# Patient Record
Sex: FEMALE | Race: BLACK OR AFRICAN AMERICAN | Employment: UNEMPLOYED | URBAN - METROPOLITAN AREA
[De-identification: names, ages, dates, MRNs, and addresses within clinical notes are randomized per-mention and may not be internally consistent; named-entity substitution may affect disease eponyms.]

---

## 2017-11-24 ENCOUNTER — OFFICE VISIT (OUTPATIENT)
Dept: FAMILY MEDICINE CLINIC | Age: 52
End: 2017-11-24

## 2017-11-24 VITALS
RESPIRATION RATE: 18 BRPM | SYSTOLIC BLOOD PRESSURE: 135 MMHG | BODY MASS INDEX: 37.56 KG/M2 | OXYGEN SATURATION: 98 % | TEMPERATURE: 97.2 F | HEIGHT: 63 IN | WEIGHT: 212 LBS | HEART RATE: 74 BPM | DIASTOLIC BLOOD PRESSURE: 82 MMHG

## 2017-11-24 DIAGNOSIS — M25.562 LEFT KNEE PAIN, UNSPECIFIED CHRONICITY: Primary | ICD-10-CM

## 2017-11-24 DIAGNOSIS — G47.00 INSOMNIA, UNSPECIFIED TYPE: ICD-10-CM

## 2017-11-24 DIAGNOSIS — F39 MOOD DISORDER (HCC): ICD-10-CM

## 2017-11-24 DIAGNOSIS — Z12.11 SCREEN FOR COLON CANCER: ICD-10-CM

## 2017-11-24 NOTE — PROGRESS NOTES
Chief Complaint   Patient presents with    Knee Swelling     x 1 week left knee previous surgery 5 years ago    Johnna Carey Establish Care     1. Have you been to the ER, urgent care clinic since your last visit? Hospitalized since your last visit? No    2. Have you seen or consulted any other health care providers outside of the [address] [place] [address] since your last visit? Include any pap smears or colon screening. No     HPI  Renetta Tate comes in to establish care. Knee swelling: Patient has swelling of her left knee. This has been ongoing for some days now. There is not much pain however. She denies trauma to the knee. She has discomfort going up and down inclines. Patient does have a history of meniscal tear in the knee and did have arthroscopy done in the past.  She does have ibuprofen but has not been using much of this. She wonders about arthritis. Does note some stiffness in the knee. She comes in for evaluation. Insomnia: Patient has a history of insomnia. She has tried various medications without much relief. She does have trouble getting to sleep and when she sleeps then this is not for a long time. She wonders about behavioral health issues affecting her sleep. Mood disorder: Patient states that she has PTSD. Her son was killed 7 years ago. When she goes to sleep she keeps thinking about it. This is causing some of her insomnia. She was on medication for PTSD and the mood disorder but this did not help much. She also did go for counseling and CBT but also felt this was not helping much. At the moment she does not want to take any medication. She also declines referral for behavioral health at the moment.     Past Medical History  Past Medical History:   Diagnosis Date    Depression        Surgical History  Past Surgical History:   Procedure Laterality Date    KNEE SCOPE, ALLOGRAFT IMPANT          Medications      Allergies  No Known Allergies    Family History  Family History Problem Relation Age of Onset    Cancer Mother     Cancer Father     No Known Problems Brother        Social History  Social History     Social History    Marital status:      Spouse name: N/A    Number of children: N/A    Years of education: N/A     Occupational History    Not on file.      Social History Main Topics    Smoking status: Never Smoker    Smokeless tobacco: Not on file    Alcohol use No    Drug use: No    Sexual activity: Not on file     Other Topics Concern    Not on file     Social History Narrative    No narrative on file       Review of Systems  Review of Systems - History obtained from the patient  General ROS: positive for  - sleep disturbance  Psychological ROS: positive for - behavioral disorder, depression and mood swings  Ophthalmic ROS: negative  ENT ROS: negative  Allergy and Immunology ROS: negative  Hematological and Lymphatic ROS: negative  Endocrine ROS: negative  Respiratory ROS: no cough, shortness of breath, or wheezing  Cardiovascular ROS: no chest pain or dyspnea on exertion  Gastrointestinal ROS: no abdominal pain, change in bowel habits, or black or bloody stools  Genito-Urinary ROS: negative  Musculoskeletal ROS: positive for - joint pain, joint stiffness, joint swelling and swelling in knee - left  Neurological ROS: negative    Vital Signs  Visit Vitals    /82 (BP 1 Location: Left arm, BP Patient Position: Sitting)    Pulse 74    Temp 97.2 °F (36.2 °C) (Oral)    Resp 18    Ht 5' 3\" (1.6 m)    Wt 212 lb (96.2 kg)    LMP 03/18/2017    SpO2 98%    BMI 37.55 kg/m2         Physical Exam  Physical Examination: General appearance - alert, well appearing, and in no distress, oriented to person, place, and time and acyanotic, in no respiratory distress  Mental status - alert, oriented to person, place, and time, affect appropriate to mood  Eyes - sclera anicteric  Nose - normal and patent, no erythema, discharge or polyps  Mouth - mucous membranes moist, pharynx normal without lesions  Neck - supple, no significant adenopathy  Lymphatics - no palpable lymphadenopathy  Chest - clear to auscultation, no wheezes, rales or rhonchi, symmetric air entry  Heart - normal rate and regular rhythm  Neurological - alert, oriented, normal speech, no focal findings or movement disorder noted  Musculoskeletal - Left knee with mild discomfort to palpation around the pre-patella margins and medially. There is slight swelling as compared to the left especially anteriorly. Slight limitation to flexion and extension of the knee due to discomfort. Extremities - no pedal edema noted, intact peripheral pulses    Results  Results for orders placed or performed in visit on 09/21/10   CHLAMYDIA/GC AMPLIFIED   Result Value Ref Range    Sample type THINPREP     Source CERVIX     Chlamydia amplified NEGATIVE  NEGATIVE    N. gonorrhea, amplified NEGATIVE  NEGATIVE    Comment (NOTE)    HEPATITIS PANEL, ACUTE   Result Value Ref Range    Hepatitis B core, IgM NEGATIVE  NEGATIVE    __       ---------------------------------------------------    Hepatitis B surface Ag .14 <1.00 Index    Hep B surface Ag Interp. NEGATIVE  NEGATIVE    __       ---------------------------------------------------    Hepatitis A, IgM NEGATIVE  NEGATIVE    __       ---------------------------------------------------    Hepatitis C virus Ab <0.02 <0.80 Index    Hep C  virus Ab Interp. NEGATIVE  NEGATIVE    Hep C  virus Ab comment        HIV-1 AB   Result Value Ref Range    HIV 1 Ab Negative Negative    HIV-1 Ab, W Blot Not Indicated Negative   RPR   Result Value Ref Range    RPR NONREACTIVE NONREACTIVE       ASSESSMENT and PLAN  1. Left knee pain, unspecified chronicity  - XR KNEE LT MIN 4 V; Future  - [address] [facility]    2. Insomnia, unspecified type    3. Screen for colon cancer  - OCCULT BLOOD, IMMUNOASSAY (FIT); Future    4.  Mood disorder ([address]    reviewed diet, exercise and weight control  reviewed medications and side effects in detail  radiology results and schedule of future radiology studies reviewed with patient    I have discussed the diagnosis with the patient and the intended plan of care as seen in the above orders. The patient has received an after-visit summary and questions were answered concerning future plans. I have discussed medication, side effects, and warnings with the patient in detail. The patient verbalized understanding and is in agreement with the plan of care. The patient will contact the office with any additional concerns.     Angela Concepcion MD

## 2017-11-24 NOTE — MR AVS SNAPSHOT
Visit Information Date & Time Provider Department Dept. Phone Encounter #  
 11/24/2017  1:00 PM Gaviota Montanez MD [facility] [phone] [id number] Follow-up Instructions Return in about 3 months (around 2/24/2018), or if symptoms worsen or fail to improve, for knee swelling, insomnia. Upcoming Health Maintenance Date Due DTaP/Tdap/Td series (1 - Tdap) 7/4/1988 PAP AKA CERVICAL CYTOLOGY 7/4/1988 BREAST CANCER SCRN MAMMOGRAM 7/4/2017 FOBT Q 1 YEAR AGE 50-75 7/4/2017 Allergies as of 11/24/2017  Review Complete On: 11/24/2017 By: Joceline Allen MD  
 No Known Allergies Current Immunizations  Never Reviewed No immunizations on file. Not reviewed this visit You Were Diagnosed With   
  
 Codes Comments Left knee pain, unspecified chronicity    -  Primary ICD-10-CM: S39.817 ICD-9-CM: 719.46 Insomnia, unspecified type     ICD-10-CM: G47.00 ICD-9-CM: 780.52 Screen for colon cancer     ICD-10-CM: Z12.11 ICD-9-CM: V76.51 Vitals BP Pulse Temp Resp Height(growth percentile) Weight(growth percentile) 135/82 (BP 1 Location: Left arm, BP Patient Position: Sitting) 74 97.2 °F (36.2 °C) (Oral) 18 5' 3\" (1.6 m) 212 lb (96.2 kg) LMP SpO2 BMI Smoking Status 03/18/2017 98% 37.55 kg/m2 Never Smoker BMI and BSA Data Body Mass Index Body Surface Area  
 37.55 kg/m 2 2.07 m 2 Your Updated Medication List  
  
Notice  As of 11/24/2017  1:45 PM  
 You have not been prescribed any medications. We Performed the Following REFERRAL TO ORTHOPEDICS [[facility] Custom] Follow-up Instructions Return in about 3 months (around 2/24/2018), or if symptoms worsen or fail to improve, for knee swelling, insomnia. To-Do List   
 11/24/2017 Imaging:  XR KNEE LT MIN 4 V   
  
 12/24/2017 Lab:  OCCULT BLOOD, IMMUNOASSAY (FIT) Referral Information Referral ID Referred By Referred To  
  
 [id number] [facility] Jose Bo MD   
   [address] [facility] [address], [address] Phone: [phone] Fax: [phone] Visits Status Start Date End Date 1 New Request 11/24/17 11/24/18 If your referral has a status of pending review or denied, additional information will be sent to support the outcome of this decision. Introducing [facility]! Lelo Tavon introduces [employer] patient portal. Now you can access parts of your medical record, email your doctor's office, and request medication refills online. 1. In your internet browser, go to [email or web address] 2. Click on the First Time User? Click Here link in the Sign In box. You will see the New Member Sign Up page. 3. Enter your [employer] Access Code exactly as it appears below. You will not need to use this code after youve completed the sign-up process. If you do not sign up before the expiration date, you must request a new code. · [employer] Access Code: [email or web address] Expires: 2/22/2018 12:44 PM 
 
4. Enter the last four digits of your Social Security Number (xxxx) and Date of Birth (mm/dd/yyyy) as indicated and click Submit. You will be taken to the next sign-up page. 5. Create a [employer] ID. This will be your [employer] login ID and cannot be changed, so think of one that is secure and easy to remember. 6. Create a [employer] password. You can change your password at any time. 7. Enter your Password Reset Question and Answer. This can be used at a later time if you forget your password. 8. Enter your e-mail address. You will receive e-mail notification when new information is available in [address] 9. Click Sign Up. You can now view and download portions of your medical record. 10. Click the Download Summary menu link to download a portable copy of your medical information. If you have questions, please visit the Frequently Asked Questions section of the [employer] website. Remember, [employer] is NOT to be used for urgent needs. For medical emergencies, dial 911. Now available from your iPhone and Android! Please provide this summary of care documentation to your next provider. Your primary care clinician is listed as Gaviota Mendoza. If you have any questions after today's visit, please call [phone].

## 2018-01-09 ENCOUNTER — OFFICE VISIT (OUTPATIENT)
Dept: ORTHOPEDIC SURGERY | Age: 53
End: 2018-01-09

## 2018-01-09 VITALS
HEIGHT: 63 IN | BODY MASS INDEX: 37.21 KG/M2 | DIASTOLIC BLOOD PRESSURE: 81 MMHG | WEIGHT: 210 LBS | HEART RATE: 74 BPM | OXYGEN SATURATION: 98 % | SYSTOLIC BLOOD PRESSURE: 145 MMHG | RESPIRATION RATE: 14 BRPM

## 2018-01-09 DIAGNOSIS — M17.12 PRIMARY OSTEOARTHRITIS OF LEFT KNEE: Primary | ICD-10-CM

## 2018-01-09 DIAGNOSIS — M25.562 LEFT KNEE PAIN, UNSPECIFIED CHRONICITY: ICD-10-CM

## 2018-01-09 RX ORDER — MELOXICAM 15 MG/1
15 TABLET ORAL
Qty: 30 TAB | Refills: 1 | Status: SHIPPED | OUTPATIENT
Start: 2018-01-09 | End: 2018-07-02

## 2018-01-09 RX ORDER — TRIAMCINOLONE ACETONIDE 40 MG/ML
40 INJECTION, SUSPENSION INTRA-ARTICULAR; INTRAMUSCULAR ONCE
Qty: 1 ML | Refills: 0
Start: 2018-01-09 | End: 2018-01-09

## 2018-01-09 RX ORDER — MELOXICAM 15 MG/1
15 TABLET ORAL DAILY
COMMUNITY
End: 2018-01-18 | Stop reason: SDUPTHER

## 2018-01-09 NOTE — PROGRESS NOTES
Víctor Tanner  1965   Chief Complaint   Patient presents with    Knee Pain     LT        HISTORY OF PRESENT ILLNESS  Víctor Tanner is a 50 y.o. female who presents today for evaluation of left knee pain. she rates her pain 0/10 today. Pain has been present for quite awhile, gradually worsening. Patient describes the pain as aching and throbbing that is Intermittent in nature. Symptoms are worse with walking up and down stairs, prolonged walking and standing, Activity and is better with  Rest. Associated symptoms include stiffness, Swelling. Since problem started, it: has worsened. Pain does not wake patient up at night. Has taken no recent medications for the problem. H/o of right knee surgery 7 years ago. Has tried following treatments: Injections:NO; Brace:NO; Therapy:NO; Cane/Crutch:NO       No Known Allergies     Past Medical History:   Diagnosis Date    Depression       Social History     Social History    Marital status:      Spouse name: N/A    Number of children: N/A    Years of education: N/A     Occupational History    Not on file. Social History Main Topics    Smoking status: Never Smoker    Smokeless tobacco: Not on file    Alcohol use No    Drug use: No    Sexual activity: Not on file     Other Topics Concern    Not on file     Social History Narrative      Past Surgical History:   Procedure Laterality Date    HX KNEE ARTHROSCOPY      KNEE SCOPE, ALLOGRAFT IMPANT        Family History   Problem Relation Age of Onset    Cancer Mother     Cancer Father     No Known Problems Brother       Current Outpatient Prescriptions   Medication Sig    meloxicam (MOBIC) 15 mg tablet Take 15 mg by mouth daily. No current facility-administered medications for this visit. REVIEW OF SYSTEM   Patient denies: Weight loss, Fever/Chills, HA, Visual changes, Fatigue, Chest pain, SOB, Abdominal pain, N/V/D/C, Blood in stool or urine, Edema.    Pertinent positive as above in HPI. All others were negative    PHYSICAL EXAM:   Visit Vitals    /81 (BP 1 Location: Left arm, BP Patient Position: Sitting)    Pulse 74    Resp 14    Ht 5' 3\" (1.6 m)    Wt 210 lb (95.3 kg)    SpO2 98%    BMI 37.2 kg/m2     The patient is a well-developed, well-nourished female   in no acute distress. The patient is alert and oriented times three. The patient is alert and oriented times three. Mood and affect are normal.  LYMPHATIC: lymph nodes are not enlarged and are within normal limits  SKIN: normal in color and non tender to palpation. There are no bruises or abrasions noted. NEUROLOGICAL: Motor sensory exam is within normal limits. Reflexes are equal bilaterally. There is normal sensation to pinprick and light touch  MUSCULOSKELETAL:  Examination Left knee   Skin Intact   Range of motion 0-130   Effusion +   Medial joint line tenderness +   Lateral joint line tenderness -   Tenderness Pes Bursa -   Tenderness insertion MCL -   Tenderness insertion LCL -   Nishis -   Patella crepitus -   Patella grind -   Lachman -   Pivot shift -   Anterior drawer -   Posterior drawer -   Varus stress -   Valgus stress -   Neurovascular Intact   Calf Swelling and Tenderness to Palpation -   Elizabeth's Test -   Hamstring Cord Tightness -       PROCEDURE: After sterile prep, 4 cc of Xylocaine and 1 cc of Kenalog were injected into the left knee.        [facility]  OFFICE PROCEDURE PROGRESS NOTE        Chart reviewed for the following:  Ursula Rubio MD, have reviewed the History, Physical and updated the Allergic reactions for [address] [place] [address] Drive performed immediately prior to start of procedure:  Ursula Rubio MD, have performed the following reviews on [facility] prior to the start of the procedure:            * Patient was identified by name and date of birth   * Agreement on procedure being performed was verified  * Risks and Benefits explained to the patient  * Procedure site verified and marked as necessary  * Patient was positioned for comfort  * Consent was signed and verified     Time: 11:56 AM    Date of procedure: 1/9/2018    Procedure performed by:  Monique Paulino MD    Provider assisted by: (see medication administration)    How tolerated by patient: tolerated the procedure well with no complications    Comments: none      IMAGING: XR of the left knee dated 1/9/18 was reviewed and read: decreased medial joint line space      IMPRESSION:      ICD-10-CM ICD-9-CM    1. Primary osteoarthritis of left knee M17.12 715.16 TRIAMCINOLONE ACETONIDE INJ      triamcinolone acetonide (KENALOG) 40 mg/mL injection      DRAIN/INJECT LARGE JOINT/BURSA      meloxicam (MOBIC) 15 mg tablet   2. Left knee pain, unspecified chronicity M25.562 719.46 AMB POC XRAY, KNEE; 1/2 VIEWS        PLAN:  1. Patient has had gradually worsening left knee pain coming from XR documented degenerative changes. Risk factors include: n/a  2. Yes cortisone injection indicated today L KNEE  3. No Physical/Occupational Therapy indicated today  4. No diagnostic test indicated today  5. Yes durable medical equipment indicated today  6. No referral indicated today   7. Yes medications indicated today MOBIC  8. No Narcotic indicated today     RTC 4 weeks if pain continues  Follow-up Disposition: Not on File    Scribed by Devendra [facility]) as dictated by Monique Paulino MD    I, Dr. Monique Paulino, confirm that all documentation is accurate.     Monique Paulino M.D.   [facility]

## 2018-01-12 ENCOUNTER — OFFICE VISIT (OUTPATIENT)
Dept: ORTHOPEDIC SURGERY | Age: 53
End: 2018-01-12

## 2018-01-12 VITALS
SYSTOLIC BLOOD PRESSURE: 155 MMHG | DIASTOLIC BLOOD PRESSURE: 149 MMHG | OXYGEN SATURATION: 100 % | HEART RATE: 45 BPM | BODY MASS INDEX: 37.39 KG/M2 | HEIGHT: 63 IN | WEIGHT: 211 LBS

## 2018-01-12 DIAGNOSIS — M70.61 TROCHANTERIC BURSITIS OF RIGHT HIP: Primary | ICD-10-CM

## 2018-01-12 RX ORDER — TRIAMCINOLONE ACETONIDE 40 MG/ML
40 INJECTION, SUSPENSION INTRA-ARTICULAR; INTRAMUSCULAR ONCE
Qty: 1 ML | Refills: 0
Start: 2018-01-12 | End: 2018-01-12

## 2018-01-12 NOTE — MR AVS SNAPSHOT
Visit Information Date & Time Provider Department Dept. Phone Encounter #  
 1/12/2018 10:00 AM Joseph Vila  [facility] and [facility] [phone] [id number] Your Appointments 2/6/2018  1:00 PM  
Follow Up with Yovani Suazo MD  
[address] [facility] and [facility] - [facility] ([facility] Appt Note: lt knee 4wk fu  
 [address] [facility], [address] [facility]  
[phone] [address] [facility], [facility] [address]  
  
    
 2/26/2018  2:00 PM  
[facility] Appt Note: 2 month return [address] [address] [address]  
  
   
 [address] [facility] Upcoming Health Maintenance Date Due DTaP/Tdap/Td series (1 - Tdap) 7/4/1988 BREAST CANCER SCRN MAMMOGRAM 7/4/2017 FOBT Q 1 YEAR AGE 50-75 7/4/2017 PAP AKA CERVICAL CYTOLOGY 7/12/2020 Allergies as of 1/12/2018  Review Complete On: 1/12/2018 By: Jelena Huerta. Mary Grace Wright LPN No Known Allergies Current Immunizations  Never Reviewed No immunizations on file. Not reviewed this visit Vitals BP Pulse Height(growth percentile) Weight(growth percentile) SpO2 BMI  
 (!) 155/149 (!) 45 5' 3\" (1.6 m) 211 lb (95.7 kg) 100% 37.38 kg/m2 OB Status Smoking Status Menopause Never Smoker Vitals History BMI and BSA Data Body Mass Index Body Surface Area  
 37.38 kg/m 2 2.06 m 2 Preferred Pharmacy Pharmacy Name Phone [address] [facility], [address] [facility] [phone] Your Updated Medication List  
  
   
This list is accurate as of: 1/12/18 11:12 AM.  Always use your most recent med list.  
  
  
  
  
 * MOBIC 15 mg tablet Generic drug:  meloxicam  
Take 15 mg by mouth daily. * meloxicam 15 mg tablet Commonly known as:  MOBIC Take 1 Tab by mouth daily (with breakfast). * Notice: This list has 2 medication(s) that are the same as other medications prescribed for you. Read the directions carefully, and ask your doctor or other care provider to review them with you. Introducing [facility]! [employer] introduces [employer] patient portal. Now you can access parts of your medical record, email your doctor's office, and request medication refills online. 1. In your internet browser, go to https://"[employer]. "[employer]"/"[employer] 2. Click on the First Time User? Click Here link in the Sign In box. You will see the New Member Sign Up page. 3. Enter your [employer] Access Code exactly as it appears below. You will not need to use this code after youve completed the sign-up process. If you do not sign up before the expiration date, you must request a new code. · [employer] Access Code: [email or web address] Expires: 2/22/2018 12:44 PM 
 
4. Enter the last four digits of your Social Security Number (xxxx) and Date of Birth (mm/dd/yyyy) as indicated and click Submit. You will be taken to the next sign-up page. 5. Create a [employer] ID. This will be your [employer] login ID and cannot be changed, so think of one that is secure and easy to remember. 6. Create a [employer] password. You can change your password at any time. 7. Enter your Password Reset Question and Answer. This can be used at a later time if you forget your password. 8. Enter your e-mail address. You will receive e-mail notification when new information is available in [address] 9. Click Sign Up. You can now view and download portions of your medical record. 10. Click the Download Summary menu link to download a portable copy of your medical information. If you have questions, please visit the Frequently Asked Questions section of the [employer] website.  Remember, [employer] is NOT to be used for urgent needs. For medical emergencies, dial 911. Now available from your iPhone and Android! Please provide this summary of care documentation to your next provider. Your primary care clinician is listed as Gaviota Mendoza. If you have any questions after today's visit, please call [phone].

## 2018-01-12 NOTE — PROGRESS NOTES
HISTORY OF PRESENT ILLNESS: Ms. Surya Leon is a 71-year-old female who is here for consultation regarding Raynold Lincoln in both hips. She states her right side is worse than her left. She points to pain in the lateral aspect of both hips. She points to pain in the region of the right greater trochanter. There is no history of trauma. She continues to work and her pain is aggravated by weightbearing activities. She does however have pain at night when she tries to lie on her right side. She denies radiating leg pain. She denies pain in the right groin. She denies a leg length discrepancy. She was put on Mobic medication just a few days ago and she just restarted taking this medication. She is not sure if it is helping or not. She was just seen in the office recently for a knee problem and had a Cortisone injection in her left knee. She has no history of fevers, chills, or rashes. PHYSICAL EXAMINATION:  Reveals an overweight, 71-year-old female in mild discomfort. She does ambulate with a slight antalgic gait with decreased stance gait on the right leg. She does not utilize any ambulatory assist.  She is alert and oriented x 3 and answers questions appropriately. With reference to her right hip, she is able to straight leg raise to about 80º today and this causes slight pain in the lateral aspect of the right hip. She has very good passive and active range of motion of the right hip without discomfort. Right hip roll test is negative. Gerards test reveals slight pain along the lateral aspect of the right hip, but not posteriorly. She has significant palpable tenderness over the right greater trochanter. Her pain is aggravated by resisted abduction of the right hip. With reference to her left hip, she has a normal left hip examination with normal active and passive rotation. Left hip roll test is negative. Leg lengths are symmetrical in a supine position.       PROCEDURE:  Under sterile technique today following her permission and a time-out, her right hip greater trochanter was injected with Lidocaine and Kenalog. She tolerated the procedure well. IMPRESSION: Right greater trochanteric bursitis. RECOMMENDATIONS:  She may continue on the Mobic medication. I have also recommended a Cortisone injection. She received that today. I have asked her to place some ice packs on the right hip later today. She will let me know if this does not help her in the near future. I also discussed in detail today the importance of weight reduction. All of her questions were answered. She will return to see me otherwise on a prn basis. Vitals:    01/12/18 1015   BP: (!) 155/149   Pulse: (!) 45   SpO2: 100%   Weight: 211 lb (95.7 kg)   Height: 5' 3\" (1.6 m)   PainSc:  10 - Worst pain ever       There is no problem list on file for this patient. There are no active problems to display for this patient. Current Outpatient Prescriptions   Medication Sig Dispense Refill    triamcinolone acetonide (KENALOG) 40 mg/mL injection 1 mL by IntraMUSCular route once for 1 dose. 1 mL 0    meloxicam (MOBIC) 15 mg tablet Take 15 mg by mouth daily.  meloxicam (MOBIC) 15 mg tablet Take 1 Tab by mouth daily (with breakfast).  30 Tab 1     No Known Allergies  Past Medical History:   Diagnosis Date    Depression      Past Surgical History:   Procedure Laterality Date    HX KNEE ARTHROSCOPY      KNEE SCOPE, ALLOGRAFT IMPANT       Family History   Problem Relation Age of Onset    Cancer Mother     Cancer Father     No Known Problems Brother      Social History   Substance Use Topics    Smoking status: Never Smoker    Smokeless tobacco: Never Used    Alcohol use No

## 2018-01-18 ENCOUNTER — OFFICE VISIT (OUTPATIENT)
Dept: FAMILY MEDICINE CLINIC | Age: 53
End: 2018-01-18

## 2018-01-18 VITALS
TEMPERATURE: 97.9 F | BODY MASS INDEX: 37.03 KG/M2 | SYSTOLIC BLOOD PRESSURE: 127 MMHG | DIASTOLIC BLOOD PRESSURE: 73 MMHG | HEIGHT: 63 IN | WEIGHT: 209 LBS | HEART RATE: 62 BPM | OXYGEN SATURATION: 99 % | RESPIRATION RATE: 18 BRPM

## 2018-01-18 DIAGNOSIS — R03.0 ELEVATED BP WITHOUT DIAGNOSIS OF HYPERTENSION: Primary | ICD-10-CM

## 2018-01-18 DIAGNOSIS — E66.9 OBESITY (BMI 30-39.9): ICD-10-CM

## 2018-01-18 PROBLEM — F33.9 RECURRENT DEPRESSION (HCC): Status: ACTIVE | Noted: 2018-01-18

## 2018-01-18 NOTE — PROGRESS NOTES
Chief Complaint   Patient presents with    Elevated Blood Pressure     Patient states that her blood pressure has been elevated whe she checks it at home. She also states that she gets headaches when her blood pressure is elevated. 1. Have you been to the ER, urgent care clinic since your last visit? Hospitalized since your last visit? No    2. Have you seen or consulted any other health care providers outside of the [address] [place] [address] since your last visit? Include any pap smears or colon screening. No     HPI  Brandi Villafana comes in for follow-up care. Hypertension: Patient's blood pressure today is normal.  She has been having fluctuations in her blood pressures and at times it has been high. Yesterday it was very high with a systolic in the 314L in the diastolic above 180. She also was not feeling well at the time. Today she feels good and the blood pressure currently is within normal.  We discussed medication use and hypertension. For now she will keep a blood pressure log at least twice a day for the next 3 weeks and will come in for all of care. I did print out the [place] [address] for her and she will do low-sodium diet. She will exercise and do lifestyle modifications. Obesity: Patient's BMI is 37.02. She would like to lose weight. We discussed her BMI and the fact that it was high. She does agree that she needs to lose weight but she would prefer to do lifestyle and dietary modifications in an effort to cut back on her weight. I will follow-up at the next visit to see how she is doing. We discussed medications for weight loss but she would prefer to hold off on these for now.       Past Medical History  Past Medical History:   Diagnosis Date    Depression        Surgical History  Past Surgical History:   Procedure Laterality Date    HX KNEE ARTHROSCOPY      KNEE SCOPE, ALLOGRAFT IMPANT          Medications  Current Outpatient Prescriptions   Medication Sig Dispense Refill    meloxicam (MOBIC) 15 mg tablet Take 1 Tab by mouth daily (with breakfast). 30 Tab 1       Allergies  No Known Allergies    Family History  Family History   Problem Relation Age of Onset    Cancer Mother     Cancer Father     No Known Problems Brother        Social History  Social History     Social History    Marital status:      Spouse name: N/A    Number of children: N/A    Years of education: N/A     Occupational History    Not on file.      Social History Main Topics    Smoking status: Never Smoker    Smokeless tobacco: Never Used    Alcohol use No    Drug use: No    Sexual activity: Not on file     Other Topics Concern    Not on file     Social History Narrative       Review of Systems  Review of Systems - History obtained from chart review and the patient  General ROS: negative  Psychological ROS: negative  Ophthalmic ROS: positive for - uses glasses  Endocrine ROS: negative  Respiratory ROS: no cough, shortness of breath, or wheezing  Cardiovascular ROS: no chest pain or dyspnea on exertion  Gastrointestinal ROS: no abdominal pain, change in bowel habits, or black or bloody stools  Genito-Urinary ROS: negative  Musculoskeletal ROS: negative    Vital Signs  Visit Vitals    /73 (BP 1 Location: Left arm, BP Patient Position: Sitting)    Pulse 62    Temp 97.9 °F (36.6 °C) (Oral)    Resp 18    Ht 5' 3\" (1.6 m)    Wt 209 lb (94.8 kg)    SpO2 99%    BMI 37.02 kg/m2         Physical Exam  Physical Examination: General appearance - alert, well appearing, and in no distress, oriented to person, place, and time and acyanotic, in no respiratory distress  Mental status - alert, oriented to person, place, and time, normal mood, behavior, speech, dress, motor activity, and thought processes  Chest - clear to auscultation, no wheezes, rales or rhonchi, symmetric air entry  Heart - normal rate, regular rhythm, normal S1, S2, no murmurs, rubs, clicks or gallops  Neurological - alert, oriented, normal speech, no focal findings or movement disorder noted, motor and sensory grossly normal bilaterally  Musculoskeletal - no joint tenderness, deformity or swelling  Extremities - no pedal edema noted, intact peripheral pulses    Results  Results for orders placed or performed in visit on 09/21/10   CHLAMYDIA/GC AMPLIFIED   Result Value Ref Range    Sample type THINPREP     Source CERVIX     Chlamydia amplified NEGATIVE  NEGATIVE    N. gonorrhea, amplified NEGATIVE  NEGATIVE    Comment (NOTE)    HEPATITIS PANEL, ACUTE   Result Value Ref Range    Hepatitis B core, IgM NEGATIVE  NEGATIVE    __       ---------------------------------------------------    Hepatitis B surface Ag .14 <1.00 Index    Hep B surface Ag Interp. NEGATIVE  NEGATIVE    __       ---------------------------------------------------    Hepatitis A, IgM NEGATIVE  NEGATIVE    __       ---------------------------------------------------    Hepatitis C virus Ab <0.02 <0.80 Index    Hep C  virus Ab Interp. NEGATIVE  NEGATIVE    Hep C  virus Ab comment        HIV-1 AB   Result Value Ref Range    HIV 1 Ab Negative Negative    HIV-1 Ab, W Blot Not Indicated Negative   RPR   Result Value Ref Range    RPR NONREACTIVE NONREACTIVE       ASSESSMENT and PLAN    ICD-10-CM ICD-9-CM    1. Elevated BP without diagnosis of hypertension R03.0 796.2    2. Obesity (BMI 30-39. 9) E66.9 278.00      Discussed the patient's BMI with her. The BMI follow up plan is as follows:     dietary management education, guidance, and counseling  encourage exercise  monitor weight  prescribed dietary intake    reviewed diet, exercise and weight control  reviewed medications and side effects in detail    I have discussed the diagnosis with the patient and the intended plan of care as seen in the above orders. The patient has received an after-visit summary and questions were answered concerning future plans.  I have discussed medication, side effects, and warnings with the patient in detail. The patient verbalized understanding and is in agreement with the plan of care. The patient will contact the office with any additional concerns.     Rebecca Villanueva MD

## 2018-01-18 NOTE — PATIENT INSTRUCTIONS
DASH Diet: Care Instructions  Your Care Instructions    The DASH diet is an eating plan that can help lower your blood pressure. DASH stands for Dietary Approaches to Stop Hypertension. Hypertension is high blood pressure. The DASH diet focuses on eating foods that are high in calcium, potassium, and magnesium. These nutrients can lower blood pressure. The foods that are highest in these nutrients are fruits, vegetables, low-fat dairy products, nuts, seeds, and legumes. But taking calcium, potassium, and magnesium supplements instead of eating foods that are high in those nutrients does not have the same effect. The DASH diet also includes whole grains, fish, and poultry. The DASH diet is one of several lifestyle changes your doctor may recommend to lower your high blood pressure. Your doctor may also want you to decrease the amount of sodium in your diet. Lowering sodium while following the DASH diet can lower blood pressure even further than just the DASH diet alone. Follow-up care is a key part of your treatment and safety. Be sure to make and go to all appointments, and call your doctor if you are having problems. It's also a good idea to know your test results and keep a list of the medicines you take. How can you care for yourself at home? Following the DASH diet  · Eat 4 to 5 servings of fruit each day. A serving is 1 medium-sized piece of fruit, ½ cup chopped or canned fruit, 1/4 cup dried fruit, or 4 ounces (½ cup) of fruit juice. Choose fruit more often than fruit juice. · Eat 4 to 5 servings of vegetables each day. A serving is 1 cup of lettuce or raw leafy vegetables, ½ cup of chopped or cooked vegetables, or 4 ounces (½ cup) of vegetable juice. Choose vegetables more often than vegetable juice. · Get 2 to 3 servings of low-fat and fat-free dairy each day. A serving is 8 ounces of milk, 1 cup of yogurt, or 1 ½ ounces of cheese. · Eat 6 to 8 servings of grains each day.  A serving is 1 slice of bread, 1 ounce of dry cereal, or ½ cup of cooked rice, pasta, or cooked cereal. Try to choose whole-grain products as much as possible. · Limit lean meat, poultry, and fish to 2 servings each day. A serving is 3 ounces, about the size of a deck of cards. · Eat 4 to 5 servings of nuts, seeds, and legumes (cooked dried beans, lentils, and split peas) each week. A serving is 1/3 cup of nuts, 2 tablespoons of seeds, or ½ cup of cooked beans or peas. · Limit fats and oils to 2 to 3 servings each day. A serving is 1 teaspoon of vegetable oil or 2 tablespoons of salad dressing. · Limit sweets and added sugars to 5 servings or less a week. A serving is 1 tablespoon jelly or jam, ½ cup sorbet, or 1 cup of lemonade. · Eat less than 2,300 milligrams (mg) of sodium a day. If you limit your sodium to 1,500 mg a day, you can lower your blood pressure even more. Tips for success  · Start small. Do not try to make dramatic changes to your diet all at once. You might feel that you are missing out on your favorite foods and then be more likely to not follow the plan. Make small changes, and stick with them. Once those changes become habit, add a few more changes. · Try some of the following:  ¨ Make it a goal to eat a fruit or vegetable at every meal and at snacks. This will make it easy to get the recommended amount of fruits and vegetables each day. ¨ Try yogurt topped with fruit and nuts for a snack or healthy dessert. ¨ Add lettuce, tomato, cucumber, and onion to sandwiches. ¨ Combine a ready-made pizza crust with low-fat mozzarella cheese and lots of vegetable toppings. Try using tomatoes, squash, spinach, broccoli, carrots, cauliflower, and onions. ¨ Have a variety of cut-up vegetables with a low-fat dip as an appetizer instead of chips and dip. ¨ Sprinkle sunflower seeds or chopped almonds over salads. Or try adding chopped walnuts or almonds to cooked vegetables.   ¨ Try some vegetarian meals using beans and peas. Add garbanzo or kidney beans to salads. Make burritos and tacos with mashed lozada beans or black beans. Where can you learn more? Go to http://nolan-andra.info/. Enter K100 in the search box to learn more about \"DASH Diet: Care Instructions. \"  Current as of: September 21, 2016  Content Version: 11.4  © 7032-5256 [employer]. Care instructions adapted under license by "[employer] (which disclaims liability or warranty for this information). If you have questions about a medical condition or this instruction, always ask your healthcare professional. [address] any warranty or liability for your use of this information. Body Mass Index: Care Instructions  Your Care Instructions    Body mass index (BMI) can help you see if your weight is raising your risk for health problems. It uses a formula to compare how much you weigh with how tall you are. · A BMI lower than 18.5 is considered underweight. · A BMI between 18.5 and 24.9 is considered healthy. · A BMI between 25 and 29.9 is considered overweight. A BMI of 30 or higher is considered obese. If your BMI is in the normal range, it means that you have a lower risk for weight-related health problems. If your BMI is in the overweight or obese range, you may be at increased risk for weight-related health problems, such as high blood pressure, heart disease, stroke, arthritis or joint pain, and diabetes. If your BMI is in the underweight range, you may be at increased risk for health problems such as fatigue, lower protection (immunity) against illness, muscle loss, bone loss, hair loss, and hormone problems. BMI is just one measure of your risk for weight-related health problems. You may be at higher risk for health problems if you are not active, you eat an unhealthy diet, or you drink too much alcohol or use tobacco products.   Follow-up care is a key part of your treatment and safety. Be sure to make and go to all appointments, and call your doctor if you are having problems. It's also a good idea to know your test results and keep a list of the medicines you take. How can you care for yourself at home? · Practice healthy eating habits. This includes eating plenty of fruits, vegetables, whole grains, lean protein, and low-fat dairy. · If your doctor recommends it, get more exercise. Walking is a good choice. Bit by bit, increase the amount you walk every day. Try for at least 30 minutes on most days of the week. · Do not smoke. Smoking can increase your risk for health problems. If you need help quitting, talk to your doctor about stop-smoking programs and medicines. These can increase your chances of quitting for good. · Limit alcohol to 2 drinks a day for men and 1 drink a day for women. Too much alcohol can cause health problems. If you have a BMI higher than 25  · Your doctor may do other tests to check your risk for weight-related health problems. This may include measuring the distance around your waist. A waist measurement of more than 40 inches in men or 35 inches in women can increase the risk of weight-related health problems. · Talk with your doctor about steps you can take to stay healthy or improve your health. You may need to make lifestyle changes to lose weight and stay healthy, such as changing your diet and getting regular exercise. If you have a BMI lower than 18.5  · Your doctor may do other tests to check your risk for health problems. · Talk with your doctor about steps you can take to stay healthy or improve your health. You may need to make lifestyle changes to gain or maintain weight and stay healthy, such as getting more healthy foods in your diet and doing exercises to build muscle. Where can you learn more? Go to http://nolan-andra.info/.   Enter S176 in the search box to learn more about \"Body Mass Index: Care Instructions. \"  Current as of: October 13, 2016  Content Version: 11.4  © 3288-1819 [employer], [facility]. Care instructions adapted under license by "[employer] (which disclaims liability or warranty for this information). If you have questions about a medical condition or this instruction, always ask your healthcare professional. [place] [address] any warranty or liability for your use of this information.

## 2018-01-18 NOTE — MR AVS SNAPSHOT
[facility] [address] [facility] 
[phone] Patient: Maryanne Meeks MRN: [id number] :1965 Visit Information Date & Time Provider Department Dept. Phone Encounter #  
 2018  2:15 PM Nadege Bates [phone] [id number] Follow-up Instructions Return if symptoms worsen or fail to improve. Your Appointments 2018  1:00 PM  
Follow Up with Irlanda Harris MD  
[address] [facility], [address] and [facility] - [facility] ([facility]) Appt Note: lt knee 4wk fu  
 [address] [facility], [address] 706 [facility]  
[phone] [address] [facility], [address]  
  
    
 2018  2:00 PM  
Follow Up with MD Nadege Bates ([facility] Appt Note: 2 month return [address] [id number] [address]  
  
   
 [address] [facility] Upcoming Health Maintenance Date Due DTaP/Tdap/Td series (1 - Tdap) 1988 FOBT Q 1 YEAR AGE 50-75 2017 BREAST CANCER SCRN MAMMOGRAM 9/15/2019 PAP AKA CERVICAL CYTOLOGY 2020 Allergies as of 2018  Review Complete On: 2018 By: Justin Jacobs MD  
 No Known Allergies Current Immunizations  Never Reviewed No immunizations on file. Not reviewed this visit You Were Diagnosed With   
  
 Codes Comments Elevated BP without diagnosis of hypertension    -  Primary ICD-10-CM: R03.0 ICD-9-CM: 796.2 Obesity (BMI 30-39. 9)     ICD-10-CM: E66.9 ICD-9-CM: 278.00 Vitals BP Pulse Temp Resp Height(growth percentile) Weight(growth percentile) 127/73 (BP 1 Location: Left arm, BP Patient Position: Sitting) 62 97.9 °F (36.6 °C) (Oral) 18 5' 3\" (1.6 m) 209 lb (94.8 kg) SpO2 BMI OB Status Smoking Status 99% 37.02 kg/m2 Menopause Never Smoker BMI and BSA Data Body Mass Index Body Surface Area 37.02 kg/m 2 2.05 m 2 Preferred Pharmacy Pharmacy Name Phone [address] [facility], [address] [place] [address] [phone] Your Updated Medication List  
  
   
This list is accurate as of: 1/18/18  3:27 PM.  Always use your most recent med list.  
  
  
  
  
 meloxicam 15 mg tablet Commonly known as:  MOBIC Take 1 Tab by mouth daily (with breakfast). Follow-up Instructions Return if symptoms worsen or fail to improve. Patient Instructions DASH Diet: Care Instructions Your Care Instructions The DASH diet is an eating plan that can help lower your blood pressure. DASH stands for Dietary Approaches to Stop Hypertension. Hypertension is high blood pressure. The DASH diet focuses on eating foods that are high in calcium, potassium, and magnesium. These nutrients can lower blood pressure. The foods that are highest in these nutrients are fruits, vegetables, low-fat dairy products, nuts, seeds, and legumes. But taking calcium, potassium, and magnesium supplements instead of eating foods that are high in those nutrients does not have the same effect. The DASH diet also includes whole grains, fish, and poultry. The DASH diet is one of several lifestyle changes your doctor may recommend to lower your high blood pressure. Your doctor may also want you to decrease the amount of sodium in your diet. Lowering sodium while following the DASH diet can lower blood pressure even further than just the DASH diet alone. Follow-up care is a key part of your treatment and safety. Be sure to make and go to all appointments, and call your doctor if you are having problems. It's also a good idea to know your test results and keep a list of the medicines you take. How can you care for yourself at home? Following the DASH diet · Eat 4 to 5 servings of fruit each day. A serving is 1 medium-sized piece of fruit, ½ cup chopped or canned fruit, 1/4 cup dried fruit, or 4 ounces (½ cup) of fruit juice. Choose fruit more often than fruit juice. · Eat 4 to 5 servings of vegetables each day. A serving is 1 cup of lettuce or raw leafy vegetables, ½ cup of chopped or cooked vegetables, or 4 ounces (½ cup) of vegetable juice. Choose vegetables more often than vegetable juice. · Get 2 to 3 servings of low-fat and fat-free dairy each day. A serving is 8 ounces of milk, 1 cup of yogurt, or 1 ½ ounces of cheese. · Eat 6 to 8 servings of grains each day. A serving is 1 slice of bread, 1 ounce of dry cereal, or ½ cup of cooked rice, pasta, or cooked cereal. Try to choose whole-grain products as much as possible. · Limit lean meat, poultry, and fish to 2 servings each day. A serving is 3 ounces, about the size of a deck of cards. · Eat 4 to 5 servings of nuts, seeds, and legumes (cooked dried beans, lentils, and split peas) each week. A serving is 1/3 cup of nuts, 2 tablespoons of seeds, or ½ cup of cooked beans or peas. · Limit fats and oils to 2 to 3 servings each day. A serving is 1 teaspoon of vegetable oil or 2 tablespoons of salad dressing. · Limit sweets and added sugars to 5 servings or less a week. A serving is 1 tablespoon jelly or jam, ½ cup sorbet, or 1 cup of lemonade. · Eat less than 2,300 milligrams (mg) of sodium a day. If you limit your sodium to 1,500 mg a day, you can lower your blood pressure even more. Tips for success · Start small. Do not try to make dramatic changes to your diet all at once. You might feel that you are missing out on your favorite foods and then be more likely to not follow the plan. Make small changes, and stick with them. Once those changes become habit, add a few more changes. · Try some of the following: ¨ Make it a goal to eat a fruit or vegetable at every meal and at snacks. This will make it easy to get the recommended amount of fruits and vegetables each day. ¨ Try yogurt topped with fruit and nuts for a snack or healthy dessert. ¨ Add lettuce, tomato, cucumber, and onion to sandwiches. ¨ Combine a ready-made pizza crust with low-fat mozzarella cheese and lots of vegetable toppings. Try using tomatoes, squash, spinach, broccoli, carrots, cauliflower, and onions. ¨ Have a variety of cut-up vegetables with a low-fat dip as an appetizer instead of chips and dip. ¨ Sprinkle sunflower seeds or chopped almonds over salads. Or try adding chopped walnuts or almonds to cooked vegetables. ¨ Try some vegetarian meals using beans and peas. Add garbanzo or kidney beans to salads. Make burritos and tacos with mashed lozada beans or black beans. Where can you learn more? Go to http://nolan-andra.info/. Enter U749 in the search box to learn more about \"DASH Diet: Care Instructions. \" Current as of: September 21, 2016 Content Version: 11.4 © 9994-6637 [employer]. Care instructions adapted under license by [employer] (which disclaims liability or warranty for this information). If you have questions about a medical condition or this instruction, always ask your healthcare professional. [place] [address] any warranty or liability for your use of this information. Body Mass Index: Care Instructions Your Care Instructions Body mass index (BMI) can help you see if your weight is raising your risk for health problems. It uses a formula to compare how much you weigh with how tall you are. · A BMI lower than 18.5 is considered underweight. · A BMI between 18.5 and 24.9 is considered healthy. · A BMI between 25 and 29.9 is considered overweight. A BMI of 30 or higher is considered obese.  
If your BMI is in the normal range, it means that you have a lower risk for weight-related health problems. If your BMI is in the overweight or obese range, you may be at increased risk for weight-related health problems, such as high blood pressure, heart disease, stroke, arthritis or joint pain, and diabetes. If your BMI is in the underweight range, you may be at increased risk for health problems such as fatigue, lower protection (immunity) against illness, muscle loss, bone loss, hair loss, and hormone problems. BMI is just one measure of your risk for weight-related health problems. You may be at higher risk for health problems if you are not active, you eat an unhealthy diet, or you drink too much alcohol or use tobacco products. Follow-up care is a key part of your treatment and safety. Be sure to make and go to all appointments, and call your doctor if you are having problems. It's also a good idea to know your test results and keep a list of the medicines you take. How can you care for yourself at home? · Practice healthy eating habits. This includes eating plenty of fruits, vegetables, whole grains, lean protein, and low-fat dairy. · If your doctor recommends it, get more exercise. Walking is a good choice. Bit by bit, increase the amount you walk every day. Try for at least 30 minutes on most days of the week. · Do not smoke. Smoking can increase your risk for health problems. If you need help quitting, talk to your doctor about stop-smoking programs and medicines. These can increase your chances of quitting for good. · Limit alcohol to 2 drinks a day for men and 1 drink a day for women. Too much alcohol can cause health problems. If you have a BMI higher than 25 · Your doctor may do other tests to check your risk for weight-related health problems. This may include measuring the distance around your waist. A waist measurement of more than 40 inches in men or 35 inches in women can increase the risk of weight-related health problems. · Talk with your doctor about steps you can take to stay healthy or improve your health. You may need to make lifestyle changes to lose weight and stay healthy, such as changing your diet and getting regular exercise. If you have a BMI lower than 18.5 · Your doctor may do other tests to check your risk for health problems. · Talk with your doctor about steps you can take to stay healthy or improve your health. You may need to make lifestyle changes to gain or maintain weight and stay healthy, such as getting more healthy foods in your diet and doing exercises to build muscle. Where can you learn more? Go to http://nolan-andra.info/. Enter S176 in the search box to learn more about \"Body Mass Index: Care Instructions. \" Current as of: October 13, 2016 Content Version: 11.4 © 2547-4441 [employer]. Care instructions adapted under license by [employer] (which disclaims liability or warranty for this information). If you have questions about a medical condition or this instruction, always ask your healthcare professional. [address] any warranty or liability for your use of this information. Introducing [facility]! [employer] introduces [employer] patient portal. Now you can access parts of your medical record, email your doctor's office, and request medication refills online. 1. In your internet browser, go to [email or web address]. [email or web address] 2. Click on the First Time User? Click Here link in the Sign In box. You will see the New Member Sign Up page. 3. Enter your [employer] Access Code exactly as it appears below. You will not need to use this code after youve completed the sign-up process. If you do not sign up before the expiration date, you must request a new code. · [employer] Access Code: [email or web address] Expires: 2/22/2018 12:44 PM 
 
4.  Enter the last four digits of your Social Security Number (xxxx) and Date of Birth (mm/dd/yyyy) as indicated and click Submit. You will be taken to the next sign-up page. 5. Create a [employer] ID. This will be your [employer] login ID and cannot be changed, so think of one that is secure and easy to remember. 6. Create a [employer] password. You can change your password at any time. 7. Enter your Password Reset Question and Answer. This can be used at a later time if you forget your password. 8. Enter your e-mail address. You will receive e-mail notification when new information is available in [address] 9. Click Sign Up. You can now view and download portions of your medical record. 10. Click the Download Summary menu link to download a portable copy of your medical information. If you have questions, please visit the Frequently Asked Questions section of the [employer] website. Remember, [employer] is NOT to be used for urgent needs. For medical emergencies, dial 911. Now available from your iPhone and Android! Please provide this summary of care documentation to your next provider. Your primary care clinician is listed as Gaviota Mendoza. If you have any questions after today's visit, please call [phone].

## 2018-02-14 ENCOUNTER — OFFICE VISIT (OUTPATIENT)
Dept: ORTHOPEDIC SURGERY | Age: 53
End: 2018-02-14

## 2018-02-14 VITALS
WEIGHT: 201.8 LBS | BODY MASS INDEX: 35.75 KG/M2 | RESPIRATION RATE: 15 BRPM | SYSTOLIC BLOOD PRESSURE: 134 MMHG | DIASTOLIC BLOOD PRESSURE: 86 MMHG | HEART RATE: 70 BPM | TEMPERATURE: 98.3 F | HEIGHT: 63 IN

## 2018-02-14 DIAGNOSIS — M17.12 PRIMARY OSTEOARTHRITIS OF LEFT KNEE: Primary | ICD-10-CM

## 2018-02-14 RX ORDER — MECLIZINE HYDROCHLORIDE 25 MG/1
25 TABLET ORAL
COMMUNITY
Start: 2018-02-12 | End: 2018-03-12

## 2018-02-14 RX ORDER — BUTALBITAL, ACETAMINOPHEN AND CAFFEINE 50; 325; 40 MG/1; MG/1; MG/1
TABLET ORAL
COMMUNITY
Start: 2018-02-12 | End: 2018-02-26 | Stop reason: SDUPTHER

## 2018-02-14 NOTE — PROGRESS NOTES
Audie Lim  1965   Chief Complaint   Patient presents with    Knee Pain     left        HISTORY OF PRESENT ILLNESS  Audie Lim is a 50 y.o. female who presents today for reevaluation of left. Patient rates pain as 0/10 today. At last OV, patient had a cortisone injection which provided some relief. She was also given a prescription for Mobic. Describes having problems at work when going up and down stairs as well as prolonged walking and standing. Associated symptoms are swelling and instability. She has tried wearing a brace. She works in the [employer] and has to do a lot of walking. Patient denies any fever, chills, chest pain, shortness of breath or calf pain. There are no new illness or injuries to report since last seen in the office. There are no changes to medications, allergies, family or social history. PHYSICAL EXAM:   Visit Vitals    /86    Pulse 70    Temp 98.3 °F (36.8 °C)    Resp 15    Ht 5' 3\" (1.6 m)    Wt 201 lb 12.8 oz (91.5 kg)    BMI 35.75 kg/m2     The patient is a well-developed, well-nourished female   in no acute distress. The patient is alert and oriented times three. The patient is alert and oriented times three. Mood and affect are normal.  LYMPHATIC: lymph nodes are not enlarged and are within normal limits  SKIN: normal in color and non tender to palpation. There are no bruises or abrasions noted. NEUROLOGICAL: Motor sensory exam is within normal limits. Reflexes are equal bilaterally.  There is normal sensation to pinprick and light touch  MUSCULOSKELETAL:  Examination Left knee   Skin Intact   Range of motion 0-130   Effusion +   Medial joint line tenderness +   Lateral joint line tenderness -   Tenderness Pes Bursa -   Tenderness insertion MCL -   Tenderness insertion LCL -   Nishis -   Patella crepitus -   Patella grind -   Lachman -   Pivot shift -   Anterior drawer -   Posterior drawer -   Varus stress -   Valgus stress -   Neurovascular Intact   Calf Swelling and Tenderness to Palpation -   Elizabeth's Test -   Hamstring Cord Tightness -       IMAGING: XR of the left knee dated 1/9/18 was reviewed and read: decreased medial joint line space    IMPRESSION:      ICD-10-CM ICD-9-CM    1. Primary osteoarthritis of left knee M17.12 715.16         PLAN:   1. I am pleased that she doesn't have any pain at this time. When her pain returns, we will get authorization for Euflexxa. Instructed the patient to continue using Mobic prn. Risk factors include: n/a  2. No cortisone injection indicated today   3. No Physical/Occupational Therapy indicated today  4. No diagnostic test indicated today  5. No durable medical equipment indicated today  6. No referral indicated today   7. No medications indicated today  8. No Narcotic indicated today      RTC prn  Follow-up Disposition: Not on File    Scribed by Gerald Pryor [facility]) as dictated by Kevin Aceves MD    I, Dr. Kevin Aceves, confirm that all documentation is accurate.     Kevin Aceves M.D.   [facility]

## 2018-02-26 ENCOUNTER — OFFICE VISIT (OUTPATIENT)
Dept: FAMILY MEDICINE CLINIC | Age: 53
End: 2018-02-26

## 2018-02-26 VITALS
SYSTOLIC BLOOD PRESSURE: 134 MMHG | WEIGHT: 203 LBS | RESPIRATION RATE: 20 BRPM | HEIGHT: 63 IN | HEART RATE: 71 BPM | OXYGEN SATURATION: 98 % | TEMPERATURE: 98.1 F | BODY MASS INDEX: 35.97 KG/M2 | DIASTOLIC BLOOD PRESSURE: 84 MMHG

## 2018-02-26 DIAGNOSIS — M25.569 ACUTE KNEE PAIN, UNSPECIFIED LATERALITY: ICD-10-CM

## 2018-02-26 DIAGNOSIS — B27.99 INFECTIOUS MONONUCLEOSIS, WITH OTHER COMPLICATION, INFECTIOUS MONONUCLEOSIS DUE TO UNSPECIFIED ORGANISM: ICD-10-CM

## 2018-02-26 DIAGNOSIS — G44.89 OTHER HEADACHE SYNDROME: Primary | ICD-10-CM

## 2018-02-26 RX ORDER — BUTALBITAL, ACETAMINOPHEN AND CAFFEINE 50; 325; 40 MG/1; MG/1; MG/1
TABLET ORAL
Qty: 20 TAB | Refills: 1 | Status: SHIPPED | OUTPATIENT
Start: 2018-02-26 | End: 2018-03-12 | Stop reason: ALTCHOICE

## 2018-02-26 NOTE — MR AVS SNAPSHOT
[facility] 107 200 [facility] 
[phone] Patient: Víctor Tanner MRN: [id number] :1965 Visit Information Date & Time Provider Department Dept. Phone Encounter #  
 2018  2:00 PM MD Nadege Kim [phone] Follow-up Instructions Return if symptoms worsen or fail to improve. Upcoming Health Maintenance Date Due DTaP/Tdap/Td series (1 - Tdap) 1988 FOBT Q 1 YEAR AGE 50-75 2017 BREAST CANCER SCRN MAMMOGRAM 9/15/2019 PAP AKA CERVICAL CYTOLOGY 2020 Allergies as of 2018  Review Complete On: 2018 By: Saroj Choe MD  
  
 Severity Noted Reaction Type Reactions Augmentin [Amoxicillin-pot Clavulanate]  2018    Nausea and Vomiting Current Immunizations  Never Reviewed No immunizations on file. Not reviewed this visit You Were Diagnosed With   
  
 Codes Comments Other headache syndrome    -  Primary ICD-10-CM: G44.89 ICD-9-CM: 339.89 Infectious mononucleosis, with other complication, infectious mononucleosis due to unspecified organism     ICD-10-CM: B27.99 
ICD-9-CM: 402 Vitals BP Pulse Temp Resp Height(growth percentile) Weight(growth percentile) 134/84 (BP 1 Location: Left arm, BP Patient Position: Sitting) 71 98.1 °F (36.7 °C) (Oral) 20 5' 3\" (1.6 m) 203 lb (92.1 kg) SpO2 BMI OB Status Smoking Status 98% 35.96 kg/m2 Menopause Never Smoker Vitals History BMI and BSA Data Body Mass Index Body Surface Area 35.96 kg/m 2 2.02 m 2 Preferred Pharmacy Pharmacy Name Phone [facility], [address] [place] [address] [phone] Your Updated Medication List  
  
   
This list is accurate as of 18  3:39 PM.  Always use your most recent med list.  
  
  
  
  
 butalbital-acetaminophen-caffeine -40 mg per tablet Commonly known as:  Radha Fuad Take 1 Tab by Mouth Every 6 Hours As Needed. meclizine 25 mg tablet Commonly known as:  ANTIVERT 25 mg.  
  
 meloxicam 15 mg tablet Commonly known as:  MOBIC Take 1 Tab by mouth daily (with breakfast). Prescriptions Sent to Pharmacy Refills  
 butalbital-acetaminophen-caffeine (FIORICET, ESGIC) -40 mg per tablet 1 Sig: Take 1 Tab by Mouth Every 6 Hours As Needed. Class: Normal  
 [facility] [facility] #: [phone] We Performed the Following REFERRAL TO NEUROLOGY [ATQ99 Custom] Follow-up Instructions Return if symptoms worsen or fail to improve. To-Do List   
 02/26/2018 Lab:  MONO SCREEN W/ REFLX EBV Referral Information Referral ID Referred By Referred To  
  
 [id number] Hanna Danielle MD   
   [address] [facility] [address] [place], [address] Phone: [phone] Fax: [phone] Visits Status Start Date End Date 1 New Request 2/26/18 2/26/19 If your referral has a status of pending review or denied, additional information will be sent to support the outcome of this decision. Patient Instructions Headache: Care Instructions Your Care Instructions Headaches have many possible causes. Most headaches aren't a sign of a more serious problem, and they will get better on their own. Home treatment may help you feel better faster. The doctor has checked you carefully, but problems can develop later. If you notice any problems or new symptoms, get medical treatment right away. Follow-up care is a key part of your treatment and safety.  Be sure to make and go to all appointments, and call your doctor if you are having problems. It's also a good idea to know your test results and keep a list of the medicines you take. How can you care for yourself at home? · Do not drive if you have taken a prescription pain medicine. · Rest in a quiet, dark room until your headache is gone. Close your eyes and try to relax or go to sleep. Don't watch TV or read. · Put a cold, moist cloth or cold pack on the painful area for 10 to 20 minutes at a time. Put a thin cloth between the cold pack and your skin. · Use a warm, moist towel or a heating pad set on low to relax tight shoulder and neck muscles. · Have someone gently massage your neck and shoulders. · Take pain medicines exactly as directed. ¨ If the doctor gave you a prescription medicine for pain, take it as prescribed. ¨ If you are not taking a prescription pain medicine, ask your doctor if you can take an over-the-counter medicine. · Be careful not to take pain medicine more often than the instructions allow, because you may get worse or more frequent headaches when the medicine wears off. · Do not ignore new symptoms that occur with a headache, such as a fever, weakness or numbness, vision changes, or confusion. These may be signs of a more serious problem. To prevent headaches · Keep a headache diary so you can figure out what triggers your headaches. Avoiding triggers may help you prevent headaches. Record when each headache began, how long it lasted, and what the pain was like (throbbing, aching, stabbing, or dull). Write down any other symptoms you had with the headache, such as nausea, flashing lights or dark spots, or sensitivity to bright light or loud noise. Note if the headache occurred near your period. List anything that might have triggered the headache, such as certain foods (chocolate, cheese, wine) or odors, smoke, bright light, stress, or lack of sleep. · Find healthy ways to deal with stress.  Headaches are most common during or right after stressful times. Take time to relax before and after you do something that has caused a headache in the past. 
· Try to keep your muscles relaxed by keeping good posture. Check your jaw, face, neck, and shoulder muscles for tension, and try relaxing them. When sitting at a desk, change positions often, and stretch for 30 seconds each hour. · Get plenty of sleep and exercise. · Eat regularly and well. Long periods without food can trigger a headache. · Treat yourself to a massage. Some people find that regular massages are very helpful in relieving tension. · Limit caffeine by not drinking too much coffee, tea, or soda. But don't quit caffeine suddenly, because that can also give you headaches. · Reduce eyestrain from computers by blinking frequently and looking away from the computer screen every so often. Make sure you have proper eyewear and that your monitor is set up properly, about an arm's length away. · Seek help if you have depression or anxiety. Your headaches may be linked to these conditions. Treatment can both prevent headaches and help with symptoms of anxiety or depression. When should you call for help? Call 911 anytime you think you may need emergency care. For example, call if: 
? · You have signs of a stroke. These may include: 
¨ Sudden numbness, paralysis, or weakness in your face, arm, or leg, especially on only one side of your body. ¨ Sudden vision changes. ¨ Sudden trouble speaking. ¨ Sudden confusion or trouble understanding simple statements. ¨ Sudden problems with walking or balance. ¨ A sudden, severe headache that is different from past headaches. ?Call your doctor now or seek immediate medical care if: 
? · You have a new or worse headache. ? · Your headache gets much worse. Where can you learn more? Go to http://nolan-andra.info/. Enter M271 in the search box to learn more about \"Headache: Care Instructions. \" 
 Current as of: October 14, 2016 Content Version: 11.4 © 8382-4407 [employer]. Care instructions adapted under license by [employer] (which disclaims liability or warranty for this information). If you have questions about a medical condition or this instruction, always ask your healthcare professional. [address] any warranty or liability for your use of this information. Mononucleosis: Care Instructions Your Care Instructions Mononucleosis, also called mono, is an infection that is usually caused by the Kassidy-Barr virus. Mono is spread through contact with saliva, mucus from the nose and throat, and sometimes tears or blood. You can get mono by kissing a person who is infected. Or you may get it by sharing a drinking glass or eating utensils with someone who has mono. That person may not be sick at the time or may have had mono long before. Mono may cause your spleen to swell. The spleen is an organ in the upper left side of your belly. A blow to the belly can cause a swollen spleen to break open. In very rare cases, the spleen may burst on its own. Most people recover fully after several weeks. But it may take several months before your normal energy is back. The lymph nodes in your neck may be larger than normal for up to 1 month. Getting lots of rest and keeping your schedule light will help you feel better. Time helps you recover. Follow-up care is a key part of your treatment and safety. Be sure to make and go to all appointments, and call your doctor if you are having problems. It's also a good idea to know your test results and keep a list of the medicines you take. How can you care for yourself at home? · Get plenty of rest. Stay in bed until you feel well enough to be up. · Drink plenty of fluids, enough so that your urine is light yellow or clear like water.  If you have kidney, heart, or liver disease and have to limit fluids, talk with your doctor before you increase the amount of fluids you drink. · Take your medicines exactly as prescribed. Call your doctor if you think you are having a problem with your medicine. · For a sore throat, suck on lozenges or gargle with salt water. To make salt water, mix 1 teaspoon of salt in 8 ounces of warm water. · Take an over-the-counter pain medicine, such as acetaminophen (Tylenol), ibuprofen (Advil, Motrin), or naproxen (Aleve) for a sore throat or headache or to lower a fever. Read and follow all instructions on the label. · Do not take two or more pain medicines at the same time unless the doctor told you to. Many pain medicines have acetaminophen, which is Tylenol. Too much acetaminophen (Tylenol) can be harmful. · Do not play contact sports for 4 weeks. Do not lift anything heavy. Too much activity increases the chance that your spleen may break open. · Try not to spread the virus to others. Do not kiss or share dishes, glasses, eating utensils, or toothbrushes for at least two weeks. The virus is spread when saliva from an infected person gets in another person's mouth. It is hard to know how long you may be contagious. · If you know you have mono, do not donate blood. There is a chance of spreading the virus through blood products. When should you call for help? Call 911 anytime you think you may need emergency care. For example, call if: 
? · You passed out (lost consciousness). ?Call your doctor now or seek immediate medical care if: 
? · You have new or worse belly pain. ? · You have signs of needing more fluids. You have sunken eyes and a dry mouth, and you pass only a little urine. ? · You are dizzy or lightheaded, or you feel like you may faint. ? · You cannot swallow fluids. ? Watch closely for changes in your health, and be sure to contact your doctor if: 
? · You do not get better as expected. Where can you learn more? Go to http://nolan-andra.info/. Enter A123 in the search box to learn more about \"Mononucleosis: Care Instructions. \" Current as of: March 3, 2017 Content Version: 11.4 © 3855-5216 [employer]. Care instructions adapted under license by [employer] (which disclaims liability or warranty for this information). If you have questions about a medical condition or this instruction, always ask your healthcare professional. [address] any warranty or liability for your use of this information. Introducing [facility]! [facility] introduces IRI patient portal. Now you can access parts of your medical record, email your doctor's office, and request medication refills online. 1. In your internet browser, go to [email or web address] 2. Click on the First Time User? Click Here link in the Sign In box. You will see the New Member Sign Up page. 3. Enter your IRI Access Code exactly as it appears below. You will not need to use this code after youve completed the sign-up process. If you do not sign up before the expiration date, you must request a new code. · IRI Access Code: [email or web address] Expires: 5/27/2018  3:39 PM 
 
4. Enter the last four digits of your Social Security Number (xxxx) and Date of Birth (mm/dd/yyyy) as indicated and click Submit. You will be taken to the next sign-up page. 5. Create a IRI ID. This will be your IRI login ID and cannot be changed, so think of one that is secure and easy to remember. 6. Create a IRI password. You can change your password at any time. 7. Enter your Password Reset Question and Answer. This can be used at a later time if you forget your password. 8. Enter your e-mail address. You will receive e-mail notification when new information is available in [address] 9. Click Sign Up. You can now view and download portions of your medical record. 10. Click the Download Summary menu link to download a portable copy of your medical information. If you have questions, please visit the Frequently Asked Questions section of the [employer] website. Remember, [employer] is NOT to be used for urgent needs. For medical emergencies, dial 911. Now available from your iPhone and Android! Please provide this summary of care documentation to your next provider. Your primary care clinician is listed as Gaviota Mendoza. If you have any questions after today's visit, please call [phone].

## 2018-02-26 NOTE — PATIENT INSTRUCTIONS
Headache: Care Instructions  Your Care Instructions    Headaches have many possible causes. Most headaches aren't a sign of a more serious problem, and they will get better on their own. Home treatment may help you feel better faster. The doctor has checked you carefully, but problems can develop later. If you notice any problems or new symptoms, get medical treatment right away. Follow-up care is a key part of your treatment and safety. Be sure to make and go to all appointments, and call your doctor if you are having problems. It's also a good idea to know your test results and keep a list of the medicines you take. How can you care for yourself at home? · Do not drive if you have taken a prescription pain medicine. · Rest in a quiet, dark room until your headache is gone. Close your eyes and try to relax or go to sleep. Don't watch TV or read. · Put a cold, moist cloth or cold pack on the painful area for 10 to 20 minutes at a time. Put a thin cloth between the cold pack and your skin. · Use a warm, moist towel or a heating pad set on low to relax tight shoulder and neck muscles. · Have someone gently massage your neck and shoulders. · Take pain medicines exactly as directed. ¨ If the doctor gave you a prescription medicine for pain, take it as prescribed. ¨ If you are not taking a prescription pain medicine, ask your doctor if you can take an over-the-counter medicine. · Be careful not to take pain medicine more often than the instructions allow, because you may get worse or more frequent headaches when the medicine wears off. · Do not ignore new symptoms that occur with a headache, such as a fever, weakness or numbness, vision changes, or confusion. These may be signs of a more serious problem. To prevent headaches  · Keep a headache diary so you can figure out what triggers your headaches. Avoiding triggers may help you prevent headaches.  Record when each headache began, how long it lasted, and what the pain was like (throbbing, aching, stabbing, or dull). Write down any other symptoms you had with the headache, such as nausea, flashing lights or dark spots, or sensitivity to bright light or loud noise. Note if the headache occurred near your period. List anything that might have triggered the headache, such as certain foods (chocolate, cheese, wine) or odors, smoke, bright light, stress, or lack of sleep. · Find healthy ways to deal with stress. Headaches are most common during or right after stressful times. Take time to relax before and after you do something that has caused a headache in the past.  · Try to keep your muscles relaxed by keeping good posture. Check your jaw, face, neck, and shoulder muscles for tension, and try relaxing them. When sitting at a desk, change positions often, and stretch for 30 seconds each hour. · Get plenty of sleep and exercise. · Eat regularly and well. Long periods without food can trigger a headache. · Treat yourself to a massage. Some people find that regular massages are very helpful in relieving tension. · Limit caffeine by not drinking too much coffee, tea, or soda. But don't quit caffeine suddenly, because that can also give you headaches. · Reduce eyestrain from computers by blinking frequently and looking away from the computer screen every so often. Make sure you have proper eyewear and that your monitor is set up properly, about an arm's length away. · Seek help if you have depression or anxiety. Your headaches may be linked to these conditions. Treatment can both prevent headaches and help with symptoms of anxiety or depression. When should you call for help? Call 911 anytime you think you may need emergency care. For example, call if:  ? · You have signs of a stroke. These may include:  ¨ Sudden numbness, paralysis, or weakness in your face, arm, or leg, especially on only one side of your body. ¨ Sudden vision changes.   ¨ Sudden trouble speaking. ¨ Sudden confusion or trouble understanding simple statements. ¨ Sudden problems with walking or balance. ¨ A sudden, severe headache that is different from past headaches. ?Call your doctor now or seek immediate medical care if:  ? · You have a new or worse headache. ? · Your headache gets much worse. Where can you learn more? Go to http://nolan-andra.info/. Enter M271 in the search box to learn more about \"Headache: Care Instructions. \"  Current as of: October 14, 2016  Content Version: 11.4  © 0576-2231 [email or web address]. Care instructions adapted under license by [employer] (which disclaims liability or warranty for this information). If you have questions about a medical condition or this instruction, always ask your healthcare professional. [address] any warranty or liability for your use of this information. Mononucleosis: Care Instructions  Your Care Instructions    Mononucleosis, also called mono, is an infection that is usually caused by the Kassidy-Barr virus. Mono is spread through contact with saliva, mucus from the nose and throat, and sometimes tears or blood. You can get mono by kissing a person who is infected. Or you may get it by sharing a drinking glass or eating utensils with someone who has mono. That person may not be sick at the time or may have had mono long before. Mono may cause your spleen to swell. The spleen is an organ in the upper left side of your belly. A blow to the belly can cause a swollen spleen to break open. In very rare cases, the spleen may burst on its own. Most people recover fully after several weeks. But it may take several months before your normal energy is back. The lymph nodes in your neck may be larger than normal for up to 1 month. Getting lots of rest and keeping your schedule light will help you feel better. Time helps you recover.   Follow-up care is a key part of your treatment and safety. Be sure to make and go to all appointments, and call your doctor if you are having problems. It's also a good idea to know your test results and keep a list of the medicines you take. How can you care for yourself at home? · Get plenty of rest. Stay in bed until you feel well enough to be up. · Drink plenty of fluids, enough so that your urine is light yellow or clear like water. If you have kidney, heart, or liver disease and have to limit fluids, talk with your doctor before you increase the amount of fluids you drink. · Take your medicines exactly as prescribed. Call your doctor if you think you are having a problem with your medicine. · For a sore throat, suck on lozenges or gargle with salt water. To make salt water, mix 1 teaspoon of salt in 8 ounces of warm water. · Take an over-the-counter pain medicine, such as acetaminophen (Tylenol), ibuprofen (Advil, Motrin), or naproxen (Aleve) for a sore throat or headache or to lower a fever. Read and follow all instructions on the label. · Do not take two or more pain medicines at the same time unless the doctor told you to. Many pain medicines have acetaminophen, which is Tylenol. Too much acetaminophen (Tylenol) can be harmful. · Do not play contact sports for 4 weeks. Do not lift anything heavy. Too much activity increases the chance that your spleen may break open. · Try not to spread the virus to others. Do not kiss or share dishes, glasses, eating utensils, or toothbrushes for at least two weeks. The virus is spread when saliva from an infected person gets in another person's mouth. It is hard to know how long you may be contagious. · If you know you have mono, do not donate blood. There is a chance of spreading the virus through blood products. When should you call for help? Call 911 anytime you think you may need emergency care. For example, call if:  ? · You passed out (lost consciousness).    ?Call your doctor now or seek immediate medical care if:  ? · You have new or worse belly pain. ? · You have signs of needing more fluids. You have sunken eyes and a dry mouth, and you pass only a little urine. ? · You are dizzy or lightheaded, or you feel like you may faint. ? · You cannot swallow fluids. ? Watch closely for changes in your health, and be sure to contact your doctor if:  ? · You do not get better as expected. Where can you learn more? Go to http://nolan-andra.info/. Enter C123 in the search box to learn more about \"Mononucleosis: Care Instructions. \"  Current as of: March 3, 2017  Content Version: 11.4  © 7625-6000 [employer]. Care instructions adapted under license by [employer] (which disclaims liability or warranty for this information). If you have questions about a medical condition or this instruction, always ask your healthcare professional. [address] any warranty or liability for your use of this information.

## 2018-02-26 NOTE — PROGRESS NOTES
Chief Complaint   Patient presents with    Follow Up Chronic Condition     pt here for follow up chronic conditions HTN   [facility] Follow Up     Pt for follow up ER visit      1. Have you been to the ER, urgent care clinic since your last visit? Hospitalized since your last visit?pt seen for headaches and dizznies    2. Have you seen or consulted any other health care providers outside of the [address] [place] [address] since your last visit? Include any pap smears or colon screening. No     [facility]  Brad De Los Santos comes in for follow-up care. Headache: Patient has persistent headache. This has been ongoing for days. She does have a previous history of migraine headache. This current one feels different from her migraines and feels like vice-like . She has taken various medications without much relief. She was seen in the emergency room where a CT scan of the head was done that was unremarkable. She denies visual changes or focal weakness. She denies numbness and tingling of the extremities. We discussed headache management. Currently she is taking Mobic for knee pain. Will avoid Aleve as he is on the Mobic. We discussed various medication options. She will take Fioricet as needed for the headache and I will refer her to the neurologist for further evaluation and management of chronic headache. Knee pain: Patient has been seen by the orthopedist.  Did have intra-articular steroid injection given. Feels stable. Hypertension: Patient's blood pressure is stable. Past Medical History  Past Medical History:   Diagnosis Date    Depression        Surgical History  Past Surgical History:   Procedure Laterality Date    HX KNEE ARTHROSCOPY      KNEE SCOPE, ALLOGRAFT IMPANT          Medications  Current Outpatient Prescriptions   Medication Sig Dispense Refill    meloxicam (MOBIC) 15 mg tablet Take 1 Tab by mouth daily (with breakfast).  30 Tab 1    butalbital-acetaminophen-caffeine (FIORICET, ESGIC) -40 mg per tablet Take 1 Tab by Mouth Every 4 Hours As Needed.  meclizine (ANTIVERT) 25 mg tablet 25 mg. Allergies  Allergies   Allergen Reactions    Augmentin [Amoxicillin-Pot Clavulanate] Nausea and Vomiting       Family History  Family History   Problem Relation Age of Onset    Cancer Mother     Cancer Father     No Known Problems Brother        Social History  Social History     Social History    Marital status:      Spouse name: N/A    Number of children: N/A    Years of education: N/A     Occupational History    Not on file.      Social History Main Topics    Smoking status: Never Smoker    Smokeless tobacco: Never Used    Alcohol use No    Drug use: No    Sexual activity: Not on file     Other Topics Concern    Not on file     Social History Narrative       Review of Systems  Review of Systems - History obtained from chart review and the patient  General ROS: negative for - chills, fatigue, fever or malaise  Psychological ROS: positive for - mood swings  Ophthalmic ROS: positive for - uses glasses  ENT ROS: negative  Allergy and Immunology ROS: negative  Hematological and Lymphatic ROS: negative  Endocrine ROS: negative  Respiratory ROS: no cough, shortness of breath, or wheezing  Cardiovascular ROS: no chest pain or dyspnea on exertion  Gastrointestinal ROS: no abdominal pain, change in bowel habits, or black or bloody stools  Genito-Urinary ROS: negative  Musculoskeletal ROS: negative  Neurological ROS: positive for - headaches  Dermatological ROS: negative    Vital Signs  Visit Vitals    /84 (BP 1 Location: Left arm, BP Patient Position: Sitting)    Pulse 71    Temp 98.1 °F (36.7 °C) (Oral)    Resp 20    Ht 5' 3\" (1.6 m)    Wt 203 lb (92.1 kg)    SpO2 98%    BMI 35.96 kg/m2         Physical Exam  Physical Examination: General appearance - alert, well appearing, and in no distress and acyanotic, in no respiratory distress  Mental status - alert, oriented to person, place, and time, affect appropriate to mood  Eyes - sclera anicteric  Chest - clear to auscultation, no wheezes, rales or rhonchi, symmetric air entry  Heart - normal rate and regular rhythm, S1 and S2 normal  Neurological - motor and sensory grossly normal bilaterally  Musculoskeletal - no joint tenderness, deformity or swelling  Extremities - intact peripheral pulses    Results  Results for orders placed or performed in visit on 09/21/10   CHLAMYDIA/GC AMPLIFIED   Result Value Ref Range    Sample type THINPREP     Source CERVIX     Chlamydia amplified NEGATIVE  NEGATIVE    N. gonorrhea, amplified NEGATIVE  NEGATIVE    Comment (NOTE)    HEPATITIS PANEL, ACUTE   Result Value Ref Range    Hepatitis B core, IgM NEGATIVE  NEGATIVE    __       ---------------------------------------------------    Hepatitis B surface Ag .14 <1.00 Index    Hep B surface Ag Interp. NEGATIVE  NEGATIVE    __       ---------------------------------------------------    Hepatitis A, IgM NEGATIVE  NEGATIVE    __       ---------------------------------------------------    Hepatitis C virus Ab <0.02 <0.80 Index    Hep C  virus Ab Interp. NEGATIVE  NEGATIVE    Hep C  virus Ab comment        HIV-1 AB   Result Value Ref Range    HIV 1 Ab Negative Negative    HIV-1 Ab, W Blot Not Indicated Negative   RPR   Result Value Ref Range    RPR NONREACTIVE NONREACTIVE       ASSESSMENT and PLAN    ICD-10-CM ICD-9-CM    1. Other headache syndrome G44.89 339.89 REFERRAL TO NEUROLOGY      butalbital-acetaminophen-caffeine (FIORICET, ESGIC) -40 mg per tablet   2. Infectious mononucleosis, with other complication, infectious mononucleosis due to unspecified organism B27.99 075 MONO SCREEN W/ REFLX EBV   3.  Acute knee pain, unspecified laterality M25.569 719.46      lab results and schedule of future lab studies reviewed with patient  reviewed diet, exercise and weight control  reviewed medications and side effects in detail    I have discussed the diagnosis with the patient and the intended plan of care as seen in the above orders. The patient has received an after-visit summary and questions were answered concerning future plans. I have discussed medication, side effects, and warnings with the patient in detail. The patient verbalized understanding and is in agreement with the plan of care. The patient will contact the office with any additional concerns.     Saroj Choe MD

## 2018-03-12 ENCOUNTER — OFFICE VISIT (OUTPATIENT)
Dept: NEUROLOGY | Age: 53
End: 2018-03-12

## 2018-03-12 VITALS
HEIGHT: 63 IN | WEIGHT: 205 LBS | OXYGEN SATURATION: 98 % | RESPIRATION RATE: 18 BRPM | HEART RATE: 66 BPM | BODY MASS INDEX: 36.32 KG/M2 | TEMPERATURE: 97.5 F | DIASTOLIC BLOOD PRESSURE: 90 MMHG | SYSTOLIC BLOOD PRESSURE: 140 MMHG

## 2018-03-12 DIAGNOSIS — R42 LIGHTHEADEDNESS: ICD-10-CM

## 2018-03-12 DIAGNOSIS — R51.9 WORSENING HEADACHES: Primary | ICD-10-CM

## 2018-03-12 DIAGNOSIS — H53.9 VISUAL DISTURBANCES: ICD-10-CM

## 2018-03-12 RX ORDER — ZOLMITRIPTAN 5 MG/1
TABLET, ORALLY DISINTEGRATING ORAL
Qty: 9 TAB | Refills: 2 | Status: SHIPPED | OUTPATIENT
Start: 2018-03-12 | End: 2021-09-22

## 2018-03-12 RX ORDER — LANOLIN ALCOHOL/MO/W.PET/CERES
1000 CREAM (GRAM) TOPICAL DAILY
COMMUNITY

## 2018-03-12 RX ORDER — MELATONIN
DAILY
COMMUNITY
End: 2020-07-22 | Stop reason: DRUGHIGH

## 2018-03-12 RX ORDER — ASCORBIC ACID 500 MG
TABLET ORAL
COMMUNITY
End: 2021-09-22

## 2018-03-12 NOTE — PROGRESS NOTES
Chief Complaint   Patient presents with   [facility] Establish Care     NP: Headaches. Patient states that she has been getting headaches for as long as she can remember but they have become more frequent. Patient states that she has a headache almost everyday. Patient placed in room 2. Chart and medications reviewed with patient.

## 2018-03-12 NOTE — PATIENT INSTRUCTIONS
Please have tests complete and follow up afterwards. Stop taking over the counter headache medications including Tylenol. Avoid Fioricet for headaches. Take Zomig as needed for migraines. You may try Melatonin as needed for sleep. This can be purchased over the counter.

## 2018-03-12 NOTE — PROGRESS NOTES
[facility]  [address] [facility], [address] [place] [address] , [facility], [address]  [facility] Andriy Fermin, [address]  Office:  [phone]  Fax: [phone]    Referring: Irena Austin MD    Chief Complaint   Patient [facility] with   [facility]     NP: Headaches. Patient states that she has been getting headaches for as long as she can remember but they have become more frequent. Patient states that she has a headache almost everyday. This is a 50year old female who presents for new patient evaluation with chief complaint of headaches. Headaches started years ago. Starting around onset of menses. She endorses almost daily headache. Having 5 migraine days a month. Denies having a headache right now. She said her migraine pain is in the back of her head and along the crown. Endorses worse on the left side of the head. She endorses a throbbing pain. Worse with movement. If she stands it will throb. Migraines can last for 3-4 hours. Headaches can last all day. She said she will just go to bed. She can wake up with a headache not a migriane. Positive light and sound sensitivity. Positive nausea. Denies vomiting. Denies focal deficits. She endorses seeing \"little floaters. \" With a migraine she gets burst of lights that occurs with the headaches. Denies visual disturbance prior to migraine onset. She endorses speech disturbance with migraine. She says that she will have to talk slow in order to get her words out. She said that some people will look at her and not understand what she has said. She said this is due to lack of concentration. Having 16 headache days a month. Denies lightheadedness or feeling like she is going to pass out. Denies seeing the room spinning. Denies waking up on the floor unsure how she got there. Denies biting her tongue or losing her water in the middle the night. Denies worse headache of her life.  She was seen at [facility] ER for headache on February 12th. This is when she had a bad headache for 4 days. She said not a migraine. She usually can get this down to a manageable pain taking a BC powder. She said she got up for work in the morning and she was having trouble getting ready. She said that she went to go iron her clothes and then remembered that she should be eating her breakfast. She thought maybe this was related to an increased blood pressure. She drove herself to the urgent care. Had some lightheadedness and dizziness. Denies vision loss. Denies weakness. Denies facial droop. Denies trouble walking. Denies trouble swallowing. She asked them to take her vitals. She said her BP was \"138 over something. \" She was counseled to go to the ER for \"possible stroke. \"  She described this as a sensation. She called her son to take her to the ER. She had a negative head CT. She had a CT of her sinuses complete. She was given Fioricet for headache. She was given Afrin nasal spray. Apparently headache thought to be sinus related. When she gets a headache she takes Tylenol. She takes this 3 times a week. She has taken a BC before that helps some. She takes Fioricet for the migraines. She used 12 tablets already within the past month. She had knee surgery 6 years ago. She started taking Mobic daily for her knee several months ago. She also gets injections for the knee pain. Postive family history of migraine. Endorses poor sleep. 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 She endorses this is very disruptive for her sleep. She was previously given Vistaril for sleep, but said that this made her too drowsy. She has previously tried melatonin. This has been a while. She says that she retires in September. She is just trying to deal with it at this time. Denies tobacco use.     Past Medical History:   Diagnosis Date    Depression Past Surgical History:   Procedure Laterality Date    HX KNEE ARTHROSCOPY      KNEE SCOPE, ALLOGRAFT IMPANT         Current Outpatient Prescriptions   Medication Sig Dispense Refill    cholecalciferol (VITAMIN D3) 1,000 unit tablet Take  by mouth daily.  ascorbic acid, vitamin C, (VITAMIN C) 500 mg tablet Take  by mouth.  cyanocobalamin (VITAMIN B-12) 1,000 mcg tablet Take 1,000 mcg by mouth daily.  ZOLMitriptan (ZOMIG-ZMT) 5 mg disintegrating tablet One tab at HA onset, may repeat x1 > 2 hours if needed. Max 10 mg/24 hours 9 Tab 2    meloxicam (MOBIC) 15 mg tablet Take 1 Tab by mouth daily (with breakfast). 30 Tab 1        Allergies   Allergen Reactions    Augmentin [Amoxicillin-Pot Clavulanate] Nausea and Vomiting       Social History   Substance Use Topics    Smoking status: Never Smoker    Smokeless tobacco: Never Used    Alcohol use No       Family History   Problem Relation Age of Onset    Cancer Mother     Cancer Father     No Known Problems Brother        Review of Systems:  Pertinent positives and negatives as noted otherwise comprehensive review is negative. Physical Examination:    Visit Vitals    /90    Pulse 66    Temp 97.5 °F (36.4 °C) (Oral)    Resp 18    Ht 5' 3\" (1.6 m)    Wt 93 kg (205 lb)    SpO2 98%    BMI 36.31 kg/m2     General:  Well defined, nourished, and groomed individual in no acute distress. Neck: Supple, nontender, no bruits. Heart: Regular rate and rhythm, no murmurs, rub, or gallop. Normal S1S2. Lungs:  Clear to auscultation bilaterally with equal chest expansion, no cough, no wheeze  Musculoskeletal:  Extremities revealed no edema. Psych:  Good mood and bright affect    Neurological Examination:     Mental Status:   Alert and oriented to person, place, and time with recent and remote memory intact. Attention span and concentration are normal. Speech is fluent with a full fund of knowledge.       Cranial Nerves:    II, III, IV, VI:  Visual acuity grossly intact. Visual fields are normal.    Pupils are equal, round, and reactive to light and accommodation. Extra-ocular movements are full and fluid. Fundoscopic exam was benign, no ptosis or nystagmus. V-XII: Hearing is grossly intact. Facial features are symmetric. The palate rises symmetrically and the tongue protrudes midline. Sternocleidomastoids 5/5. Motor Examination: Normal tone, bulk, and strength, 5/5 muscle strength throughout. No cogwheel rigidity or clonus present. Sensory exam:  Sensory examination is intact to primary modalities and there is no lateralization of sensation. Coordination:  Finger to nose was normal.   No resting or intention tremor    Gait and Station:  Steady gait. Normal arm swing. No Rhomberg or pronator drift. No muscle wasting or fasiculations noted. Reflexes:  DTRs 2+ throughout. Toes downgoing. Impression/Plan  Ezio Walker is a 50 y.o. female whose history and physical are consistent with worsening headache and visual disturbance. Patient endorses a long history of headaches. She endorses getting about 6 migraine days a month. Having 16 headache days a month. She recently presented to [facility] emergency room for headache and dizziness. She had a negative head CT. Today she does not have a headache. Her examination is reassuringly nonfocal.  Significant history of poor sleep related to alternating shift work. She works 10 weeks on days and 10 weeks on nights. Recommend trying over the counter Melatonin for sleep aid. Certainly poor sleep and her getting only 2-3 hours of sleep could exacerbate underlying headaches. We will move forward with MRI of the brain looking for further structural abnormality that would be consistent with a headache. Obtain carotid Doppler looking for any stenosis. We discussed analgesic overuse.   We discussed that taking something over-the-counter and even prescription such as Fioricet more than twice a week as the propensity to drive the headache process. We discussed stopping over-the-counter headache medication such as Tylenol and BC powder. Also to stop Fioricet for migraine. We discussed the importance of routine sleep, healthy diet, and exercise as nonpharmacological headache management. Patient does not want to take a daily medication. We will move forward with abortive headache therapy with Zomig. Patient says that she thinks she has taken this in the past. Discussed medication, indication, side effects, and dosing. Patient verbalized understanding. She is to continue to monitor her headaches. She will follow-up after testing is complete. All questions addressed and patient is agreeable with plan of care. Diagnoses and all orders for this visit:    1. Worsening headaches  -     MRI BRAIN W WO CONT; Future  -     DUPLEX CAROTID BILATERAL; Future    2. Visual disturbances  -     MRI BRAIN W WO CONT; Future  -     DUPLEX CAROTID BILATERAL; Future    3. Lightheadedness  -     MRI BRAIN W WO CONT; Future  -     DUPLEX CAROTID BILATERAL; Future    Other orders  -     ZOLMitriptan (ZOMIG-ZMT) 5 mg disintegrating tablet; One tab at HA onset, may repeat x1 > 2 hours if needed. Max 10 mg/24 hours    Signed By: Aparna Vila NP    This note will not be viewable in [address] This note was created using voice recognition software. Despite editing, there may be syntax errors.

## 2018-03-12 NOTE — MR AVS SNAPSHOT
[address] [address] [address] [facility] 
[phone] Patient: Bishop Gayle MRN: [id number] :1965 Visit Information Date & Time Provider Department Dept. Phone Encounter #  
 3/12/2018  1:00 PM Alexandra Downs NP [address] at [address] [place] [address] [id number] Follow-up Instructions Return for after tests. Upcoming Health Maintenance Date Due DTaP/Tdap/Td series (1 - Tdap) 1988 FOBT Q 1 YEAR AGE 50-75 2017 BREAST CANCER SCRN MAMMOGRAM 9/15/2019 PAP AKA CERVICAL CYTOLOGY 2020 Allergies as of 3/12/2018  Review Complete On: 3/12/2018 By: Alexandra Downs NP Severity Noted Reaction Type Reactions Augmentin [Amoxicillin-pot Clavulanate]  2018    Nausea and Vomiting Current Immunizations  Never Reviewed No immunizations on file. Not reviewed this visit You Were Diagnosed With   
  
 Codes Comments Worsening headaches    -  Primary ICD-10-CM: X58 ICD-9-CM: 703. 0 Visual disturbances     ICD-10-CM: H53.9 ICD-9-CM: 368.9 Lightheadedness     ICD-10-CM: L56 ICD-9-CM: 780.4 Vitals BP Pulse Temp Resp Height(growth percentile) Weight(growth percentile) 140/90 66 97.5 °F (36.4 °C) (Oral) 18 5' 3\" (1.6 m) 205 lb (93 kg) SpO2 BMI OB Status Smoking Status 98% 36.31 kg/m2 Menopause Never Smoker BMI and BSA Data Body Mass Index Body Surface Area  
 36.31 kg/m 2 2.03 m 2 Preferred Pharmacy Pharmacy Name Phone [address] [facility], [address] [place] [address] [phone] Your Updated Medication List  
  
   
This list is accurate as of 3/12/18  1:53 PM.  Always use your most recent med list.  
  
  
  
  
 meloxicam 15 mg tablet Commonly known as:  MOBIC Take 1 Tab by mouth daily (with breakfast). VITAMIN B-12 1,000 mcg tablet Generic drug:  cyanocobalamin Take 1,000 mcg by mouth daily. VITAMIN C 500 mg tablet Generic drug:  ascorbic acid (vitamin C) Take  by mouth. VITAMIN D3 1,000 unit tablet Generic drug:  cholecalciferol Take  by mouth daily. ZOLMitriptan 5 mg disintegrating tablet Commonly known as:  ZOMIG-ZMT One tab at HA onset, may repeat x1 > 2 hours if needed. Max 10 mg/24 hours Prescriptions Sent to Pharmacy Refills ZOLMitriptan (ZOMIG-ZMT) 5 mg disintegrating tablet 2 Sig: One tab at HA onset, may repeat x1 > 2 hours if needed. Max 10 mg/24 hours Class: Normal  
 Pharmacy: [facility], [address] [facility] #: [phone] Follow-up Instructions Return for after tests. To-Do List   
 03/12/2018 Imaging:  DUPLEX CAROTID BILATERAL   
  
 03/12/2018 Imaging:  MRI BRAIN W WO CONT Patient Instructions Please have tests complete and follow up afterwards. Stop taking over the counter headache medications including Tylenol. Avoid Fioricet for headaches. Take Zomig as needed for migraines. You may try Melatonin as needed for sleep. This can be purchased over the counter. Introducing [facility]! [facility] introduces [employer] patient portal. Now you can access parts of your medical record, email your doctor's office, and request medication refills online. 1. In your internet browser, go to [email or web address] 2. Click on the First Time User? Click Here link in the Sign In box. You will see the New Member Sign Up page. 3. Enter your [employer] Access Code exactly as it appears below. You will not need to use this code after youve completed the sign-up process. If you do not sign up before the expiration date, you must request a new code. · [employer] Access Code: [email or web address] Expires: 5/27/2018  4:39 PM 
 
 4. Enter the last four digits of your Social Security Number (xxxx) and Date of Birth (mm/dd/yyyy) as indicated and click Submit. You will be taken to the next sign-up page. 5. Create a [employer] ID. This will be your [employer] login ID and cannot be changed, so think of one that is secure and easy to remember. 6. Create a [employer] password. You can change your password at any time. 7. Enter your Password Reset Question and Answer. This can be used at a later time if you forget your password. 8. Enter your e-mail address. You will receive e-mail notification when new information is available in [address] 9. Click Sign Up. You can now view and download portions of your medical record. 10. Click the Download Summary menu link to download a portable copy of your medical information. If you have questions, please visit the Frequently Asked Questions section of the [employer] website. Remember, [employer] is NOT to be used for urgent needs. For medical emergencies, dial 911. Now available from your iPhone and Android! Please provide this summary of care documentation to your next provider. Your primary care clinician is listed as Gaviota Mendoza. If you have any questions after today's visit, please call [phone].

## 2018-03-12 NOTE — PROGRESS NOTES
[address], [address], [place], [address]  [address] [facility] Center[facility]ri[facility] romano, [address]  Office:  [phone]  Fax: [phone]    Referring: Marek Alejo MD    No chief complaint on file. This is a 50year old female who presents for new patient evaluation with chief complaint of headaches. Headaches started many years ago. She endorses almost daily headache. Denies having a headache right now. She was seen at [facility] for headache on February 12th. She had a negative head CT. She was given Fioricet for headache. Past Medical History:   Diagnosis Date    Depression        Past Surgical History:   Procedure Laterality Date    HX KNEE ARTHROSCOPY      KNEE SCOPE, ALLOGRAFT IMPANT         Current Outpatient Prescriptions   Medication Sig Dispense Refill    butalbital-acetaminophen-caffeine (FIORICET, ESGIC) -40 mg per tablet Take 1 Tab by Mouth Every 6 Hours As Needed. 20 Tab 1    meclizine (ANTIVERT) 25 mg tablet 25 mg.      meloxicam (MOBIC) 15 mg tablet Take 1 Tab by mouth daily (with breakfast). 30 Tab 1        Allergies   Allergen Reactions    Augmentin [Amoxicillin-Pot Clavulanate] Nausea and Vomiting       Social History   Substance Use Topics    Smoking status: Never Smoker    Smokeless tobacco: Never Used    Alcohol use No       Family History   Problem Relation Age of Onset    Cancer Mother     Cancer Father     No Known Problems Brother        Review of Systems:  Pertinent positives and negatives as noted otherwise comprehensive review is negative. {Ros - complete:[id number]}    Physical Examination:    There were no vitals taken for this visit. General:  Well defined, nourished, and groomed individual in no acute distress. Neck: Supple, nontender, no bruits. Heart: Regular rate and rhythm, no murmurs, rub, or gallop. Normal S1S2.   Lungs:  Clear to auscultation bilaterally with equal chest expansion, no cough, no wheeze  Musculoskeletal:  Extremities revealed no edema. Psych:  Good mood and bright affect    Neurological Examination:     Mental Status:   Alert and oriented to person, place, and time with recent and remote memory intact. Attention span and concentration are normal. Speech is fluent with a full fund of knowledge. Cranial Nerves:    II, III, IV, VI:  Visual acuity grossly intact. Visual fields are normal.    Pupils are equal, round, and reactive to light and accommodation. Extra-ocular movements are full and fluid. Fundoscopic exam was benign, no ptosis or nystagmus. V-XII: Hearing is grossly intact. Facial features are symmetric. The palate rises symmetrically and the tongue protrudes midline. Sternocleidomastoids 5/5. Motor Examination: Normal tone, bulk, and strength, 5/5 muscle strength throughout. No cogwheel rigidity or clonus present. Sensory exam:  Normal throughout to pinprick, temperature, and vibration sense. Normal proprioception. Coordination:  Finger to nose was normal.   No resting or intention tremor    Gait and Station:  Steady gait. Normal arm swing. No Rhomberg or pronator drift. No muscle wasting or fasiculations noted. Reflexes:  DTRs 2+ throughout. Toes downgoing. Impression/Plan  Prosper Perez is a 50 y.o. female whose history and physical are consistent with ***.      {There are no diagnoses linked to this encounter. (Refresh or delete this SmartLink)}        Signed By: Maya Low NP    This note will not be viewable in [address] This note was created using voice recognition software. Despite editing, there may be syntax errors.

## 2018-03-21 ENCOUNTER — PATIENT OUTREACH (OUTPATIENT)
Dept: FAMILY MEDICINE CLINIC | Age: 53
End: 2018-03-21

## 2018-03-26 ENCOUNTER — HOSPITAL ENCOUNTER (OUTPATIENT)
Dept: LAB | Age: 53
Discharge: HOME | End: 2018-03-26
Payer: COMMERCIAL

## 2018-03-26 ENCOUNTER — HOSPITAL ENCOUNTER (OUTPATIENT)
Age: 53
Discharge: HOME | End: 2018-03-26
Attending: NURSE PRACTITIONER
Payer: COMMERCIAL

## 2018-03-26 ENCOUNTER — HOSPITAL ENCOUNTER (OUTPATIENT)
Dept: VASCULAR SURGERY | Age: 53
Discharge: HOME | End: 2018-03-26
Attending: NURSE PRACTITIONER
Payer: COMMERCIAL

## 2018-03-26 DIAGNOSIS — H53.9 VISUAL DISTURBANCES: ICD-10-CM

## 2018-03-26 DIAGNOSIS — R42 LIGHTHEADEDNESS: ICD-10-CM

## 2018-03-26 DIAGNOSIS — R51.9 WORSENING HEADACHES: ICD-10-CM

## 2018-03-26 DIAGNOSIS — B27.99 INFECTIOUS MONONUCLEOSIS, WITH OTHER COMPLICATION, INFECTIOUS MONONUCLEOSIS DUE TO UNSPECIFIED ORGANISM: ICD-10-CM

## 2018-03-26 PROCEDURE — 74011250636 HC RX REV CODE- 250/636: Performed by: NURSE PRACTITIONER

## 2018-03-26 PROCEDURE — 70553 MRI BRAIN STEM W/O & W/DYE: CPT

## 2018-03-26 PROCEDURE — 86308 HETEROPHILE ANTIBODY SCREEN: CPT | Performed by: FAMILY MEDICINE

## 2018-03-26 PROCEDURE — 36415 COLL VENOUS BLD VENIPUNCTURE: CPT | Performed by: FAMILY MEDICINE

## 2018-03-26 PROCEDURE — 93880 EXTRACRANIAL BILAT STUDY: CPT

## 2018-03-26 PROCEDURE — 86664 EPSTEIN-BARR NUCLEAR ANTIGEN: CPT | Performed by: FAMILY MEDICINE

## 2018-03-26 PROCEDURE — A9585 GADOBUTROL INJECTION: HCPCS | Performed by: NURSE PRACTITIONER

## 2018-03-26 RX ADMIN — GADOBUTROL 9.5 ML: 604.72 INJECTION INTRAVENOUS at 15:00

## 2018-03-26 NOTE — PROCEDURES
[facility]  *** FINAL REPORT ***    Name: [place] Bijan  MRN: [id number]    Outpatient  : 1965  HIS Order #: [id number]  [id number] [facility] Visit #: [id number]  Date: 26 Mar 2018    TYPE OF TEST: Cerebrovascular Duplex    REASON FOR TEST    Right Carotid:-             Proximal               Mid                 Distal  cm/s  Systolic  Diastolic  Systolic  Diastolic  Systolic  Diastolic  CCA:     28.5      23.0       86.0      30.0       79.0      30.0  Bulb:  ECA:     51.0       9.0  ICA:     60.0      21.0       60.0      28.0       78.0      31.0  ICA/CCA:  0.9       1.0    ICA Stenosis: Normal    Right Vertebral:-  Finding: Antegrade  Sys:       83.0  Danielle:       28.0    Right Subclavian: Normal    Left Carotid:-            Proximal                Mid                 Distal  cm/s  Systolic  Diastolic  Systolic  Diastolic  Systolic  Diastolic  CCA:     91.8      21.0       75.0      23.0       72.0      22.0  Bulb:  ECA:     43.0       9.0  ICA:     45.0      15.0       53.0      27.0       45.0      21.0  ICA/CCA:  0.6       1.3    ICA Stenosis: Normal    Left Vertebral:-  Finding: Antegrade  Sys:       50.0  Danielle:       26.0    Left Subclavian: Normal    INTERPRETATION/FINDINGS  Duplex images were obtained using 2-D gray scale, color flow, and  spectral Doppler analysis. 1. No evidence of significant arterial occlusive disease in the  internal carotid arteries without any evidence of visible plaque. 2. No significant stenosis in the external carotid arteries  bilaterally. 3. Antegrade flow in both vertebral arteries. 4. Normal flow in both subclavian arteries. ADDITIONAL COMMENTS  Nodules noted in both thyroids measuring . 36 x .23 cm, .38 x .14 cm in   the right and .52 x .50 cm in the left. I have personally reviewed the data relevant to the interpretation of  this  study. TECHNOLOGIST: Carey Sargent, [facility], RVT/  Signed: 2018 03:36 PM    PHYSICIAN: Sami Montes De Oca MD  Signed: 03/28/2018 08:49 AM

## 2018-03-28 LAB
EBV EA IGG SER-ACNC: <9 U/ML (ref 0–8.9)
EBV NA IGG SER-ACNC: 285 U/ML (ref 0–17.9)
EBV VCA IGG SER-ACNC: >600 U/ML (ref 0–17.9)
EBV VCA IGM SER-ACNC: <36 U/ML (ref 0–35.9)
HETEROPH AB SER QL: NEGATIVE
INTERPRETATION, 169995: ABNORMAL

## 2018-03-30 NOTE — PROGRESS NOTES
Please let patient know the carotid ultrasound did show thyroid nodules. She should set up an appointment to come in and discuss this.   Dana Negrete MD

## 2018-03-30 NOTE — PROGRESS NOTES
Please let patient know that the her EBV test result is negative for current infection. He does indicate a positive infection with active immunity to the same.   Marek Alejo MD

## 2018-04-03 ENCOUNTER — OFFICE VISIT (OUTPATIENT)
Dept: FAMILY MEDICINE CLINIC | Age: 53
End: 2018-04-03

## 2018-04-03 ENCOUNTER — HOSPITAL ENCOUNTER (OUTPATIENT)
Dept: LAB | Age: 53
Discharge: HOME | End: 2018-04-03
Payer: COMMERCIAL

## 2018-04-03 VITALS
DIASTOLIC BLOOD PRESSURE: 88 MMHG | TEMPERATURE: 97.3 F | OXYGEN SATURATION: 96 % | SYSTOLIC BLOOD PRESSURE: 137 MMHG | BODY MASS INDEX: 36.14 KG/M2 | WEIGHT: 204 LBS | HEART RATE: 68 BPM | RESPIRATION RATE: 18 BRPM | HEIGHT: 63 IN

## 2018-04-03 DIAGNOSIS — R51.9 NONINTRACTABLE HEADACHE, UNSPECIFIED CHRONICITY PATTERN, UNSPECIFIED HEADACHE TYPE: ICD-10-CM

## 2018-04-03 DIAGNOSIS — M25.561 CHRONIC PAIN OF BOTH KNEES: ICD-10-CM

## 2018-04-03 DIAGNOSIS — G89.29 CHRONIC PAIN OF BOTH KNEES: ICD-10-CM

## 2018-04-03 DIAGNOSIS — E66.9 OBESITY (BMI 30-39.9): ICD-10-CM

## 2018-04-03 DIAGNOSIS — E04.2 MULTIPLE THYROID NODULES: ICD-10-CM

## 2018-04-03 DIAGNOSIS — E04.2 MULTIPLE THYROID NODULES: Primary | ICD-10-CM

## 2018-04-03 DIAGNOSIS — M25.562 CHRONIC PAIN OF BOTH KNEES: ICD-10-CM

## 2018-04-03 DIAGNOSIS — G93.32 CHRONIC FATIGUE DISORDER: ICD-10-CM

## 2018-04-03 LAB
T3FREE SERPL-MCNC: 3.3 PG/ML (ref 2.18–3.98)
T4 FREE SERPL-MCNC: 1.1 NG/DL (ref 0.7–1.5)
TSH SERPL DL<=0.05 MIU/L-ACNC: 2.08 UIU/ML (ref 0.36–3.74)

## 2018-04-03 PROCEDURE — 84439 ASSAY OF FREE THYROXINE: CPT | Performed by: FAMILY MEDICINE

## 2018-04-03 PROCEDURE — 84443 ASSAY THYROID STIM HORMONE: CPT | Performed by: FAMILY MEDICINE

## 2018-04-03 PROCEDURE — 84481 FREE ASSAY (FT-3): CPT | Performed by: FAMILY MEDICINE

## 2018-04-03 NOTE — PROGRESS NOTES
Chief Complaint   Patient presents with    Follow-up     discuss results      1. Have you been to the ER, urgent care clinic since your last visit? Hospitalized since your last visit? No    2. Have you seen or consulted any other health care providers outside of the [address] [place] [address] since your last visit? Include any pap smears or colon screening. No     HPI  Angel Leon comes in for follow-up care. Thyroid nodules: Patient had carotid ultrasound done that showed incidental thyroid nodules. She comes in for follow-up of this. No previous history of thyroid problems but she does have a family history of thyroid disorder. I will check thyroid labs. Given she does have the nodules we will also refer to the ENT physician for evaluation and advice on further workup of the nodules. Weight concern: Patient has obesity. Her BMI 36.14. She has tried to lose weight but this has not occurred. She has done lifestyle and dietary modification to no avail. She also just feels tired and fatigued throughout. We discussed various ways of weight loss. Patient does not want to have any surgery done. She does not want any medication at the moment. She will continue with lifestyle and dietary modification. She wonders whether there is something medical that is causing her to gain weight. We will run labs and I will let her know if these are abnormal.  Chronic fatigue: Patient has history of chronic fatigue. This has been ongoing to date. She has seen various providers for the fatigue and malaise. Was told he had chronic fatigue syndrome. She is concerned that a lot of her symptoms have not had a diagnosis yet. Was told she had lupus but then this also improved. She is thus concerned. He has had a history of mononucleosis. We did do labs that were negative for acute EBV infection but did show immunity thus indicating past infection. I did discuss this with her.   Knee pain: Patient has bilateral knee pain but more so on the left. Patient is on Mobic daily. She also takes vitamin D and vitamin B12. She has arthritis in the left knee. Has been seen by the orthopedist.  Has had steroid injections but this did not help. Plans to have intralesional gel injections. Suspect she will be given Synvisc. She is not sure whether this will help. I have asked her to talk to the orthopedist about possible outcomes of the injection  If she still has concerns about it. Headaches: Patient has been seen by neurology. Was sent for MRI of the brain. This is unremarkable. I did let her know of the results. She will follow-up with a neurologist on this. Past Medical History  Past Medical History:   Diagnosis Date    Depression        Surgical History  Past Surgical History:   Procedure Laterality Date    HX KNEE ARTHROSCOPY      KNEE SCOPE, ALLOGRAFT IMPANT          Medications  Current Outpatient Prescriptions   Medication Sig Dispense Refill    cholecalciferol (VITAMIN D3) 1,000 unit tablet Take  by mouth daily.  ascorbic acid, vitamin C, (VITAMIN C) 500 mg tablet Take  by mouth.  cyanocobalamin (VITAMIN B-12) 1,000 mcg tablet Take 1,000 mcg by mouth daily.  ZOLMitriptan (ZOMIG-ZMT) 5 mg disintegrating tablet One tab at HA onset, may repeat x1 > 2 hours if needed. Max 10 mg/24 hours 9 Tab 2    meloxicam (MOBIC) 15 mg tablet Take 1 Tab by mouth daily (with breakfast). 30 Tab 1       Allergies  Allergies   Allergen Reactions    Augmentin [Amoxicillin-Pot Clavulanate] Nausea and Vomiting       Family History  Family History   Problem Relation Age of Onset    Cancer Mother     Cancer Father     No Known Problems Brother        Social History  Social History     Social History    Marital status:      Spouse name: N/A    Number of children: N/A    Years of education: N/A     Occupational History    Not on file.      Social History Main Topics    Smoking status: Never Smoker    Smokeless tobacco: Never Used    Alcohol use No    Drug use: No    Sexual activity: Not on file     Other Topics Concern    Not on file     Social History Narrative       Review of Systems  Review of Systems - History obtained from chart review and the patient  General ROS: positive for  - fatigue, malaise and weight gain  Psychological ROS: negative  Ophthalmic ROS: positive for - uses glasses  ENT ROS: negative  Allergy and Immunology ROS: negative  Hematological and Lymphatic ROS: negative  Endocrine ROS: negative for - hair pattern changes, polydipsia/polyuria or temperature intolerance  Respiratory ROS: no cough, shortness of breath, or wheezing  Cardiovascular ROS: negative  Gastrointestinal ROS: no abdominal pain, change in bowel habits, or black or bloody stools  Genito-Urinary ROS: no dysuria, trouble voiding, or hematuria  Musculoskeletal ROS: positive for - joint pain, joint stiffness and pain in knee - bilateral  Neurological ROS: no TIA or stroke symptoms    Vital Signs  Visit Vitals    /88 (BP 1 Location: Left arm, BP Patient Position: Sitting)    Pulse 68    Temp 97.3 °F (36.3 °C) (Oral)    Resp 18    Ht 5' 3\" (1.6 m)    Wt 204 lb (92.5 kg)    SpO2 96%    BMI 36.14 kg/m2         Physical Exam  Physical Examination: General appearance - oriented to person, place, and time, overweight and acyanotic, in no respiratory distress  Mental status - alert, oriented to person, place, and time, affect appropriate to mood  Eyes - sclera anicteric  Ears - hearing grossly normal bilaterally  Neck - supple, no significant adenopathy  Lymphatics - no palpable lymphadenopathy  Chest - no tachypnea, retractions or cyanosis  Heart - S1 and S2 normal  Neurological - motor and sensory grossly normal bilaterally  Musculoskeletal - no muscular tenderness noted  Extremities - intact peripheral pulses    Results  Results for orders placed or performed during the hospital encounter of 03/26/18   MONO SCREEN W/ REFLX EBV   Result Value Ref Range    Mononucleosis screen w reflex EBV NEGATIVE  NEG     JW GALEANO VIRUS AB PANEL   Result Value Ref Range    EBV Ab VCA, IgM <36.0 0.0 - 35.9 U/mL    EBV Early Antigen Ab, IgG <9.0 0.0 - 8.9 U/mL    EBV Ab VCA, IgG >600.0 (H) 0.0 - 17.9 U/mL    EBV Nuclear Antigen Ab, IgG 285.0 (H) 0.0 - 17.9 U/mL    Interpretation Comment         ASSESSMENT and PLAN    ICD-10-CM ICD-9-CM    1. Multiple thyroid nodules E04.2 241.1 T4, FREE      TSH 3RD GENERATION      T3, FREE      REFERRAL TO ENT-OTOLARYNGOLOGY      COLLECTION VENOUS BLOOD,VENIPUNCTURE   2. Obesity (BMI 30-39. 9) E66.9 278.00 COLLECTION VENOUS BLOOD,VENIPUNCTURE   3. Chronic pain of both knees M25.561 719.46     M25.562 338.29     G89.29     4. Chronic fatigue disorder R53.82 780.71    5. Nonintractable headache, unspecified chronicity pattern, unspecified headache type R51 784.0      lab results and schedule of future lab studies reviewed with patient  reviewed diet, exercise and weight control  reviewed medications and side effects in detail  radiology results and schedule of future radiology studies reviewed with patient    I have discussed the diagnosis with the patient and the intended plan of care as seen in the above orders. The patient has received an after-visit summary and questions were answered concerning future plans. I have discussed medication, side effects, and warnings with the patient in detail. The patient verbalized understanding and is in agreement with the plan of care. The patient will contact the office with any additional concerns.     Mono Landry MD

## 2018-04-03 NOTE — MR AVS SNAPSHOT
[facility] [address] [facility] 
[phone] Patient: Louise Scherer MRN: [id number] :1965 Visit Information Date & Time Provider Department Dept. Phone Encounter #  
 4/3/2018  2:00 PM Gaviota Griffin MD [facility] [phone] Follow-up Instructions Return in about 1 month (around 5/3/2018), or if symptoms worsen or fail to improve, for thyroid nodule, knee pain. Upcoming Health Maintenance Date Due DTaP/Tdap/Td series (1 - Tdap) 1988 FOBT Q 1 YEAR AGE 50-75 2017 BREAST CANCER SCRN MAMMOGRAM 9/15/2019 PAP AKA CERVICAL CYTOLOGY 2020 Allergies as of 4/3/2018  Review Complete On: 3/12/2018 By: Reather Phalen, NP Severity Noted Reaction Type Reactions Augmentin [Amoxicillin-pot Clavulanate]  2018    Nausea and Vomiting Current Immunizations  Never Reviewed No immunizations on file. Not reviewed this visit You Were Diagnosed With   
  
 Codes Comments Multiple thyroid nodules    -  Primary ICD-10-CM: E04.2 ICD-9-CM: 241.1 Obesity (BMI 30-39. 9)     ICD-10-CM: E66.9 ICD-9-CM: 278.00 Vitals BP Pulse Temp Resp Height(growth percentile) Weight(growth percentile) 137/88 (BP 1 Location: Left arm, BP Patient Position: Sitting) 68 97.3 °F (36.3 °C) (Oral) 18 5' 3\" (1.6 m) 204 lb (92.5 kg) SpO2 BMI OB Status Smoking Status 96% 36.14 kg/m2 Menopause Never Smoker Vitals History BMI and BSA Data Body Mass Index Body Surface Area  
 36.14 kg/m 2 2.03 m 2 Preferred Pharmacy Pharmacy Name Phone [address] [facility], [address] [facility] [phone] Your Updated Medication List  
  
   
This list is accurate as of 4/3/18  2:52 PM.  Always use your most recent med list.  
  
  
  
  
 meloxicam 15 mg tablet Commonly known as:  MOBIC Take 1 Tab by mouth daily (with breakfast). VITAMIN B-12 1,000 mcg tablet Generic drug:  cyanocobalamin Take 1,000 mcg by mouth daily. VITAMIN C 500 mg tablet Generic drug:  ascorbic acid (vitamin C) Take  by mouth. VITAMIN D3 1,000 unit tablet Generic drug:  cholecalciferol Take  by mouth daily. ZOLMitriptan 5 mg disintegrating tablet Commonly known as:  ZOMIG-ZMT One tab at HA onset, may repeat x1 > 2 hours if needed. Max 10 mg/24 hours We Performed the Following REFERRAL TO ENT-OTOLARYNGOLOGY [LVF47 Custom] Follow-up Instructions Return in about 1 month (around 5/3/2018), or if symptoms worsen or fail to improve, for thyroid nodule, knee pain. To-Do List   
 04/03/2018 Lab:  T3, FREE   
  
 04/03/2018 Lab:  T4, FREE   
  
 04/03/2018 Lab:  TSH 3RD GENERATION Referral Information Referral ID Referred By Referred To  
  
 [id number] Reza Bowling N Not Available Visits Status Start Date End Date 1 New Request 4/3/18 4/3/19 If your referral has a status of pending review or denied, additional information will be sent to support the outcome of this decision. Introducing [facility]! [place] introduces FitBionic patient portal. Now you can access parts of your medical record, email your doctor's office, and request medication refills online. 1. In your internet browser, go to [email or web address] 2. Click on the First Time User? Click Here link in the Sign In box. You will see the New Member Sign Up page. 3. Enter your FitBionic Access Code exactly as it appears below. You will not need to use this code after youve completed the sign-up process. If you do not sign up before the expiration date, you must request a new code. · FitBionic Access Code: [email or web address] Expires: 5/27/2018  4:39 PM 
 
 4. Enter the last four digits of your Social Security Number (xxxx) and Date of Birth (mm/dd/yyyy) as indicated and click Submit. You will be taken to the next sign-up page. 5. Create a LibreDigital ID. This will be your LibreDigital login ID and cannot be changed, so think of one that is secure and easy to remember. 6. Create a LibreDigital password. You can change your password at any time. 7. Enter your Password Reset Question and Answer. This can be used at a later time if you forget your password. 8. Enter your e-mail address. You will receive e-mail notification when new information is available in [address] 9. Click Sign Up. You can now view and download portions of your medical record. 10. Click the Download Summary menu link to download a portable copy of your medical information. If you have questions, please visit the Frequently Asked Questions section of the LibreDigital website. Remember, LibreDigital is NOT to be used for urgent needs. For medical emergencies, dial 911. Now available from your iPhone and Android! Please provide this summary of care documentation to your next provider. Your primary care clinician is listed as Gaviota Mendoza. If you have any questions after today's visit, please call [phone].

## 2018-04-04 ENCOUNTER — TELEPHONE (OUTPATIENT)
Dept: FAMILY MEDICINE CLINIC | Age: 53
End: 2018-04-04

## 2018-04-10 NOTE — PROGRESS NOTES
Call patient at this time to inform patient of results at this time. Unable to leave voicemail due to mailbox being full

## 2018-04-17 ENCOUNTER — PATIENT OUTREACH (OUTPATIENT)
Dept: FAMILY MEDICINE CLINIC | Age: 53
End: 2018-04-17

## 2018-05-08 ENCOUNTER — PATIENT OUTREACH (OUTPATIENT)
Dept: FAMILY MEDICINE CLINIC | Age: 53
End: 2018-05-08

## 2018-05-14 ENCOUNTER — PATIENT OUTREACH (OUTPATIENT)
Dept: FAMILY MEDICINE CLINIC | Age: 53
End: 2018-05-14

## 2018-05-14 NOTE — PROGRESS NOTES
NN health screening:    Due to lack of response on patient part I am closing this episode of care. Should Ms Cinda Louise call I will gladly assist with completion of her CRC screening.

## 2018-05-23 ENCOUNTER — PATIENT OUTREACH (OUTPATIENT)
Dept: FAMILY MEDICINE CLINIC | Age: 53
End: 2018-05-23

## 2018-06-07 ENCOUNTER — OFFICE VISIT (OUTPATIENT)
Dept: ORTHOPEDIC SURGERY | Age: 53
End: 2018-06-07

## 2018-06-07 VITALS
WEIGHT: 200.2 LBS | TEMPERATURE: 98.1 F | RESPIRATION RATE: 16 BRPM | OXYGEN SATURATION: 97 % | SYSTOLIC BLOOD PRESSURE: 133 MMHG | HEART RATE: 86 BPM | BODY MASS INDEX: 35.47 KG/M2 | HEIGHT: 63 IN | DIASTOLIC BLOOD PRESSURE: 72 MMHG

## 2018-06-07 DIAGNOSIS — M70.61 TROCHANTERIC BURSITIS OF RIGHT HIP: Primary | ICD-10-CM

## 2018-06-07 PROBLEM — E66.01 SEVERE OBESITY (BMI 35.0-39.9): Status: ACTIVE | Noted: 2018-06-07

## 2018-06-07 NOTE — MR AVS SNAPSHOT
[address], [address] [facility] 
[phone] Patient: Jose Tadeo MRN: [id number] :1965 Visit Information Date & Time Provider Department Dept. Phone Encounter #  
 2018  2:30 PM Zaria Linares  [facility], [address] and [facility] [phone] [id number] Your Appointments 2018  7:50 AM  
New Patient with Nicola Del Valle MD  
[address] [facility], [address] and [facility] - [facility] ([address]) Appt Note: left ankle pain, nx bcbs [facility] [address] [facility]  
[phone] 23[address] [facility] [address] Upcoming Health Maintenance Date Due DTaP/Tdap/Td series (1 - Tdap) 1988 FOBT Q 1 YEAR AGE 50-75 2017 Influenza Age 5 to Adult 2018 BREAST CANCER SCRN MAMMOGRAM 9/15/2019 PAP AKA CERVICAL CYTOLOGY 2020 Allergies as of 2018  Review Complete On: 2018 By: Yung Pi Severity Noted Reaction Type Reactions Augmentin [Amoxicillin-pot Clavulanate]  2018    Nausea and Vomiting Current Immunizations  Never Reviewed No immunizations on file. Not reviewed this visit You Were Diagnosed With   
  
 Codes Comments Trochanteric bursitis of right hip    -  Primary ICD-10-CM: M70.61 ICD-9-CM: 726.5 Vitals BP Pulse Temp Resp Height(growth percentile) Weight(growth percentile) 133/72 86 98.1 °F (36.7 °C) (Oral) 16 5' 3\" (1.6 m) 200 lb 3.2 oz (90.8 kg) LMP SpO2 BMI OB Status Smoking Status 2017 97% 35.46 kg/m2 Menopause Never Smoker BMI and BSA Data Body Mass Index Body Surface Area  
 35.46 kg/m 2 2.01 m 2 Preferred Pharmacy Pharmacy Name Phone 417 [facility], [address] [facility] [phone] Your Updated Medication List  
  
 This list is accurate as of 6/7/18  3:12 PM.  Always use your most recent med list.  
  
  
  
  
 meloxicam 15 mg tablet Commonly known as:  MOBIC Take 1 Tab by mouth daily (with breakfast). VITAMIN B-12 1,000 mcg tablet Generic drug:  cyanocobalamin Take 1,000 mcg by mouth daily. VITAMIN C 500 mg tablet Generic drug:  ascorbic acid (vitamin C) Take  by mouth. VITAMIN D3 1,000 unit tablet Generic drug:  cholecalciferol Take  by mouth daily. ZOLMitriptan 5 mg disintegrating tablet Commonly known as:  ZOMIG-ZMT One tab at HA onset, may repeat x1 > 2 hours if needed. Max 10 mg/24 hours We Performed the Following REFERRAL TO PHYSICAL THERAPY [NGM30 Custom] Comments:  
 Evaluate and treat modalities as necessary Referral Information Referral ID Referred By Referred To  
  
 [id number] Hayley Ortiz [address]   
   [address] [facility] [address] Phone: [phone] Fax: [phone] Visits Status Start Date End Date 1 New Request 6/7/18 6/7/19 If your referral has a status of pending review or denied, additional information will be sent to support the outcome of this decision. Introducing [facility]! [employer] introduces [employer]aast patient portal. Now you can access parts of your medical record, email your doctor's office, and request medication refills online. 1. In your internet browser, go to [email or web address] 2. Click on the First Time User? Click Here link in the Sign In box. You will see the New Member Sign Up page. 3. Enter your Blaast Access Code exactly as it appears below. You will not need to use this code after youve completed the sign-up process. If you do not sign up before the expiration date, you must request a new code. · Blaast Access Code: [email or web address] Expires: 9/5/2018  3:10 PM 
 
 4. Enter the last four digits of your Social Security Number (xxxx) and Date of Birth (mm/dd/yyyy) as indicated and click Submit. You will be taken to the next sign-up page. 5. Create a MobilePeak ID. This will be your MobilePeak login ID and cannot be changed, so think of one that is secure and easy to remember. 6. Create a MobilePeak password. You can change your password at any time. 7. Enter your Password Reset Question and Answer. This can be used at a later time if you forget your password. 8. Enter your e-mail address. You will receive e-mail notification when new information is available in [address] 9. Click Sign Up. You can now view and download portions of your medical record. 10. Click the Download Summary menu link to download a portable copy of your medical information. If you have questions, please visit the Frequently Asked Questions section of the MobilePeak website. Remember, MobilePeak is NOT to be used for urgent needs. For medical emergencies, dial 911. Now available from your iPhone and Android! Please provide this summary of care documentation to your next provider. Your primary care clinician is listed as Gaviota Mendoza. If you have any questions after today's visit, please call [phone].

## 2018-06-07 NOTE — PROGRESS NOTES
HISTORY OF PRESENT ILLNESS:  Rocío Monson is here with regards to her right hip. I saw her last January and she was doing very well after a cortisone shot to her right greater trochanter but just recently she has had to stop her anti-inflammatory medications as this has aggravated her migraines. Then she noticed some recurrent pain along the lateral aspect of the right hip. She has been trying to lose weight but has been unsuccessful. She points to pain right over the right greater trochanter. She denies pain in the right groin. She is not utilizing ambulatory assist.  She is not on any medications now. PHYSICAL EXAMINATION:  Clinical examination reveals a significantly overweight 50year old  female in minimal discomfort. She moves easily on and off the examination table. In reference to her right hip, she has an excellent active and passive range of motion of the right hip without discomfort. Right hip roll test is negative. Leg length symmetrical in the supine position. She has palpable tenderness in the right greater trochanter. Her pain is slightly aggravated with resisted abduction of the right hip. Neurovascular right lower extremity proximally and distally, normal motor strength and sensation. IMPRESSION:  Right greater trochanteric bursitis. RECOMMENDATIONS:  She is not able to take anti-inflammatory medication at this time. The importance of weight reduction were discussed with the patient. I have recommended a course of therapy for modalities, possible ultrasound and topical cortisone. All of her questions were answered today. She will return to see me on a p.r.n. basis or sooner if her conditions worsen. In the meantime, consideration is made for a cortisone shot.                   Vitals:    06/07/18 1437   BP: 133/72   Pulse: 86   Resp: 16   Temp: 98.1 °F (36.7 °C)   TempSrc: Oral   SpO2: 97%   Weight: 200 lb 3.2 oz (90.8 kg)   Height: 5' 3\" (1.6 m) PainSc:   0 - No pain   LMP: 03/18/2017       Patient Active Problem List   Diagnosis Code    Recurrent depression ([facility] F33.9    Severe obesity (BMI 35.0-39.9) ([facility] E66.01     Patient Active Problem List    Diagnosis Date Noted    Severe obesity (BMI 35.0-39.9) ([facility] 06/07/2018    Recurrent depression ([facility] 01/18/2018     Current Outpatient Prescriptions   Medication Sig Dispense Refill    cholecalciferol (VITAMIN D3) 1,000 unit tablet Take  by mouth daily.  ascorbic acid, vitamin C, (VITAMIN C) 500 mg tablet Take  by mouth.  cyanocobalamin (VITAMIN B-12) 1,000 mcg tablet Take 1,000 mcg by mouth daily.  ZOLMitriptan (ZOMIG-ZMT) 5 mg disintegrating tablet One tab at HA onset, may repeat x1 > 2 hours if needed. Max 10 mg/24 hours 9 Tab 2    meloxicam (MOBIC) 15 mg tablet Take 1 Tab by mouth daily (with breakfast).  30 Tab 1     Allergies   Allergen Reactions    Augmentin [Amoxicillin-Pot Clavulanate] Nausea and Vomiting     Past Medical History:   Diagnosis Date    Depression      Past Surgical History:   Procedure Laterality Date    HX KNEE ARTHROSCOPY      KNEE SCOPE, ALLOGRAFT IMPANT       Family History   Problem Relation Age of Onset    Cancer Mother     Cancer Father     No Known Problems Brother      Social History   Substance Use Topics    Smoking status: Never Smoker    Smokeless tobacco: Never Used    Alcohol use No

## 2018-06-18 ENCOUNTER — PATIENT OUTREACH (OUTPATIENT)
Dept: FAMILY MEDICINE CLINIC | Age: 53
End: 2018-06-18

## 2018-07-02 ENCOUNTER — OFFICE VISIT (OUTPATIENT)
Dept: FAMILY MEDICINE CLINIC | Age: 53
End: 2018-07-02

## 2018-07-02 ENCOUNTER — HOSPITAL ENCOUNTER (OUTPATIENT)
Dept: LAB | Age: 53
Discharge: HOME | End: 2018-07-02
Payer: COMMERCIAL

## 2018-07-02 VITALS
HEART RATE: 76 BPM | WEIGHT: 196 LBS | DIASTOLIC BLOOD PRESSURE: 75 MMHG | TEMPERATURE: 97.9 F | HEIGHT: 63 IN | SYSTOLIC BLOOD PRESSURE: 132 MMHG | RESPIRATION RATE: 18 BRPM | BODY MASS INDEX: 34.73 KG/M2 | OXYGEN SATURATION: 98 %

## 2018-07-02 DIAGNOSIS — M79.672 LEFT FOOT PAIN: ICD-10-CM

## 2018-07-02 DIAGNOSIS — Z01.818 PREOP EXAMINATION: ICD-10-CM

## 2018-07-02 DIAGNOSIS — Z01.818 PREOP EXAMINATION: Primary | ICD-10-CM

## 2018-07-02 DIAGNOSIS — M76.822 POSTERIOR TIBIAL TENDON DYSFUNCTION (PTTD) OF LEFT LOWER EXTREMITY: ICD-10-CM

## 2018-07-02 LAB
ANION GAP SERPL CALC-SCNC: 6 MMOL/L (ref 3–18)
BUN SERPL-MCNC: 15 MG/DL (ref 7–18)
BUN/CREAT SERPL: 15 (ref 12–20)
CALCIUM SERPL-MCNC: 9.6 MG/DL (ref 8.5–10.1)
CHLORIDE SERPL-SCNC: 103 MMOL/L (ref 100–108)
CO2 SERPL-SCNC: 31 MMOL/L (ref 21–32)
CREAT SERPL-MCNC: 0.97 MG/DL (ref 0.6–1.3)
ERYTHROCYTE [DISTWIDTH] IN BLOOD BY AUTOMATED COUNT: 12.9 % (ref 11.6–14.5)
GLUCOSE SERPL-MCNC: 81 MG/DL (ref 74–99)
HCT VFR BLD AUTO: 44.2 % (ref 35–45)
HGB BLD-MCNC: 14.3 G/DL (ref 12–16)
MCH RBC QN AUTO: 33.7 PG (ref 24–34)
MCHC RBC AUTO-ENTMCNC: 32.4 G/DL (ref 31–37)
MCV RBC AUTO: 104.2 FL (ref 74–97)
PLATELET # BLD AUTO: 325 K/UL (ref 135–420)
PMV BLD AUTO: 10.5 FL (ref 9.2–11.8)
POTASSIUM SERPL-SCNC: 4.2 MMOL/L (ref 3.5–5.5)
RBC # BLD AUTO: 4.24 M/UL (ref 4.2–5.3)
SODIUM SERPL-SCNC: 140 MMOL/L (ref 136–145)
WBC # BLD AUTO: 8.8 K/UL (ref 4.6–13.2)

## 2018-07-02 PROCEDURE — 85027 COMPLETE CBC AUTOMATED: CPT | Performed by: FAMILY MEDICINE

## 2018-07-02 PROCEDURE — 80048 BASIC METABOLIC PNL TOTAL CA: CPT | Performed by: FAMILY MEDICINE

## 2018-07-02 NOTE — MR AVS SNAPSHOT
[facility] [address] [facility] [address] 
[phone] Patient: Ted Mariee MRN: [id number] :1965 Visit Information Date & Time Provider Department Dept. Phone Encounter #  
 2018  2:30 PM Gaviota Rosario Ma, MD [facility] [phone] [id number] Follow-up Instructions Return if symptoms worsen or fail to improve. Your Appointments 2018 10:30 AM  
Follow Up with Mago Mcdonald MD  
[address] [facility], [address] [facility] ([facility]) Appt Note: RT HIP 3 mo fu  
 [place], [address] [facility] [address]  
[phone] [address] [facility], [facility] [address] Upcoming Health Maintenance Date Due DTaP/Tdap/Td series (1 - Tdap) 1988 FOBT Q 1 YEAR AGE 50-75 2017 Influenza Age 5 to Adult 2018 BREAST CANCER SCRN MAMMOGRAM 9/15/2019 PAP AKA CERVICAL CYTOLOGY 2020 Allergies as of 2018  Review Complete On: 2018 By: Carmen Giordano MD  
  
 Severity Noted Reaction Type Reactions Augmentin [Amoxicillin-pot Clavulanate]  2018    Nausea and Vomiting Current Immunizations  Never Reviewed No immunizations on file. Not reviewed this visit You Were Diagnosed With   
  
 Codes Comments Preop examination    -  Primary ICD-10-CM: T91.219 ICD-9-CM: V72.84 Left foot pain     ICD-10-CM: O88.610 ICD-9-CM: 729.5 Posterior tibial tendon dysfunction (PTTD) of left lower extremity     ICD-10-CM: W86.400 ICD-9-CM: 867 Vitals BP Pulse Temp Resp Height(growth percentile) Weight(growth percentile) 132/75 (BP 1 Location: Left arm, BP Patient Position: Sitting) 76 97.9 °F (36.6 °C) (Oral) 18 5' 3\" (1.6 m) 196 lb (88.9 kg) SpO2 BMI OB Status Smoking Status 98% 34.72 kg/m2 Menopause Never Smoker BMI and BSA Data Body Mass Index Body Surface Area 34.72 kg/m 2 1.99 m 2 Preferred Pharmacy Pharmacy Name Phone [address] [facility], [address] [place] [address] [phone] Your Updated Medication List  
  
   
This list is accurate as of 7/2/18  3:09 PM.  Always use your most recent med list.  
  
  
  
  
 VITAMIN B-12 1,000 mcg tablet Generic drug:  cyanocobalamin Take 1,000 mcg by mouth daily. VITAMIN C 500 mg tablet Generic drug:  ascorbic acid (vitamin C) Take  by mouth. VITAMIN D3 1,000 unit tablet Generic drug:  cholecalciferol Take  by mouth daily. ZOLMitriptan 5 mg disintegrating tablet Commonly known as:  ZOMIG-ZMT One tab at HA onset, may repeat x1 > 2 hours if needed. Max 10 mg/24 hours Follow-up Instructions Return if symptoms worsen or fail to improve. To-Do List   
 07/02/2018 Lab:  CBC W/O DIFF   
  
 07/02/2018 Lab:  METABOLIC PANEL, BASIC   
  
 07/06/2018 2:00 PM  
  Appointment with Juan Miguel Kamara PT at [facility] PT Heydi BURGESS ([phone]) Introducing [facility]! [facility] introduces [address] patient portal. Now you can access parts of your medical record, email your doctor's office, and request medication refills online. 1. In your internet browser, go to [email or web address] 2. Click on the First Time User? Click Here link in the Sign In box. You will see the New Member Sign Up page. 3. Enter your [address] Access Code exactly as it appears below. You will not need to use this code after youve completed the sign-up process. If you do not sign up before the expiration date, you must request a new code. · [address] Access Code: [email or web address] Expires: 9/5/2018  3:10 PM 
 
4. Enter the last four digits of your Social Security Number (xxxx) and Date of Birth (mm/dd/yyyy) as indicated and click Submit.  You will be taken to the next sign-up page. 5. Create a Wi3 ID. This will be your Wi3 login ID and cannot be changed, so think of one that is secure and easy to remember. 6. Create a Wi3 password. You can change your password at any time. 7. Enter your Password Reset Question and Answer. This can be used at a later time if you forget your password. 8. Enter your e-mail address. You will receive e-mail notification when new information is available in [address]. 9. Click Sign Up. You can now view and download portions of your medical record. 10. Click the Download Summary menu link to download a portable copy of your medical information. If you have questions, please visit the Frequently Asked Questions section of the Wi3 website. Remember, Wi3 is NOT to be used for urgent needs. For medical emergencies, dial 911. Now available from your iPhone and Android! Please provide this summary of care documentation to your next provider. Your primary care clinician is listed as Gaviota Mendoza. If you have any questions after today's visit, please call [phone].

## 2018-07-02 NOTE — PROGRESS NOTES
Chief Complaint   Patient presents with    Pre-op Exam     left ankle     1. Have you been to the ER, urgent care clinic since your last visit? Hospitalized since your last visit? No    2. Have you seen or consulted any other health care providers outside of the [address] [place] [address] since your last visit? Include any pap smears or colon screening. No     VERONICA Maldonado comes in for preop evaluation. Planned procedure: Gastrocnemius recession, calcaneal osteotomy, repair of posterior tibial tendon with flexor transfer and all indicated procedures. Referring physician:  Caden Bowden podiatrist  Place of procedure: [facility]  Date of procedure: 07/17/2018  Indication for procedure: Left foot pain. Patient has a history of left foot pain. She was seen by the podiatrist.  Pain had been ongoing for months. Pt describes pain as sharp and stabbing in nature. Patient  has tried rest, ice, nsaids to relieve the symptoms with no relief. Symptoms are worst with ambulation, standing, direct pressure. Related hearing popping along the medial aspect of foot and ankle. Denies hx of trauma. Patient was seen by the podiatrist and has had an MRI done that showed:  1.  Partial tear of the posterior tibial tendon at the tip of the medial malleolus, with associated tenosynovitis. 2.  Old ATF ligament sprain/partial tear. 3.  Cystic changes in the anterior body of the calcaneus appear to be related to traction enthesopathy produced by the extensor retinaculum but are of questionable clinical significance. 4.  Flexor hallucis longus tenosynovitis without tendinopathy. 5.  Small calcaneocuboid joint effusion. 6.  Lateral ankle gutter osteoarthrosis    She is now scheduled for the above procedure.     Past Medical History  Past Medical History:   Diagnosis Date    Depression        Surgical History  Past Surgical History:   Procedure Laterality Date    HX KNEE ARTHROSCOPY      KNEE SCOPE, ALLOGRAFT IMPANT          Medications  Current Outpatient Prescriptions   Medication Sig Dispense Refill    cholecalciferol (VITAMIN D3) 1,000 unit tablet Take  by mouth daily.  ascorbic acid, vitamin C, (VITAMIN C) 500 mg tablet Take  by mouth.  cyanocobalamin (VITAMIN B-12) 1,000 mcg tablet Take 1,000 mcg by mouth daily.  ZOLMitriptan (ZOMIG-ZMT) 5 mg disintegrating tablet One tab at HA onset, may repeat x1 > 2 hours if needed. Max 10 mg/24 hours 9 Tab 2    meloxicam (MOBIC) 15 mg tablet Take 1 Tab by mouth daily (with breakfast). 30 Tab 1       Allergies  Allergies   Allergen Reactions    Augmentin [Amoxicillin-Pot Clavulanate] Nausea and Vomiting       Family History  Family History   Problem Relation Age of Onset    Cancer Mother     Cancer Father     No Known Problems Brother        Social History  Social History     Social History    Marital status:      Spouse name: N/A    Number of children: N/A    Years of education: N/A     Occupational History    Not on file.      Social History Main Topics    Smoking status: Never Smoker    Smokeless tobacco: Never Used    Alcohol use No    Drug use: No    Sexual activity: Not on file     Other Topics Concern    Not on file     Social History Narrative       Review of Systems  Review of Systems - History obtained from chart review and the patient  General ROS: negative for - chills, fatigue, fever or malaise  Psychological ROS: negative  Ophthalmic ROS: negative  ENT ROS: negative  Allergy and Immunology ROS: negative  Hematological and Lymphatic ROS: negative  Endocrine ROS: negative  Respiratory ROS: no cough, shortness of breath, or wheezing  Cardiovascular ROS: no chest pain or dyspnea on exertion  Gastrointestinal ROS: no abdominal pain, change in bowel habits, or black or bloody stools  Genito-Urinary ROS: no dysuria, trouble voiding, or hematuria  Musculoskeletal ROS: positive for -left foot pain  Neurological ROS: no TIA or stroke symptoms  Dermatological ROS: negative    Vital Signs  Visit Vitals    /75 (BP 1 Location: Left arm, BP Patient Position: Sitting)    Pulse 76    Temp 97.9 °F (36.6 °C) (Oral)    Resp 18    Ht 5' 3\" (1.6 m)    Wt 196 lb (88.9 kg)    SpO2 98%    BMI 34.72 kg/m2         Physical Exam  Physical Examination: General appearance - alert, well appearing, and in no distress, oriented to person, place, and time and acyanotic, in no respiratory distress  Mental status - alert, oriented to person, place, and time, normal mood, behavior, speech, dress, motor activity, and thought processes  Neck - supple, no significant adenopathy  Lymphatics - no palpable lymphadenopathy, no hepatosplenomegaly  Chest - clear to auscultation, no wheezes, rales or rhonchi, symmetric air entry  Heart - normal rate, regular rhythm, normal S1, S2, no murmurs, rubs, clicks or gallops  Neurological - alert, oriented, normal speech, no focal findings or movement disorder noted  Musculoskeletal -patient has tenderness left foot at the medial malleolus with swelling. Has discomfort on dorsi and plantarflexion. Extremities - intact peripheral pulses    Results  Results for orders placed or performed during the hospital encounter of 04/03/18   T4, FREE   Result Value Ref Range    T4, Free 1.1 0.7 - 1.5 NG/DL   TSH 3RD GENERATION   Result Value Ref Range    TSH 2.08 0.36 - 3.74 uIU/mL   T3, FREE   Result Value Ref Range    Triiodothyronine (T3), free 3.3 2.18 - 3.98 PG/ML       ASSESSMENT and PLAN    ICD-10-CM ICD-9-CM    1. Preop examination T81.689 C61.36 METABOLIC PANEL, BASIC      CBC W/O DIFF      COLLECTION VENOUS BLOOD,VENIPUNCTURE   2. Left foot pain V05.389 257.5 METABOLIC PANEL, BASIC      CBC W/O DIFF      COLLECTION VENOUS BLOOD,VENIPUNCTURE   3.  Posterior tibial tendon dysfunction (PTTD) of left lower extremity O65.821 817 METABOLIC PANEL, BASIC      CBC W/O DIFF      COLLECTION VENOUS BLOOD,VENIPUNCTURE     lab results and schedule of future lab studies reviewed with patient  reviewed diet, exercise and weight control  reviewed medications and side effects in detail  Patient physical exam is stable. She will get her CBC and a BMP done. If these are stable then she will be cleared to proceed with planned procedure. I have discussed the diagnosis with the patient and the intended plan of care as seen in the above orders. The patient has received an after-visit summary and questions were answered concerning future plans. I have discussed medication, side effects, and warnings with the patient in detail. The patient verbalized understanding and is in agreement with the plan of care. The patient will contact the office with any additional concerns.     Nick Lorenzana MD

## 2018-07-06 ENCOUNTER — HOSPITAL ENCOUNTER (OUTPATIENT)
Dept: PHYSICAL THERAPY | Age: 53
Discharge: HOME | End: 2018-07-06
Payer: COMMERCIAL

## 2018-07-06 PROCEDURE — 97110 THERAPEUTIC EXERCISES: CPT

## 2018-07-06 PROCEDURE — 97140 MANUAL THERAPY 1/> REGIONS: CPT

## 2018-07-06 PROCEDURE — 97162 PT EVAL MOD COMPLEX 30 MIN: CPT

## 2018-07-06 NOTE — PROGRESS NOTES
PT DAILY TREATMENT NOTE - [facility]     Patient Name: Lavelle Valencia  Date:2018  : 1965  [x]  Patient  Verified  Payor: BLUE [employer]AKARI / Plan: Carmina Juarez [id number] PPO / Product Type: PPO /    In time:203  Out time:253  Total Treatment Time (min): 50  Total Timed Codes (min): 23  Therapist Time: 50   Visit #: 1 of 12    Treatment Area: Trochanteric bursitis, right hip [M70.61]    Physical Therapy Evaluation - Hip    SUBJECTIVE      Any medication changes, allergies to medications, adverse drug reactions, diagnosis change, or new procedure performed?: [x] No    [] Yes (see summary sheet for update)    Subjective functional status/changes:     PLOF: (I) Ambulation, (I) Work-related ADLs without modification, (I) Functional ADLs, Does not workout regularly, No sleep disturbances  Current Functional Status: Limitations with work-related ADLs (i.e. 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 SLS: Left 24 sec / Right 21 sec (burning in right lateral hip)  2.  Bilateral Squat: Symmetrical weightbearing bilaterally, Reproduction of right lateral hip burn    Lumbar Screen: No reproduction of symptoms with lumbar AROM with all AROM WNL  Neurological Screen: 2+ L Patellar, 2+ R Patellar, 2+ R Achilles, Intact, symmetrical sensation to light touch L2-S2   - (-) Right Supine SLR             ROM/Strength    AROM MMT (1-5)  Hip Left Right Left Right   Flexion  90 (4/10 p!) 5 5 (p!)   Extension   5 4+   Abduction   5 4   ER   5 4 (p!)   IR (seated) 32 35 (p!) 5 3 (p!)   Knee Left Right Left Right   Extension   5 5   Flexion   5 5     Flexibility: [] Unable to assess at this time  Hamstrings (Supine - inclinometer):    (R) Tightness= Minimal   Quadriceps (Prone):    (L) Tightness=  Minimal   (R) Tightness= Minimal                                  Palpation: Right Piriformis and Gluteus medius/minimus    Special Tests  Gerard/ Candace Test: [] Neg    [x] Pos  Scouring Test:  [] Neg    [x] Pos (anterolateral rim)  Trendelenberg:  [] Neg    [x] Pos [] Lef    [x] Rightt  FADDIRTest:   [] Neg    [x] Pos    OBJECTIVE    27 min [x]Eval                  []Re-Eval     8 min Therapeutic Exercise:  [x] See flow sheet : Patient educated regarding completion of prescribed HEP and provided with written HEP instructions, Patient educated regarding physical therapy PoC   Rationale: increase ROM and increase strength to improve the patients ability to improve ease with stair management    5 min Therapeutic Activity:  [x]  See flow sheet : Patient educated regarding modifications of ADLs to improve tolerance, decrease pain and improving ease with functional ADLs   Rationale: increase ROM and increase strength  to improve the patients ability to improve ease with household ADLs     10 min Manual Therapy:    Prone, Right Hip IR PROM with Hip ER Tack and Stretch  Prone, Right Hip IR Grade I-II Mobilizations  Supine, Right Hip AP Grade III-IV Mobilization (45 deg flexion,  IR, Add)  Supine, Right Hip IR 90/90 Antagonist Contract-Relax  Supine, Right Piriformis Contract-Relax >90 degrees   Rationale: decrease pain, increase ROM and increase tissue extensibility to improve ease with stair management          With   [] TE   [] TA   [] neuro   [] other: Patient Education: [x] Review HEP    [] Progressed/Changed HEP based on:   [] positioning   [] body mechanics   [] transfers   [] heat/ice application    [] other:      Pain Level (0-10 scale) post treatment: 0    ASSESSMENT/Changes in Function: Patient presents with s/s consistent with right gluteal tendinopathy with patient reporting onset Fall 2018 without known ALLISON with 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 Patient reports receival of right hip greater trochanter steroid injection 1/2018 with significant improvement in symptoms x30 days. Patient reports pain localized to right lateral hip without radiation with patient denying N/T nor LE weakness. Increase in pain reported with stair ascent, right sidelying and crossing her legs with reduction with unweighting and rest. Patient currently off of work secondary to upcoming left foot surgery scheduled 7/19/2018 with patient unsure regarding specifics of procedure. Patient as well with PMH significant for left knee arthroscopy (date unknown) with chronic swelling reported. Patient reports associated swelling with right lateral hip pain. Patient demonstrates a right antalgic gait pattern with ambulation with left lateral trunk lean throughout gait and diminished right hip extension during terminal stance phase. Patient demonstrates a normal lumbar screen with a normal neurological screen and (-) right supine SLR. Patient demonstrates increase in \"burning\" sensation along the right lateral hip with SLS and bilateral squat. Patient demonstrates TTP to the right piriformis and gluteus minimus/medius with patient noted with reproduction of pain with hip IR and flexion AROM demonstrating non-specific painful weakness with assessment of the hip musculature. Emphasis of within clinic treatment to be placed on improving balance of hip girdle musculature with gradual progression to completion of weightbearing exercise to improve ease with work-related ADLs. Secondary to upcoming surgery on left foot scheduled 7/19/2018 patient to be seen x2 weeks with placement on hold at this time with determination regarding continuation based on surgeon recommendation. Patient will continue to benefit from skilled PT services to modify and progress therapeutic interventions, address functional mobility deficits, address ROM deficits and address strength deficits to attain remaining goals. [x]  See Plan of Care  []  See progress note/recertification  []  See Discharge Summary         Progress towards goals / Updated goals:     Short Term Goals: To be accomplished in 2-3 weeks:  1. Patient will subjectively report full compliance with prescribed HEP. Eval: HEP provided  2. Patent will demonstrate right hip IR/ER MMT 5/5 to improve ease with ambulation on uneven ground. Eval: Right Hip IR 3/5 (p!), Right Hip IR 4/5 (p!)  3. Patient will demonstrate supine right hip flexion AROM >/=100  degrees without pain to improve ease with curb management. Eval: Right Hip Flexion = 90 degrees (p! 4/10)     Long Term Goals: To be accomplished in 4 weeks:  1. Patient will demonstrate a significant functional improvement as demonstrated by a score of >/=69 on FOTO. Eval: FOTO = 58  2. Patient will demonstrate ability to ascend/descend 40 stairs with a step-over-step gait pattern with no more than 1 UE assist and no right hip pain to improve ease with work-related ADLs. Eval: NT  3. Patient will demonstrate right SLS >/=30 seconds without reproduction of right lateral hip burning to improve ease with ambulation on uneven ground.   Eval: Right SLS = 21 sec (right lateral hip \"burning\")      PLAN  [x]  Upgrade activities as tolerated     []  Continue plan of care  []  Update interventions per flow sheet       []  Discharge due to:_  []  Other:_      Kev Mosley PT 7/6/2018  7:00 AM    Future Appointments  Date Time Provider Feliberto Muñiz   7/6/2018 2:00 PM Kev Mosley PT [id number] 9/7/2018 10:30  [facility],[address], MD [address]

## 2018-07-06 NOTE — PROGRESS NOTES
[facility] - [facility]              [address]        Andriy mcgrath, [address]   [phone] fax    Plan of Care/ Statement of Necessity for Physical Therapy Services  Patient name: Raiza Baron Start of Care: 2018   Referral source: Candice Hernandez MD : 1965    Medical Diagnosis: Trochanteric bursitis, right hip [M70.61]   Onset Date:2017    Treatment Diagnosis: Chronic Right Gluteal Tendinopathy   Prior Hospitalization: see medical history Provider#: [id number]   Medications: Verified on Patient summary List    Comorbidities: Arthritis, BMI>30   Prior Level of Function: (I) Ambulation, (I) Work-related ADLs without modification, (I) Functional ADLs, Does not workout regularly, No sleep disturbances     The Plan of Care and following information is based on the information from the initial evaluation. 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 Patient reports receival of right hip greater trochanter steroid injection 2018 with significant improvement in symptoms x30 days. Patient reports pain localized to right lateral hip without radiation with patient denying N/T nor LE weakness. Increase in pain reported with stair ascent, right sidelying and crossing her legs with reduction with unweighting and rest. Patient currently off of work secondary to upcoming left foot surgery scheduled 2018 with patient unsure regarding specifics of procedure. Patient as well with PMH significant for left knee arthroscopy (date unknown) with chronic swelling reported. Patient reports associated swelling with right lateral hip pain.  Patient demonstrates a right antalgic gait pattern with ambulation with left lateral trunk lean throughout gait and diminished right hip extension during terminal stance phase. Patient demonstrates a normal lumbar screen with a normal neurological screen and (-) right supine SLR. Patient demonstrates increase in \"burning\" sensation along the right lateral hip with SLS and bilateral squat. Patient demonstrates TTP to the right piriformis and gluteus minimus/medius with patient noted with reproduction of pain with hip IR and flexion AROM demonstrating non-specific painful weakness with assessment of the hip musculature. Emphasis of within clinic treatment to be placed on improving balance of hip girdle musculature with gradual progression to completion of weightbearing exercise to improve ease with work-related ADLs. Secondary to upcoming surgery on left foot scheduled 7/19/2018 patient to be seen x2 weeks with placement on hold at this time with determination regarding continuation based on surgeon recommendation.      Patient will continue to benefit from skilled PT services to modify and progress therapeutic interventions, address functional mobility deficits, address ROM deficits and address strength deficits to attain remaining goals. Key Information:    BP: 136/84 mmHg  Posture/Observation: Left lateral trunk lean     Gait: Left lateral trunk lean with decreased right hip extension with terminal stance phase     Functional Tests:  1. SLS: Left 24 sec / Right 21 sec (burning in right lateral hip)  2.  Bilateral Squat: Symmetrical weightbearing bilaterally, Reproduction of right lateral hip burn     Lumbar Screen: No reproduction of symptoms with lumbar AROM with all AROM WNL  Neurological Screen: 2+ L Patellar, 2+ R Patellar, 2+ R Achilles, Intact, symmetrical sensation to light touch L2-S2                        - (-) Right Supine SLR                                                     ROM/Strength            AROM                                MMT (1-5)  Hip Left Right Left Right   Flexion   90 (4/10 p!) 5 5 (p!) Extension     5 4+   Abduction     5 4   ER     5 4 (p!)   IR (seated) 32 35 (p!) 5 3 (p!)   Knee Left Right Left Right   Extension     5 5   Flexion     5 5      Flexibility: [] Unable to assess at this time  Hamstrings (Supine - inclinometer):                         (R) Tightness= Minimal   Quadriceps (Prone):                         (L) Tightness= Minimal                        (R) Tightness= Minimal      Palpation: Right Piriformis and Gluteus medius/minimus     Special Tests  Gerard/ Candace Test:              [] Neg    [x] Pos  Scouring Test:                     [] Neg    [x] Pos (anterolateral rim)  Trendelenberg:                     [] Neg    [x] Pos               [] Lef    [x] Rightt  FADDIRTest:                       [] Neg    [x] Pos    Evaluation Complexity History MEDIUM  Complexity : 1-2 comorbidities / personal factors will impact the outcome/ POC ; Examination MEDIUM Complexity : 3 Standardized tests and measures addressing body structure, function, activity limitation and / or participation in recreation  ;Presentation MEDIUM Complexity : Evolving with changing characteristics  ; Clinical Decision Making MEDIUM Complexity : FOTO score of 26-74  Overall Complexity Rating: MEDIUM  Problem List: pain affecting function, decrease ROM, decrease strength, edema affecting function, impaired gait/ balance, decrease ADL/ functional abilitiies, decrease activity tolerance, decrease flexibility/ joint mobility and decrease transfer abilities   Treatment Plan may include any combination of the following: Therapeutic exercise, Therapeutic activities, Neuromuscular re-education, Physical agent/modality, Gait/balance training, Manual therapy, Patient education, Self Care training, Functional mobility training, Home safety training and Stair training  Patient / Family readiness to learn indicated by: asking questions, trying to perform skills and interest  Persons(s) to be included in education: patient (P)  Barriers to Learning/Limitations: None  Patient Goal (s): I want to find other ways other than medicine to manage my pain  Patient Self Reported Health Status: good  Rehabilitation Potential: good    Short Term Goals: To be accomplished in 2-3 weeks:  1. Patient will subjectively report full compliance with prescribed HEP. 2. Patent will demonstrate left hip IR/ER MMT 5/5 to improve ease with ambulation on uneven ground. 3. Patient will demonstrate supine right hip flexion AROM >/=100  degrees without pain to improve ease with curb management. Long Term Goals: To be accomplished in 4 weeks:  1. Patient will demonstrate a significant functional improvement as demonstrated by a score of >/=69 on FOTO. 2. Patient will demonstrate ability to ascend/descend 40 stairs with a step-over-step gait pattern with no more than 1 UE assist and no right hip pain to improve ease with work-related ADLs. 3. Patient will demonstrate right SLS >/=30 seconds without reproduction of right lateral hip burning to improve ease with ambulation on uneven ground. Frequency / Duration: Patient to be seen 2 times per week for 3-4 weeks. Patient/ Caregiver education and instruction: Diagnosis, prognosis, self care, activity modification and exercises   [x]  Plan of care has been reviewed with PTA    Keisha Valentino, PT 7/6/2018 7:01 AM  ________________________________________________________________________    I certify that the above Therapy Services are being furnished while the patient is under my care. I agree with the treatment plan and certify that this therapy is necessary.     [de-identified] Signature:____________________  Date:____________Time: _________    Please sign and return to [facility] - [address], [address] [place]   [phone] fax

## 2018-07-12 ENCOUNTER — HOSPITAL ENCOUNTER (OUTPATIENT)
Dept: PHYSICAL THERAPY | Age: 53
Discharge: HOME | End: 2018-07-12
Payer: COMMERCIAL

## 2018-07-12 PROCEDURE — 97112 NEUROMUSCULAR REEDUCATION: CPT

## 2018-07-12 PROCEDURE — 97140 MANUAL THERAPY 1/> REGIONS: CPT

## 2018-07-12 NOTE — PROGRESS NOTES
PT DAILY TREATMENT NOTE - [facility]     Patient Name: Manuela Parada  Date:2018  : 1965  [x]  Patient  Verified  Payor: BLUE BAKARI / Plan: Carmina Juarez [id number] PPO / Product Type: PPO /    In time:315  Out time:350  Total Treatment Time (min): 35  Total Timed Codes (min): 35  Therapist Time: 35   Visit #: 2 of 12    Treatment Area: Trochanteric bursitis, right hip [M70.61]    SUBJECTIVE  Pain Level (0-10 scale): 0  Any medication changes, allergies to medications, adverse drug reactions, diagnosis change, or new procedure performed?: [x] No    [] Yes (see summary sheet for update)  Subjective functional status/changes:   [] No changes reported  Patient reports currently her left ankle is bothering her more than her right hip.     OBJECTIVE    10 min Therapeutic Exercise:  [x] See flow sheet : Emphasis placed on improving available lumbar and hip AROM and LE strength   Rationale: increase ROM and increase strength to improve the patients ability to improve ease with ambulation     15 min Neuromuscular Re-education:  [x]  See flow sheet : Emphasis placed on improving LE proprioceptive and kinesthetic awareness and improving activation and recruitment of the LE musculature   Rationale: increase ROM, increase strength, improve balance and increase proprioception  to improve the patients ability to improve safety with community ADLs    10 min Manual Therapy:    Prone, Right Hip IR PROM with Hip ER Tack and Stretch  Prone, Right Hip IR Grade I-II Mobilizations  Supine, Right Hip AP Grade III-IV Mobilization (45 deg flexion,  IR, Add)  Supine, Right Hip IR 90/90 Antagonist Contract-Relax  Supine, Right Piriformis Contract-Relax >90 degrees   Rationale: decrease pain, increase ROM and increase tissue extensibility to improve ease with stair management        With   [] TE   [] TA   [] neuro   [] other: Patient Education: [x] Review HEP    [] Progressed/Changed HEP based on:   [] positioning   [] body mechanics   [] transfers   [] heat/ice application    [] other:      Pain Level (0-10 scale) post treatment: 0    ASSESSMENT/Changes in Function: Initiated PoC with good tolerance with completion of primarily non-weightbearing exercise secondary to left hip pain. Patient demonstrates improved activation of the hip abductors with ability to complete sidelying hip abduction without verbal nor tactile cuing required. Patient will continue to benefit from skilled PT services to modify and progress therapeutic interventions, address functional mobility deficits, address ROM deficits, address strength deficits, analyze and address soft tissue restrictions, analyze and cue movement patterns, analyze and modify body mechanics/ergonomics, assess and modify postural abnormalities and address imbalance/dizziness to attain remaining goals. []  See Plan of Care  []  See progress note/recertification  []  See Discharge Summary         Progress towards goals / Updated goals:    Short Term Goals: To be accomplished in 2-3 weeks:  1. Patient will subjectively report full compliance with prescribed HEP. Eval: HEP provided  Current: Met, HEP performance reported as prescribed, 7/12/2018  2. Patent will demonstrate right hip IR/ER MMT 5/5 to improve ease with ambulation on uneven ground. Eval: Right Hip IR 3/5 (p!), Right Hip IR 4/5 (p!)  3. Patient will demonstrate supine right hip flexion AROM >/=100  degrees without pain to improve ease with curb management. Eval: Right Hip Flexion = 90 degrees (p! 4/10)      Long Term Goals: To be accomplished in 4 weeks:  1. Patient will demonstrate a significant functional improvement as demonstrated by a score of >/=69 on FOTO. Eval: FOTO = 58  2. Patient will demonstrate ability to ascend/descend 40 stairs with a step-over-step gait pattern with no more than 1 UE assist and no right hip pain to improve ease with work-related ADLs. Eval: NT  3.  Patient will demonstrate right SLS >/=30 seconds without reproduction of right lateral hip burning to improve ease with ambulation on uneven ground.   Eval: Right SLS = 21 sec (right lateral hip \"burning\")    PLAN  [x]  Upgrade activities as tolerated     [x]  Continue plan of care  []  Update interventions per flow sheet       []  Discharge due to:_  []  Other:_      Dayton Jeff, PT 7/12/2018  8:05 AM    Future Appointments  Date Time Provider Feliberto Watkinsi   7/12/2018 3:30 PM Dayton Jeff, PT [facility]   7/16/2018 1:00 PM Urszula Smiley PTA [facility]   7/18/2018 1:00 PM Urszula Smiley PTA [facility]   9/7/2018 10:30 AM MD Aleena SongR [facility]

## 2018-07-16 ENCOUNTER — TELEPHONE (OUTPATIENT)
Dept: FAMILY MEDICINE CLINIC | Age: 53
End: 2018-07-16

## 2018-07-16 ENCOUNTER — HOSPITAL ENCOUNTER (OUTPATIENT)
Dept: PHYSICAL THERAPY | Age: 53
Discharge: HOME | End: 2018-07-16
Payer: COMMERCIAL

## 2018-07-16 PROCEDURE — 97110 THERAPEUTIC EXERCISES: CPT

## 2018-07-16 PROCEDURE — 97140 MANUAL THERAPY 1/> REGIONS: CPT

## 2018-07-16 NOTE — PROGRESS NOTES
PT DAILY TREATMENT NOTE - [facility]     Patient Name: Lisbet Cid  Date:2018  : 1965  [x]  Patient  Verified  Payor: BLUE BAKARI / Plan: Carmina Juarez [id number] PPO / Product Type: PPO /    In time:1:10  Out time:2:05  Total Treatment Time (min): 55  Total Timed Codes (min): 45  1:1 Treatment Time ( only): 40   Visit #: 3 of 12    Treatment Area: Trochanteric bursitis, right hip [M70.61]    SUBJECTIVE  Pain Level (0-10 scale): 7  Any medication changes, allergies to medications, adverse drug reactions, diagnosis change, or new procedure performed?: [x] No    [] Yes (see summary sheet for update)  Subjective functional status/changes:   [] No changes reported  Pt reports she woke up this morning with a lot of pain in her hip. Pt reports she avoids climbing stairs to help control her pain. OBJECTIVE    Modality rationale: decrease pain to improve the patients ability to decrease difficulty while performing tasks.     Min Type Additional Details    [] Estim:  []Unatt       []IFC  []Premod                        []Other:  []w/ice   []w/heat  Position:  Location:    [] Estim: []Att    []TENS instruct  []NMES                    []Other:  []w/US   []w/ice   []w/heat  Position:  Location:    []  Traction: [] Cervical       []Lumbar                       [] Prone          []Supine                       []Intermittent   []Continuous Lbs:  [] before manual  [] after manual    []  Ultrasound: []Continuous   [] Pulsed                           []1MHz   []3MHz W/cm2:  Location:    []  Iontophoresis with dexamethasone         Location: [] Take home patch   [] In clinic   10 []  Ice     [x]  heat  []  Ice massage  []  Laser   []  Anodyne Position:side lying   Location:right hip     []  Laser with stim  []  Other:  Position:  Location:    []  Vasopneumatic Device Pressure:       [] lo [] med [] hi   Temperature: [] lo [] med [] hi   [] Skin assessment post-treatment:  []intact []redness- no adverse reaction    []redness - adverse reaction:       35 min Therapeutic Exercise:  [x] See flow sheet :   Rationale: increase ROM and increase strength to improve the patients ability to increase tolerance to activities. 10 min Manual Therapy:  Hip PA mobs, prone hip flexor stretch, prone hip IR/ER stretch, ITband, glutes   Rationale: decrease pain, increase ROM, increase tissue extensibility and decrease trigger points to increase ease with ADLs. With   [] TE   [] TA   [] neuro   [] other: Patient Education: [x] Review HEP    [] Progressed/Changed HEP based on:   [] positioning   [] body mechanics   [] transfers   [] heat/ice application    [] other:      Other Objective/Functional Measures: right SLS = 23 sec (right lateral \"burning\")      Pain Level (0-10 scale) post treatment: 3    ASSESSMENT/Changes in Function: Pt was reported pain along IT band and greater troch. Pt planes to DC [place] due to having surgery on her ankle. After MHP pt reports a large decrease in pain and no \"clicking\" feeling. Patient will continue to benefit from skilled PT services to modify and progress therapeutic interventions, address functional mobility deficits, address ROM deficits, address strength deficits and analyze and address soft tissue restrictions to attain remaining goals. []  See Plan of Care  []  See progress note/recertification  []  See Discharge Summary         Progress towards goals / Updated goals:     Short Term Goals: To be accomplished in 2-3 weeks:  1. Patient will subjectively report full compliance with prescribed HEP. Eval: HEP provided  Current: Met, HEP performance reported as prescribed, 7/12/2018  2. Patent will demonstrate right hip IR/ER MMT 5/5 to improve ease with ambulation on uneven ground. Eval: Right Hip IR 3/5 (p!), Right Hip IR 4/5 (p!)  3. Patient will demonstrate supine right hip flexion AROM >/=100  degrees without pain to improve ease with curb management.   Eval: Right Hip Flexion = 90 degrees (p! 4/10)  Current: goal met: right hip flexor 115 deg without pain. 7/16/18      Long Term Goals: To be accomplished in 4 weeks:  1. Patient will demonstrate a significant functional improvement as demonstrated by a score of >/=69 on FOTO. Eval: FOTO = 58  2. Patient will demonstrate ability to ascend/descend 40 stairs with a step-over-step gait pattern with no more than 1 UE assist and no right hip pain to improve ease with work-related ADLs. Eval: NT  3. Patient will demonstrate right SLS >/=30 seconds without reproduction of right lateral hip burning to improve ease with ambulation on uneven ground.   Eval: Right SLS = 21 sec (right lateral hip \"burning\")  Current: Remains: right SLS = 23 sec (right lateral \"burning\") 7/16/18    PLAN  []  Upgrade activities as tolerated     [x]  Continue plan of care  []  Update interventions per flow sheet       []  Discharge due to:_  []  Other:_      Raquel Lepe PTA 7/16/2018  1:10 PM    Future Appointments  Date Time Provider Feliberto Muñiz   7/18/2018 1:00 PM Raquel Lepe PTA [facility]   9/7/2018 10:30 AM Janey Clemons MD [facility] 69

## 2018-07-16 NOTE — TELEPHONE ENCOUNTER
patient has questions about physical therapy and massage therapy. Want to know if bone lesions are hereditary.  Please contact patient when available for advice

## 2018-07-19 NOTE — TELEPHONE ENCOUNTER
Called patient at this time. LVM informing patient she will need to come in for an office visit at this time to discuss concerns

## 2018-08-27 NOTE — PROGRESS NOTES
[facility] - [facility]              [address]        [place], [address] [place]   [phone] fax    Discharge Summary  Patient name: Mani Paul Start of Care: 2018   Referral source: John Kwong MD : 1965   Medical/Treatment Diagnosis: Trochanteric bursitis, right hip [M70.61] Onset Date:2017     Prior Hospitalization: see medical history Provider#: [id number]   Medications: Verified on Patient Summary List    Comorbidities: Arthritis, BMI>30   Prior Level of Function: (I) Ambulation, (I) Work-related ADLs without modification, (I) Functional ADLs, Does not workout regularly, No sleep disturbances  Visits from Start of Care: 3    Missed Visits: 1  Reporting Period : 2018 to 2018    Summary of Care:    Short Term Goals: To be accomplished in 2-3 weeks:  1. Patient will subjectively report full compliance with prescribed HEP. Eval: HEP provided  At DC: Met, HEP performance reported as prescribed  2. Patent will demonstrate right hip IR/ER MMT 5/5 to improve ease with ambulation on uneven ground. Eval: Right Hip IR 3/5 (p!), Right Hip IR 4/5 (p!)  At DC: Inability to assess  3. Patient will demonstrate supine right hip flexion AROM >/=100  degrees without pain to improve ease with curb management. Eval: Right Hip Flexion = 90 degrees (p! 4/10)  At DC: Met, right hip flexor 115 deg without pain      Long Term Goals: To be accomplished in 4 weeks:  1. Patient will demonstrate a significant functional improvement as demonstrated by a score of >/=69 on FOTO. Eval: FOTO = 58  At DC: Inability to assess  2. Patient will demonstrate ability to ascend/descend 40 stairs with a step-over-step gait pattern with no more than 1 UE assist and no right hip pain to improve ease with work-related ADLs. Eval: NT  At DC: Inability to assess  3.  Patient will demonstrate right SLS >/=30 seconds without reproduction of right lateral hip burning to improve ease with ambulation on uneven ground. Eval: Right SLS = 21 sec (right lateral hip \"burning\")  At DC: Remains: right SLS = 23 sec (right lateral \"burning\")     ASSESSMENT/RECOMMENDATIONS:    At this time patient to be discharged in accordance with clinic 30 day policy with patient having last been seen within clinic 7/16/2018 at which time patient was placed on hold secondary to upcoming left ankle surgery scheduled for 7/19/2018. At time of last follow up appointment patient unsure regarding post-operative precautions and when she would be allowed to return back to physical therapy for right hip pain.     [x]Discontinue therapy: []Patient has reached or is progressing toward set goals      [x]Patient is non-compliant or has abdicated      []Due to lack of appreciable progress towards set [address], MICKEY 8/27/2018 8:34 AM

## 2018-08-29 ENCOUNTER — PATIENT OUTREACH (OUTPATIENT)
Dept: FAMILY MEDICINE CLINIC | Age: 53
End: 2018-08-29

## 2018-09-12 ENCOUNTER — TELEPHONE (OUTPATIENT)
Dept: FAMILY MEDICINE CLINIC | Age: 53
End: 2018-09-12

## 2018-09-12 ENCOUNTER — PATIENT OUTREACH (OUTPATIENT)
Dept: FAMILY MEDICINE CLINIC | Age: 53
End: 2018-09-12

## 2018-09-12 DIAGNOSIS — Z12.11 ENCOUNTER FOR SCREENING COLONOSCOPY: Primary | ICD-10-CM

## 2018-09-12 NOTE — PROGRESS NOTES
[facility] health screening: 
 
Discussed fit kit order has  and wished to send another kit to her but Ms David Bedolla stated Bhumi Bess I would like to complete the screening with a colonoscopy now. \" She did not elaborate on why she changed her mind. Informed her I would place the referral and she could expect a call from Dr. Jay Schroeder office to schedule her consultation appt. Informed her it would be during that consultation appt she could expect to review procedure, prep and insurance information.

## 2018-09-17 ENCOUNTER — OFFICE VISIT (OUTPATIENT)
Dept: FAMILY MEDICINE CLINIC | Age: 53
End: 2018-09-17

## 2018-09-17 ENCOUNTER — HOSPITAL ENCOUNTER (OUTPATIENT)
Dept: LAB | Age: 53
Discharge: HOME | End: 2018-09-17
Payer: COMMERCIAL

## 2018-09-17 VITALS
SYSTOLIC BLOOD PRESSURE: 127 MMHG | WEIGHT: 192 LBS | HEIGHT: 63 IN | DIASTOLIC BLOOD PRESSURE: 87 MMHG | HEART RATE: 77 BPM | TEMPERATURE: 98.5 F | RESPIRATION RATE: 18 BRPM | BODY MASS INDEX: 34.02 KG/M2 | OXYGEN SATURATION: 97 %

## 2018-09-17 DIAGNOSIS — M25.50 ARTHRALGIA, UNSPECIFIED JOINT: Primary | ICD-10-CM

## 2018-09-17 DIAGNOSIS — Z12.11 SCREEN FOR COLON CANCER: ICD-10-CM

## 2018-09-17 DIAGNOSIS — M25.50 ARTHRALGIA, UNSPECIFIED JOINT: ICD-10-CM

## 2018-09-17 DIAGNOSIS — G43.909 MIGRAINE WITHOUT STATUS MIGRAINOSUS, NOT INTRACTABLE, UNSPECIFIED MIGRAINE TYPE: ICD-10-CM

## 2018-09-17 DIAGNOSIS — M79.672 LEFT FOOT PAIN: ICD-10-CM

## 2018-09-17 PROCEDURE — 86430 RHEUMATOID FACTOR TEST QUAL: CPT | Performed by: FAMILY MEDICINE

## 2018-09-17 PROCEDURE — 86225 DNA ANTIBODY NATIVE: CPT | Performed by: FAMILY MEDICINE

## 2018-09-17 NOTE — PROGRESS NOTES
Chief Complaint   Patient presents with    Follow-up     Discuss Getting Tested  for Colon cancer     1. Have you been to the ER, urgent care clinic since your last visit? Hospitalized since your last visit? No    2. Have you seen or consulted any other health care providers outside of the [facility] since your last visit? Include any pap smears or colon screening. No     HPI  Mani Paul comes in for follow-up care. Patient would like to have colon cancer screening done. She is 53 years and is yet to get a screening colonoscopy. Denies any change in bowel habits or blood in the stool. Her brother was recently diagnosed with colon cancer. He is 11years older than her. Was told he may have Barragan syndrome. She wonders about getting checked for this. This would involve genetic testing. I will refer her for colon cancer screening. She will discuss with her specialist about genetic testing for colon cancer. Arthralgia: Patient has generalized joint aches. These are worse when the weather is cold. She does have early morning stiffness. No swelling or redness of the joints however. This affects the hands, hips, elbows and shoulders. Also affects the knees. Given the poorly arthralgia will check rheumatoid factor and SHIREEN. I will follow-up with with the results. She can take Tylenol arthritis or NSAIDs as needed for pain. Headache: Patient has used Zomig in the past for headache. These are fewer and far in between. Continue current treatment plan. Foot pain: Patient has a history of left foot pain. Was seen by the podiatrist and did have surgical repair. This was in July. Since then still does use Cam Boot and does not bear weight on the left foot. She does have a walking scooter to help with ambulation. Feels frustrated that she is still unable to walk but I did encourage her and let her know that the it will take time for the healing process to occur.     Past Medical History  Past Medical History:   Diagnosis Date    Depression        Surgical History  Past Surgical History:   Procedure Laterality Date    HX KNEE ARTHROSCOPY      KNEE SCOPE, ALLOGRAFT IMPANT          Medications  Current Outpatient Prescriptions   Medication Sig Dispense Refill    cholecalciferol (VITAMIN D3) 1,000 unit tablet Take  by mouth daily.  ascorbic acid, vitamin C, (VITAMIN C) 500 mg tablet Take  by mouth.  cyanocobalamin (VITAMIN B-12) 1,000 mcg tablet Take 1,000 mcg by mouth daily.  ZOLMitriptan (ZOMIG-ZMT) 5 mg disintegrating tablet One tab at HA onset, may repeat x1 > 2 hours if needed. Max 10 mg/24 hours 9 Tab 2       Allergies  Allergies   Allergen Reactions    Augmentin [Amoxicillin-Pot Clavulanate] Nausea and Vomiting       Family History  Family History   Problem Relation Age of Onset    Cancer Mother     Cancer Father     No Known Problems Brother        Social History  Social History     Social History    Marital status:      Spouse name: N/A    Number of children: N/A    Years of education: N/A     Occupational History    Not on file. Social History Main Topics    Smoking status: Never Smoker    Smokeless tobacco: Never Used    Alcohol use No    Drug use: No    Sexual activity: Not on file     Other Topics Concern    Not on file     Social History Narrative       Review of Systems  Review of Systems - Negative except as noted above in the HPI.     Vital Signs  Visit Vitals    /87 (BP 1 Location: Left arm, BP Patient Position: Sitting)    Pulse 77    Temp 98.5 °F (36.9 °C) (Oral)    Resp 18    Ht 5' 3\" (1.6 m)    Wt 192 lb (87.1 kg)    SpO2 97%    BMI 34.01 kg/m2         Physical Exam  Physical Examination: General appearance - alert, well appearing, and in no distress, oriented to person, place, and time and acyanotic, in no respiratory distress  Mental status - alert, oriented to person, place, and time, normal mood, behavior, speech, dress, motor activity, and thought processes  Neck - supple, no significant adenopathy  Lymphatics - no palpable lymphadenopathy  Chest - clear to auscultation, no wheezes, rales or rhonchi, symmetric air entry  Heart - normal rate and regular rhythm, S1 and S2 normal  Back exam - limited range of motion  Neurological - alert, oriented, normal speech, no focal findings or movement disorder noted  Musculoskeletal -left leg and foot in a Cam boot, no erythema or swelling. Patient is able to move her toes. Extremities - intact peripheral pulses    Results  Results for orders placed or performed during the hospital encounter of 22/96/92   METABOLIC PANEL, BASIC   Result Value Ref Range    Sodium 140 136 - 145 mmol/L    Potassium 4.2 3.5 - 5.5 mmol/L    Chloride 103 100 - 108 mmol/L    CO2 31 21 - 32 mmol/L    Anion gap 6 3.0 - 18 mmol/L    Glucose 81 74 - 99 mg/dL    BUN 15 7.0 - 18 MG/DL    Creatinine 0.97 0.6 - 1.3 MG/DL    BUN/Creatinine ratio 15 12 - 20      GFR est AA >60 >60 ml/min/1.73m2    GFR est non-AA >60 >60 ml/min/1.73m2    Calcium 9.6 8.5 - 10.1 MG/DL   CBC W/O DIFF   Result Value Ref Range    WBC 8.8 4.6 - 13.2 K/uL    RBC 4.24 4.20 - 5.30 M/uL    HGB 14.3 12.0 - 16.0 g/dL    HCT 44.2 35.0 - 45.0 %    .2 (H) 74.0 - 97.0 FL    MCH 33.7 24.0 - 34.0 PG    MCHC 32.4 31.0 - 37.0 g/dL    RDW 12.9 11.6 - 14.5 %    PLATELET 646 363 - 649 K/uL    MPV 10.5 9.2 - 11.8 FL       ASSESSMENT and PLAN    ICD-10-CM ICD-9-CM    1. Arthralgia, unspecified joint M25.50 719.40 RHEUMATOID FACTOR, QL      SHIREEN COMPREHENSIVE PANEL      COLLECTION VENOUS BLOOD,VENIPUNCTURE   2. Screen for colon cancer Z12.11 V76.51 REFERRAL TO COLON AND RECTAL SURGERY   3. Left foot pain M79.672 729.5    4.  Migraine without status migrainosus, not intractable, unspecified migraine type G43.909 346.90      lab results and schedule of future lab studies reviewed with patient  reviewed diet, exercise and weight control  reviewed medications and side effects in detail    I have discussed the diagnosis with the patient and the intended plan of care as seen in the above orders. The patient has received an after-visit summary and questions were answered concerning future plans. I have discussed medication, side effects, and warnings with the patient in detail. The patient verbalized understanding and is in agreement with the plan of care. The patient will contact the office with any additional concerns.     Black Choe MD

## 2018-09-17 NOTE — MR AVS SNAPSHOT
[facility] [address] [facility] 
[phone] Patient: Gaby Villalobos MRN: [id number] :1965 Visit Information Date & Time Provider Department Dept. Phone Encounter #  
 2018 12:15 PM Jung Mullins. [address] [place] Lashay Samuels [phone] [id number] Follow-up Instructions Return in about 3 months (around 2018), or if symptoms worsen or fail to improve, for arthralgia, leg pain. Upcoming Health Maintenance Date Due DTaP/Tdap/Td series (1 - Tdap) 1988 FOBT Q 1 YEAR AGE 50-75 2017 Influenza Age 5 to Adult 2018 BREAST CANCER SCRN MAMMOGRAM 9/15/2019 PAP AKA CERVICAL CYTOLOGY 2020 Allergies as of 2018  Review Complete On: 2018 By: Luis Aguirre, PT Severity Noted Reaction Type Reactions Augmentin [Amoxicillin-pot Clavulanate]  2018    Nausea and Vomiting Current Immunizations  Never Reviewed No immunizations on file. Not reviewed this visit You Were Diagnosed With   
  
 Codes Comments Arthralgia, unspecified joint    -  Primary ICD-10-CM: M25.50 ICD-9-CM: 719.40 Screen for colon cancer     ICD-10-CM: Z12.11 ICD-9-CM: V76.51 Vitals BP Pulse Temp Resp Height(growth percentile) Weight(growth percentile) 127/87 (BP 1 Location: Left arm, BP Patient Position: Sitting) 77 98.5 °F (36.9 °C) (Oral) 18 5' 3\" (1.6 m) 192 lb (87.1 kg) SpO2 BMI OB Status Smoking Status 97% 34.01 kg/m2 Menopause Never Smoker BMI and BSA Data Body Mass Index Body Surface Area 34.01 kg/m 2 1.97 m 2 Preferred Pharmacy Pharmacy Name Phone [address] [facility], [address] [place] [address] [phone] Your Updated Medication List  
  
   
This list is accurate as of 18  1:12 PM.  Always use your most recent med list.  
  
  
  
  
 VITAMIN B-12 1,000 mcg tablet Generic drug:  cyanocobalamin Take 1,000 mcg by mouth daily. VITAMIN C 500 mg tablet Generic drug:  ascorbic acid (vitamin C) Take  by mouth. VITAMIN D3 1,000 unit tablet Generic drug:  cholecalciferol Take  by mouth daily. ZOLMitriptan 5 mg disintegrating tablet Commonly known as:  ZOMIG-ZMT One tab at HA onset, may repeat x1 > 2 hours if needed. Max 10 mg/24 hours Follow-up Instructions Return in about 3 months (around 12/17/2018), or if symptoms worsen or fail to improve, for arthralgia, leg pain. To-Do List   
 09/17/2018 Lab:  SHIREEN COMPREHENSIVE PANEL   
  
 09/17/2018 Lab:  RHEUMATOID FACTOR, QL Introducing [facility]! John Ruiz introduces [employer] patient portal. Now you can access parts of your medical record, email your doctor's office, and request medication refills online. 1. In your internet browser, go to [email or web address] 2. Click on the First Time User? Click Here link in the Sign In box. You will see the New Member Sign Up page. 3. Enter your [employer] Access Code exactly as it appears below. You will not need to use this code after youve completed the sign-up process. If you do not sign up before the expiration date, you must request a new code. · [employer] Access Code: [email or web address] Expires: 12/16/2018  1:11 PM 
 
4. Enter the last four digits of your Social Security Number (xxxx) and Date of Birth (mm/dd/yyyy) as indicated and click Submit. You will be taken to the next sign-up page. 5. Create a [employer] ID. This will be your [employer] login ID and cannot be changed, so think of one that is secure and easy to remember. 6. Create a [employer] password. You can change your password at any time. 7. Enter your Password Reset Question and Answer. This can be used at a later time if you forget your password. 8. Enter your e-mail address. You will receive e-mail notification when new information is available in [address] 9. Click Sign Up. You can now view and download portions of your medical record. 10. Click the Download Summary menu link to download a portable copy of your medical information. If you have questions, please visit the Frequently Asked Questions section of the [employer] website. Remember, [employer] is NOT to be used for urgent needs. For medical emergencies, dial 911. Now available from your iPhone and Android! Please provide this summary of care documentation to your next provider. Your primary care clinician is listed as Gaviota Mendoza. If you have any questions after today's visit, please call [phone].

## 2018-09-18 PROBLEM — G43.909 MIGRAINE WITHOUT STATUS MIGRAINOSUS, NOT INTRACTABLE: Status: ACTIVE | Noted: 2018-09-18

## 2018-09-18 LAB — RHEUMATOID FACT SER QL LA: NEGATIVE

## 2018-09-19 LAB
CENTROMERE B AB SER-ACNC: <0.2 AI (ref 0–0.9)
CHROMATIN AB SERPL-ACNC: <0.2 AI (ref 0–0.9)
DSDNA AB SER-ACNC: 1 IU/ML (ref 0–9)
ENA JO1 AB SER-ACNC: <0.2 AI (ref 0–0.9)
ENA RNP AB SER-ACNC: 0.4 AI (ref 0–0.9)
ENA SCL70 AB SER-ACNC: <0.2 AI (ref 0–0.9)
ENA SM AB SER-ACNC: <0.2 AI (ref 0–0.9)
ENA SS-A AB SER-ACNC: <0.2 AI (ref 0–0.9)
ENA SS-B AB SER-ACNC: 0.5 AI (ref 0–0.9)
SEE BELOW, 164869: NORMAL

## 2018-09-28 NOTE — PROGRESS NOTES
Please let patient know her rheumatoid factor lab results are negative. She should follow-up with me in the clinic as previously discussed.  
Shannan Cruz MD

## 2018-11-06 ENCOUNTER — PATIENT OUTREACH (OUTPATIENT)
Dept: FAMILY MEDICINE CLINIC | Age: 53
End: 2018-11-06

## 2018-11-13 ENCOUNTER — OFFICE VISIT (OUTPATIENT)
Dept: SURGERY | Age: 53
End: 2018-11-13

## 2018-11-13 VITALS
WEIGHT: 200 LBS | HEART RATE: 80 BPM | BODY MASS INDEX: 35.44 KG/M2 | HEIGHT: 63 IN | TEMPERATURE: 98.3 F | RESPIRATION RATE: 16 BRPM | OXYGEN SATURATION: 98 %

## 2018-11-13 DIAGNOSIS — Z12.11 COLON CANCER SCREENING: Primary | ICD-10-CM

## 2018-11-13 NOTE — PROGRESS NOTES
Review of Systems   Constitutional: Negative. HENT: Negative. Eyes: Negative. Respiratory: Negative. Cardiovascular: Positive for leg swelling. Negative for chest pain, palpitations, orthopnea, claudication and PND. Left leg swelling   Gastrointestinal: Positive for heartburn. Negative for abdominal pain, blood in stool, constipation, diarrhea, melena, nausea and vomiting. Genitourinary: Negative. Musculoskeletal: Positive for joint pain. Negative for back pain, falls, myalgias and neck pain. Skin: Negative. Neurological: Negative. Endo/Heme/Allergies: Negative. Psychiatric/Behavioral: Negative for depression, hallucinations, memory loss, substance abuse and suicidal ideas. The patient has insomnia. The patient is not nervous/anxious. Colon Screen    Patient: Gualberto Winn MRN: [id number]  SSN: [id number]    YOB: 1965  Age: 48 y.o. Sex: female        Subjective:   Gualberto Winn was referred by her PCP, Michelle Gao MD.  Patient referred for colonoscopy for   Family history of coloretal cancer (screening only). Patient denies rectal pain or bleeding. Abdominal surgeries as described below, specifically tubal ligation. Family history as described below, specifically mother and brother with colon cancer. Patient has never had a colonoscopy.     Allergies   Allergen Reactions    Augmentin [Amoxicillin-Pot Clavulanate] Nausea and Vomiting       Past Medical History:   Diagnosis Date    Depression     Hiatal hernia     causes heartburn occasionally     Past Surgical History:   Procedure Laterality Date    HX HYSTEROSCOPY WITH ENDOMETRIAL ABLATION      HX KNEE ARTHROSCOPY Left     HX ORTHOPAEDIC Left     ankle    HX TUBAL LIGATION      KNEE SCOPE, ALLOGRAFT IMPANT        Family History   Problem Relation Age of Onset    Cancer Mother     Colon Cancer Mother     Cancer Father     Colon Cancer Brother      Social History     Tobacco Use    Smoking status: Never Smoker    Smokeless tobacco: Never Used   Substance Use Topics    Alcohol use: No      Prior to Admission medications    Medication Sig Start Date End Date Taking? Authorizing Provider   cholecalciferol (VITAMIN D3) 1,000 unit tablet Take  by mouth daily. Yes Provider, Historical   ascorbic acid, vitamin C, (VITAMIN C) 500 mg tablet Take  by mouth. Yes Provider, Historical   cyanocobalamin (VITAMIN B-12) 1,000 mcg tablet Take 1,000 mcg by mouth daily. Yes Provider, Historical   ZOLMitriptan (ZOMIG-ZMT) 5 mg disintegrating tablet One tab at HA onset, may repeat x1 > 2 hours if needed. Max 10 mg/24 hours 3/12/18  Yes Maral Marie NP          Review of Systems:      Risks colonoscopy described- colon injury, missed lesion, anesthesia problems, bleeding       Annette Mcallister, RADHA  November 13, 8658  3:42 PM

## 2018-12-19 ENCOUNTER — PATIENT OUTREACH (OUTPATIENT)
Dept: FAMILY MEDICINE CLINIC | Age: 53
End: 2018-12-19

## 2018-12-19 NOTE — PROGRESS NOTES
health screening:    Noted Ms Felipa Nowak is now scheduled for her colonoscopy with DR. Kierra Sol 2/27/19. Will follow.

## 2019-01-15 ENCOUNTER — OFFICE VISIT (OUTPATIENT)
Dept: FAMILY MEDICINE CLINIC | Age: 54
End: 2019-01-15

## 2019-01-15 ENCOUNTER — HOSPITAL ENCOUNTER (OUTPATIENT)
Dept: LAB | Age: 54
Discharge: HOME | End: 2019-01-15
Payer: COMMERCIAL

## 2019-01-15 VITALS
OXYGEN SATURATION: 91 % | HEART RATE: 112 BPM | BODY MASS INDEX: 34.91 KG/M2 | WEIGHT: 197 LBS | TEMPERATURE: 100 F | RESPIRATION RATE: 16 BRPM | DIASTOLIC BLOOD PRESSURE: 90 MMHG | SYSTOLIC BLOOD PRESSURE: 129 MMHG | HEIGHT: 63 IN

## 2019-01-15 DIAGNOSIS — R06.02 SOB (SHORTNESS OF BREATH): Primary | ICD-10-CM

## 2019-01-15 DIAGNOSIS — R19.7 DIARRHEA, UNSPECIFIED TYPE: ICD-10-CM

## 2019-01-15 DIAGNOSIS — F39 MOOD DISORDER (HCC): ICD-10-CM

## 2019-01-15 DIAGNOSIS — R00.0 TACHYCARDIA: ICD-10-CM

## 2019-01-15 DIAGNOSIS — R06.02 SOB (SHORTNESS OF BREATH): ICD-10-CM

## 2019-01-15 DIAGNOSIS — E66.01 SEVERE OBESITY WITH BODY MASS INDEX (BMI) OF 35.0 TO 39.9 WITH SERIOUS COMORBIDITY (HCC): ICD-10-CM

## 2019-01-15 PROCEDURE — 80053 COMPREHEN METABOLIC PANEL: CPT

## 2019-01-15 PROCEDURE — 84443 ASSAY THYROID STIM HORMONE: CPT

## 2019-01-15 PROCEDURE — 85025 COMPLETE CBC W/AUTO DIFF WBC: CPT

## 2019-01-15 NOTE — PROGRESS NOTES
Angel Leon 48 y.o. female being seen for Chief Complaint Patient presents with  Migraine  Shortness of Breath  Dizziness Migraine started Friday 1/11/2019 eased off Sunday  
the patient states that she feels burleson like its trying to come back SOB off and on 1. Have you been to the ER, urgent care clinic since your last visit? Hospitalized since your last visit? Yes When: 1/11/2019 Where: [facility] Reason for visit: Migraine . 2. Have you seen or consulted any other health care providers outside of the [address] [place] [address] since your last visit? Include any pap smears or colon screening. see above note Pharmacy has been verified Care team has been verified/updated HPI Angel Leon comes in for acute care. Patient has cough and shortness of breath. This has been ongoing for the past 4 days. Cough is occasional and nonproductive. No fever or chills. Denies wheezing or chest pain. She feels generally weak and tired. She also has diarrhea. Anytime she eats meals and she has been trying more of the chicken broth and rice she has loose stool. She also had a migraine that lasted 4 days. Went to the emergency room for this and was given morphine injection. This did not help much. Migraine has now improved. She does have Zomig that she takes for migrainous headaches. She denies focal weakness, numbness or tingling. Her heart rate is elevated though she denies palpitations or syncope. She is tachycardic. I did do an EKG that shows sinus tachycardia. States that she has been trying to keep up with her fluids. I will check labs. Will do a CBC, CMP and thyroid levels. We will also check a chest x-ray. I will follow-up with his results. Patient has a BMI of 34.90. She is doing lifestyle and dietary modification. She has a history of mood disorder. This is stable. Past Medical History Past Medical History:  
Diagnosis Date  Depression  Hiatal hernia   
 causes heartburn occasionally Surgical History Past Surgical History:  
Procedure Laterality Date  HX HYSTEROSCOPY WITH ENDOMETRIAL ABLATION    
 HX KNEE ARTHROSCOPY Left  HX ORTHOPAEDIC Left   
 ankle  HX TUBAL LIGATION    
 KNEE SCOPE, ALLOGRAFT IMPANT Medications Current Outpatient Medications Medication Sig Dispense Refill  BIOTIN PO Take  by mouth.  cholecalciferol (VITAMIN D3) 1,000 unit tablet Take  by mouth daily.  ascorbic acid, vitamin C, (VITAMIN C) 500 mg tablet Take  by mouth.  cyanocobalamin (VITAMIN B-12) 1,000 mcg tablet Take 1,000 mcg by mouth daily.  ZOLMitriptan (ZOMIG-ZMT) 5 mg disintegrating tablet One tab at HA onset, may repeat x1 > 2 hours if needed. Max 10 mg/24 hours 9 Tab 2 Allergies Allergies Allergen Reactions  Augmentin [Amoxicillin-Pot Clavulanate] Nausea and Vomiting Family History Family History Problem Relation Age of Onset  Cancer Mother  Colon Cancer Mother  Cancer Father  Colon Cancer Brother Social History Social History Socioeconomic History  Marital status:  Spouse name: Not on file  Number of children: Not on file  Years of education: Not on file  Highest education level: Not on file Social Needs  Financial resource strain: Not on file  Food insecurity - worry: Not on file  Food insecurity - inability: Not on file  Transportation needs - medical: Not on file  Transportation needs - non-medical: Not on file Occupational History  Not on file Tobacco Use  Smoking status: Never Smoker  Smokeless tobacco: Never Used Substance and Sexual Activity  Alcohol use: No  
 Drug use: No  
 Sexual activity: Not on file Other Topics Concern  Not on file Social History Narrative  Not on file Review of Systems Review of Systems - History obtained from the patient General ROS: positive for  - chills, fatigue and malaise Psychological ROS: negative Ophthalmic ROS: positive for - uses glasses ENT ROS: negative Allergy and Immunology ROS: negative Hematological and Lymphatic ROS: negative Respiratory ROS: positive for - cough and shortness of breath 
negative for - hemoptysis, pleuritic pain, sputum changes or wheezing Cardiovascular ROS: positive for - rapid heart rate 
negative for - chest pain, edema or loss of consciousness Gastrointestinal ROS: positive for - diarrhea Genito-Urinary ROS: no dysuria, trouble voiding, or hematuria Musculoskeletal ROS: negative Neurological ROS: no TIA or stroke symptoms Dermatological ROS: negative Vital Signs Visit Vitals /90 Pulse (!) 112 Temp 100 °F (37.8 °C) (Oral) Resp 16 Ht 5' 3\" (1.6 m) Wt 197 lb (89.4 kg) SpO2 91% BMI 34.90 kg/m² Physical Exam 
Physical Examination: General appearance - oriented to person, place, and time, acyanotic, in no respiratory distress and ill-appearing Mental status - alert, oriented to person, place, and time, affect appropriate to mood Mouth - mucous membranes moist, pharynx normal without lesions Neck - supple, no significant adenopathy Lymphatics - no palpable lymphadenopathy, no hepatosplenomegaly Chest - clear to auscultation, no wheezes, rales or rhonchi, symmetric air entry Heart - S1 and S2 normal, no murmurs noted, tachycardia Abdomen - soft, nontender, nondistended, no masses or organomegaly 
no rebound tenderness noted Back exam - limited range of motion Neurological - motor and sensory grossly normal bilaterally Musculoskeletal - no muscular tenderness noted Extremities - no pedal edema noted, intact peripheral pulses Results Results for orders placed or performed during the hospital encounter of 09/17/18 RHEUMATOID FACTOR, QL Result Value Ref Range  Rheumatoid factor, QL NEGATIVE  NEG    
SHIREEN COMPREHENSIVE PANEL  
 Result Value Ref Range Anti-DNA (DS) Ab, QT 1 0 - 9 IU/mL  
 RNP Abs 0.4 0.0 - 0.9 AI Sahu Abs <0.2 0.0 - 0.9 AI Scleroderma-70 Ab <0.2 0.0 - 0.9 AI Sjogren's Anti-SS-A <0.2 0.0 - 0.9 AI Sjogren's Anti-SS-B 0.5 0.0 - 0.9 AI Antichromatin Ab <0.2 0.0 - 0.9 AI Anti-Iesha-1 <0.2 0.0 - 0.9 AI Centromere B Ab <0.2 0.0 - 0.9 AI See below Comment ASSESSMENT and PLAN 
  ICD-10-CM ICD-9-CM 1. SOB (shortness of breath) R06.02 786.05 XR CHEST PA LAT  
   CBC WITH AUTOMATED DIFF  
   METABOLIC PANEL, COMPREHENSIVE 2. Tachycardia R00.0 785.0 AMB POC EKG ROUTINE W/ 12 LEADS, INTER & REP  
   TSH 3RD GENERATION 3. Diarrhea, unspecified type R19.7 787.91   
4. Severe obesity with body mass index (BMI) of 35.0 to 39.9 with serious comorbidity (HCC) E66.01 278.01   
5. [facility] ([facility] F39 296.90 Discussed the patient's BMI with her. The BMI follow up plan is as follows:  
dietary management education, guidance, and counseling 
encourage exercise 
monitor weight 
lab results and schedule of future lab studies reviewed with patient 
reviewed diet, exercise and weight control 
cardiovascular risk and specific lipid/LDL goals reviewed 
reviewed medications and side effects in detail 
radiology results and schedule of future radiology studies reviewed with patient I have discussed the diagnosis with the patient and the intended plan of care as seen in the above orders. The patient has received an after-visit summary and questions were answered concerning future plans. I have discussed medication, side effects, and warnings with the patient in detail. The patient verbalized understanding and is in agreement with the plan of care. The patient will contact the office with any additional concerns.  
 
Mono Landry MD

## 2019-01-15 NOTE — PROGRESS NOTES
Emmy Days 48 y.o. female had blood work done in left arm Questions / concerns answered No reaction noted, tolerated well

## 2019-01-15 NOTE — LETTER
NOTIFICATION RETURN TO WORK 
 
1/15/2019 4:35 PM 
 
Ms. Jose G Molina [address] [facility] To Whom It May Concern: 
 
Jose G Molina is currently under the care of [address]. She will return to work on: 01/18/2019 If there are questions or concerns please have the patient contact our office. Sincerely, Lisbet Infante MD

## 2019-01-16 LAB
ALBUMIN SERPL-MCNC: 3.7 G/DL (ref 3.4–5)
ALBUMIN/GLOB SERPL: 0.9 {RATIO} (ref 0.8–1.7)
ALP SERPL-CCNC: 88 U/L (ref 45–117)
ALT SERPL-CCNC: 34 U/L (ref 13–56)
ANION GAP SERPL CALC-SCNC: 8 MMOL/L (ref 3–18)
AST SERPL-CCNC: 68 U/L (ref 15–37)
BILIRUB SERPL-MCNC: 0.5 MG/DL (ref 0.2–1)
BUN SERPL-MCNC: 10 MG/DL (ref 7–18)
BUN/CREAT SERPL: 11 (ref 12–20)
CALCIUM SERPL-MCNC: 8.9 MG/DL (ref 8.5–10.1)
CHLORIDE SERPL-SCNC: 97 MMOL/L (ref 100–108)
CO2 SERPL-SCNC: 32 MMOL/L (ref 21–32)
CREAT SERPL-MCNC: 0.87 MG/DL (ref 0.6–1.3)
GLOBULIN SER CALC-MCNC: 3.9 G/DL (ref 2–4)
GLUCOSE SERPL-MCNC: 95 MG/DL (ref 74–99)
POTASSIUM SERPL-SCNC: 3.5 MMOL/L (ref 3.5–5.5)
PROT SERPL-MCNC: 7.6 G/DL (ref 6.4–8.2)
SODIUM SERPL-SCNC: 137 MMOL/L (ref 136–145)
TSH SERPL DL<=0.05 MIU/L-ACNC: 2 UIU/ML (ref 0.36–3.74)

## 2019-01-16 NOTE — PATIENT INSTRUCTIONS
Body Mass Index: Care Instructions Your Care Instructions Body mass index (BMI) can help you see if your weight is raising your risk for health problems. It uses a formula to compare how much you weigh with how tall you are. · A BMI lower than 18.5 is considered underweight. · A BMI between 18.5 and 24.9 is considered healthy. · A BMI between 25 and 29.9 is considered overweight. A BMI of 30 or higher is considered obese. If your BMI is in the normal range, it means that you have a lower risk for weight-related health problems. If your BMI is in the overweight or obese range, you may be at increased risk for weight-related health problems, such as high blood pressure, heart disease, stroke, arthritis or joint pain, and diabetes. If your BMI is in the underweight range, you may be at increased risk for health problems such as fatigue, lower protection (immunity) against illness, muscle loss, bone loss, hair loss, and hormone problems. BMI is just one measure of your risk for weight-related health problems. You may be at higher risk for health problems if you are not active, you eat an unhealthy diet, or you drink too much alcohol or use tobacco products. Follow-up care is a key part of your treatment and safety. Be sure to make and go to all appointments, and call your doctor if you are having problems. It's also a good idea to know your test results and keep a list of the medicines you take. How can you care for yourself at home? · Practice healthy eating habits. This includes eating plenty of fruits, vegetables, whole grains, lean protein, and low-fat dairy. · If your doctor recommends it, get more exercise. Walking is a good choice. Bit by bit, increase the amount you walk every day. Try for at least 30 minutes on most days of the week. · Do not smoke. Smoking can increase your risk for health problems.  If you need help quitting, talk to your doctor about stop-smoking programs and medicines. These can increase your chances of quitting for good. · Limit alcohol to 2 drinks a day for men and 1 drink a day for women. Too much alcohol can cause health problems. If you have a BMI higher than 25 · Your doctor may do other tests to check your risk for weight-related health problems. This may include measuring the distance around your waist. A waist measurement of more than 40 inches in men or 35 inches in women can increase the risk of weight-related health problems. · Talk with your doctor about steps you can take to stay healthy or improve your health. You may need to make lifestyle changes to lose weight and stay healthy, such as changing your diet and getting regular exercise. If you have a BMI lower than 18.5 · Your doctor may do other tests to check your risk for health problems. · Talk with your doctor about steps you can take to stay healthy or improve your health. You may need to make lifestyle changes to gain or maintain weight and stay healthy, such as getting more healthy foods in your diet and doing exercises to build muscle. Where can you learn more? Go to http://nolan-andra.info/. Enter S176 in the search box to learn more about \"Body Mass Index: Care Instructions. \" Current as of: October 13, 2016 Content Version: 11.4 © 2530-2316 [employer]. Care instructions adapted under license by [employer] (which disclaims liability or warranty for this information). If you have questions about a medical condition or this instruction, always ask your healthcare professional. [address] any warranty or liability for your use of this information.

## 2019-01-17 ENCOUNTER — OFFICE VISIT (OUTPATIENT)
Dept: FAMILY MEDICINE CLINIC | Age: 54
End: 2019-01-17

## 2019-01-17 VITALS
RESPIRATION RATE: 20 BRPM | WEIGHT: 194 LBS | SYSTOLIC BLOOD PRESSURE: 112 MMHG | HEART RATE: 88 BPM | OXYGEN SATURATION: 93 % | BODY MASS INDEX: 34.38 KG/M2 | HEIGHT: 63 IN | TEMPERATURE: 98.5 F | DIASTOLIC BLOOD PRESSURE: 74 MMHG

## 2019-01-17 DIAGNOSIS — J18.9 PNEUMONIA DUE TO INFECTIOUS ORGANISM, UNSPECIFIED LATERALITY, UNSPECIFIED PART OF LUNG: Primary | ICD-10-CM

## 2019-01-17 DIAGNOSIS — R06.02 SOB (SHORTNESS OF BREATH): ICD-10-CM

## 2019-01-17 LAB
BASOPHILS # BLD: 0 K/UL (ref 0–0.1)
BASOPHILS NFR BLD: 0 % (ref 0–2)
DIFFERENTIAL METHOD BLD: ABNORMAL
EOSINOPHIL # BLD: 0 K/UL (ref 0–0.4)
EOSINOPHIL NFR BLD: 1 % (ref 0–5)
ERYTHROCYTE [DISTWIDTH] IN BLOOD BY AUTOMATED COUNT: 13.3 % (ref 11.6–14.5)
HCT VFR BLD AUTO: 47.5 % (ref 35–45)
HGB BLD-MCNC: 15 G/DL (ref 12–16)
LYMPHOCYTES # BLD: 2.7 K/UL (ref 0.9–3.6)
LYMPHOCYTES NFR BLD: 57 % (ref 21–52)
MCH RBC QN AUTO: 32.9 PG (ref 24–34)
MCHC RBC AUTO-ENTMCNC: 31.6 G/DL (ref 31–37)
MCV RBC AUTO: 104.2 FL (ref 74–97)
MONOCYTES # BLD: 0.7 K/UL (ref 0.05–1.2)
MONOCYTES NFR BLD: 16 % (ref 3–10)
NEUTS SEG # BLD: 1.2 K/UL (ref 1.8–8)
NEUTS SEG NFR BLD: 26 % (ref 40–73)
PLATELET # BLD AUTO: 237 K/UL (ref 135–420)
PLATELET COMMENTS,PCOM: ADEQUATE
PMV BLD AUTO: 11.3 FL (ref 9.2–11.8)
RBC # BLD AUTO: 4.56 M/UL (ref 4.2–5.3)
RBC MORPH BLD: ABNORMAL
WBC # BLD AUTO: 4.6 K/UL (ref 4.6–13.2)

## 2019-01-17 RX ORDER — LEVOFLOXACIN 500 MG/1
500 TABLET, FILM COATED ORAL DAILY
Qty: 10 TAB | Refills: 0 | Status: SHIPPED | OUTPATIENT
Start: 2019-01-17 | End: 2019-01-27

## 2019-01-17 NOTE — PATIENT INSTRUCTIONS
Patient with SOB, had CXR done that has multifocal infiltrates. Suspect pneumonia. Patient says she is extremely SOB and unable to talk. May benefit from CTA chest or D dimer test to evaluate for PE. Referred to the ED for assessment and management.

## 2019-01-17 NOTE — PROGRESS NOTES
Chief Complaint Patient presents with  Follow-up Xrays and lab results 1. Have you been to the ER, urgent care clinic since your last visit? Hospitalized since your last visit? No 
 
2. Have you seen or consulted any other health care providers outside of the [address] [place] [address] since your last visit? Include any pap smears or colon screening. No  
 
HPI Jagruti Tanner comes in for follow-up care. She is a patient had seen 2 days ago in the clinic with malaise, fatigue and shortness of breath. Sent her for lab work and chest x-ray. She continues to have shortness of breath with exertion. After a short walk she has to stop and catch her breath. She is unable to complete sentences when she is talking due to shortness of breath. Her oxygenation is 90-91%. X-ray did show multifactorial opacities likely pneumonia. WBC was within normal.  She does not have any fever or chills. I am concerned about possible pulmonary emboli. I will refer her to the emergency room given the clinical condition in for evaluation. May just need antibiotics if there is no pulmonary emboli to treat for pneumonia. Past Medical History Past Medical History:  
Diagnosis Date  Depression  Hiatal hernia   
 causes heartburn occasionally Surgical History Past Surgical History:  
Procedure Laterality Date  HX HYSTEROSCOPY WITH ENDOMETRIAL ABLATION    
 HX KNEE ARTHROSCOPY Left  HX ORTHOPAEDIC Left   
 ankle  HX TUBAL LIGATION    
 KNEE SCOPE, ALLOGRAFT IMPANT Medications Current Outpatient Medications Medication Sig Dispense Refill  levoFLOXacin (LEVAQUIN) 500 mg tablet Take 1 Tab by mouth daily for 10 days. 10 Tab 0  
 BIOTIN PO Take  by mouth.  cholecalciferol (VITAMIN D3) 1,000 unit tablet Take  by mouth daily.  ascorbic acid, vitamin C, (VITAMIN C) 500 mg tablet Take  by mouth.     
 cyanocobalamin (VITAMIN B-12) 1,000 mcg tablet Take 1,000 mcg by mouth daily.    
 ZOLMitriptan (ZOMIG-ZMT) 5 mg disintegrating tablet One tab at HA onset, may repeat x1 > 2 hours if needed. Max 10 mg/24 hours 9 Tab 2 Allergies Allergies Allergen Reactions  Augmentin [Amoxicillin-Pot Clavulanate] Nausea and Vomiting Family History Family History Problem Relation Age of Onset  Cancer Mother  Colon Cancer Mother  Cancer Father  Colon Cancer Brother Social History Social History Socioeconomic History  Marital status:  Spouse name: Not on file  Number of children: Not on file  Years of education: Not on file  Highest education level: Not on file Social Needs  Financial resource strain: Not on file  Food insecurity - worry: Not on file  Food insecurity - inability: Not on file  Transportation needs - medical: Not on file  Transportation needs - non-medical: Not on file Occupational History  Not on file Tobacco Use  Smoking status: Never Smoker  Smokeless tobacco: Never Used Substance and Sexual Activity  Alcohol use: No  
 Drug use: No  
 Sexual activity: Not on file Other Topics Concern  Not on file Social History Narrative  Not on file Review of Systems Review of Systems -review of systems is negative except as noted above in the HPI. Vital Signs Visit Vitals /74 (BP 1 Location: Left arm, BP Patient Position: Sitting) Pulse 88 Temp 98.5 °F (36.9 °C) (Oral) Resp 20 Ht 5' 3\" (1.6 m) Wt 194 lb (88 kg) SpO2 93% BMI 34.37 kg/m² Physical Exam 
Physical Examination: General appearance - in mild to moderate distress and ill-appearing Mouth - mucous membranes moist, pharynx normal without lesions Chest - rales noted bilateral crackles scattered all over both lungs Heart - S1 and S2 normal, no murmurs noted Neurological - alert, oriented, normal speech, no focal findings or movement disorder noted, motor and sensory grossly normal bilaterally Results Results for orders placed or performed during the hospital encounter of 01/15/19 CBC WITH AUTOMATED DIFF Result Value Ref Range WBC 4.6 4.6 - 13.2 K/uL  
 RBC 4.56 4.20 - 5.30 M/uL  
 HGB 15.0 12.0 - 16.0 g/dL HCT 47.5 (H) 35.0 - 45.0 % .2 (H) 74.0 - 97.0 FL  
 MCH 32.9 24.0 - 34.0 PG  
 MCHC 31.6 31.0 - 37.0 g/dL  
 RDW 13.3 11.6 - 14.5 % PLATELET 250 882 - 818 K/uL MPV 11.3 9.2 - 11.8 FL  
 NEUTROPHILS 26 (L) 40 - 73 % LYMPHOCYTES 57 (H) 21 - 52 % MONOCYTES 16 (H) 3 - 10 % EOSINOPHILS 1 0 - 5 % BASOPHILS 0 0 - 2 %  
 ABS. NEUTROPHILS 1.2 (L) 1.8 - 8.0 K/UL  
 ABS. LYMPHOCYTES 2.7 0.9 - 3.6 K/UL  
 ABS. MONOCYTES 0.7 0.05 - 1.2 K/UL  
 ABS. EOSINOPHILS 0.0 0.0 - 0.4 K/UL  
 ABS. BASOPHILS 0.0 0.0 - 0.1 K/UL PLATELET COMMENTS ADEQUATE    
 RBC COMMENTS NORMOCYTIC, NORMOCHROMIC    
 DF MANUAL METABOLIC PANEL, COMPREHENSIVE Result Value Ref Range Sodium 137 136 - 145 mmol/L Potassium 3.5 3.5 - 5.5 mmol/L Chloride 97 (L) 100 - 108 mmol/L  
 CO2 32 21 - 32 mmol/L Anion gap 8 3.0 - 18 mmol/L Glucose 95 74 - 99 mg/dL BUN 10 7.0 - 18 MG/DL Creatinine 0.87 0.6 - 1.3 MG/DL  
 BUN/Creatinine ratio 11 (L) 12 - 20 GFR est AA >60 >60 ml/min/1.73m2 GFR est non-AA >60 >60 ml/min/1.73m2 Calcium 8.9 8.5 - 10.1 MG/DL Bilirubin, total 0.5 0.2 - 1.0 MG/DL  
 ALT (SGPT) 34 13 - 56 U/L  
 AST (SGOT) 68 (H) 15 - 37 U/L Alk. phosphatase 88 45 - 117 U/L Protein, total 7.6 6.4 - 8.2 g/dL Albumin 3.7 3.4 - 5.0 g/dL Globulin 3.9 2.0 - 4.0 g/dL A-G Ratio 0.9 0.8 - 1.7 TSH 3RD GENERATION Result Value Ref Range TSH 2.00 0.36 - 3.74 uIU/mL  
 
 
ASSESSMENT and PLAN 
  ICD-10-CM ICD-9-CM 1. Pneumonia due to infectious organism, unspecified laterality, unspecified part of lung J18.9 136.9 levoFLOXacin (LEVAQUIN) 500 mg tablet 484.8 2. SOB (shortness of breath) R06.02 786.05   
 
lab results and schedule of future lab studies reviewed with patient 
reviewed medications and side effects in detail 
radiology results and schedule of future radiology studies reviewed with patient I have discussed the diagnosis with the patient and the intended plan of care as seen in the above orders. The patient has received an after-visit summary and questions were answered concerning future plans. I have discussed medication, side effects, and warnings with the patient in detail. The patient verbalized understanding and is in agreement with the plan of care. The patient will contact the office with any additional concerns.  
 
Rosangela Newman MD

## 2019-02-04 ENCOUNTER — OFFICE VISIT (OUTPATIENT)
Dept: FAMILY MEDICINE CLINIC | Age: 54
End: 2019-02-04

## 2019-02-04 VITALS
HEIGHT: 63 IN | HEART RATE: 77 BPM | DIASTOLIC BLOOD PRESSURE: 78 MMHG | SYSTOLIC BLOOD PRESSURE: 127 MMHG | TEMPERATURE: 97.9 F | OXYGEN SATURATION: 94 % | WEIGHT: 199 LBS | RESPIRATION RATE: 18 BRPM | BODY MASS INDEX: 35.26 KG/M2

## 2019-02-04 DIAGNOSIS — M25.511 RIGHT SHOULDER PAIN, UNSPECIFIED CHRONICITY: ICD-10-CM

## 2019-02-04 DIAGNOSIS — J18.9 PNEUMONIA DUE TO INFECTIOUS ORGANISM, UNSPECIFIED LATERALITY, UNSPECIFIED PART OF LUNG: Primary | ICD-10-CM

## 2019-02-04 DIAGNOSIS — K44.9 HIATAL HERNIA: ICD-10-CM

## 2019-02-04 DIAGNOSIS — M21.962 DEFORMITY OF LEFT FOOT: ICD-10-CM

## 2019-02-04 NOTE — PROGRESS NOTES
Chief Complaint Patient presents with  
[facility] Follow Up  
  [facility] ER visit on 1/17/19  Shortness of Breath Symptoms still present but improving gradually 1. Have you been to the ER, urgent care clinic since your last visit? Hospitalized since your last visit? Yes, [facility] ER for migraine 1/10/19 and [facility] ER visit on 1/17/19 for shortness of breath and pneumonia. 2. Have you seen or consulted any other health care providers outside of the [address] [place] [address] [place] since your last visit? Include any pap smears or colon screening. Yes, podiatry appt on 1/12/19 HPI Gwendolyn Longoria comes in for follow-up care. Patient is a lady had seen in the clinic previously for shortness of breath. At the time had chest x-ray done that was suggestive of pneumonia. She was  having extreme shortness of breath when I saw her with exercise intolerance. Referred her to the emergency room to evaluate for pulmonary embolism. She did have a CTA chest that was negative for thromboembolism. It was however suggestive of pneumonia. She was put on antibiotic. Given doxycycline for 10 days. Has completed the dose. Has felt improved. She states that she no longer has malaise and fatigue. Feels her strength is coming back. Continues to have occasional shortness of breath but no wheezing, fever or chills. No nausea or vomiting. Appetite is improved. Overall she feels better. Discussed pneumonia. We will do a recheck chest x-ray in 3-4 weeks. In the meantime for now she will continue with the current treatment plan. Patient has a hiatal hernia. Does get hyperacidity on and off. She is on Prilosec. She will continue with this medication. She is scheduled to see the orthopedic physician for right shoulder and arm pain. She has difficulties doing overhead motions. I suspect rotator cuff syndrome. She will see the orthopedic physician tomorrow.   Patient also has a foot abnormality. She has a history of left foot pain and underwent Gastrocnemius recession, calcaneal osteotomy, repair of posterior tibial tendon with flexor transfer. Since then continues to have pain and deformity left foot. She is considering seeking an opinion from the orthopedic physician. She will let us know when she is ready for this. Past Medical History Past Medical History:  
Diagnosis Date  Depression  Hiatal hernia   
 causes heartburn occasionally Surgical History Past Surgical History:  
Procedure Laterality Date  HX HYSTEROSCOPY WITH ENDOMETRIAL ABLATION    
 HX KNEE ARTHROSCOPY Left  HX ORTHOPAEDIC Left   
 ankle  HX TUBAL LIGATION    
 KNEE SCOPE, ALLOGRAFT IMPANT Medications Current Outpatient Medications Medication Sig Dispense Refill  BIOTIN PO Take  by mouth.  cholecalciferol (VITAMIN D3) 1,000 unit tablet Take  by mouth daily.  ascorbic acid, vitamin C, (VITAMIN C) 500 mg tablet Take  by mouth.  cyanocobalamin (VITAMIN B-12) 1,000 mcg tablet Take 1,000 mcg by mouth daily.  ZOLMitriptan (ZOMIG-ZMT) 5 mg disintegrating tablet One tab at HA onset, may repeat x1 > 2 hours if needed. Max 10 mg/24 hours 9 Tab 2 Allergies Allergies Allergen Reactions  Augmentin [Amoxicillin-Pot Clavulanate] Nausea and Vomiting Family History Family History Problem Relation Age of Onset  Cancer Mother  Colon Cancer Mother  Cancer Father  Colon Cancer Brother Social History Social History Socioeconomic History  Marital status:  Spouse name: Not on file  Number of children: Not on file  Years of education: Not on file  Highest education level: Not on file Social Needs  Financial resource strain: Not on file  Food insecurity - worry: Not on file  Food insecurity - inability: Not on file  Transportation needs - medical: Not on file  Transportation needs - non-medical: Not on file Occupational History  Not on file Tobacco Use  Smoking status: Never Smoker  Smokeless tobacco: Never Used Substance and Sexual Activity  Alcohol use: No  
 Drug use: No  
 Sexual activity: Not on file Other Topics Concern  Not on file Social History Narrative  Not on file Review of Systems Review of Systems -review of all systems is negative except as noted above in the HPI. Vital Signs Visit Vitals /78 (BP 1 Location: Left arm, BP Patient Position: Sitting) Pulse 77 Temp 97.9 °F (36.6 °C) (Oral) Resp 18 Ht 5' 3\" (1.6 m) Wt 199 lb (90.3 kg) SpO2 94% BMI 35.25 kg/m² Physical Exam 
Physical Examination: General appearance - alert, well appearing, and in no distress, oriented to person, place, and time and acyanotic, in no respiratory distress Mental status - alert, oriented to person, place, and time, affect appropriate to mood Neck - supple, no significant adenopathy Lymphatics - no palpable lymphadenopathy Chest - clear to auscultation, no wheezes, rales or rhonchi, symmetric air entry Heart - normal rate and regular rhythm, S1 and S2 normal extremities Musculoskeletal -discomfort to palpation right lateral arm and the acromioclavicular joint area. Also discomfort along the rotator cuff tendon. Discomfort on doing overhead motions. Neurological - neck supple without rigidity Extremities - intact peripheral pulses Results Results for orders placed or performed during the hospital encounter of 01/15/19 CBC WITH AUTOMATED DIFF Result Value Ref Range WBC 4.6 4.6 - 13.2 K/uL  
 RBC 4.56 4.20 - 5.30 M/uL  
 HGB 15.0 12.0 - 16.0 g/dL HCT 47.5 (H) 35.0 - 45.0 % .2 (H) 74.0 - 97.0 FL  
 MCH 32.9 24.0 - 34.0 PG  
 MCHC 31.6 31.0 - 37.0 g/dL  
 RDW 13.3 11.6 - 14.5 % PLATELET 486 891 - 685 K/uL MPV 11.3 9.2 - 11.8 FL  
 NEUTROPHILS 26 (L) 40 - 73 % LYMPHOCYTES 57 (H) 21 - 52 % MONOCYTES 16 (H) 3 - 10 % EOSINOPHILS 1 0 - 5 % BASOPHILS 0 0 - 2 %  
 ABS. NEUTROPHILS 1.2 (L) 1.8 - 8.0 K/UL  
 ABS. LYMPHOCYTES 2.7 0.9 - 3.6 K/UL  
 ABS. MONOCYTES 0.7 0.05 - 1.2 K/UL  
 ABS. EOSINOPHILS 0.0 0.0 - 0.4 K/UL  
 ABS. BASOPHILS 0.0 0.0 - 0.1 K/UL PLATELET COMMENTS ADEQUATE    
 RBC COMMENTS NORMOCYTIC, NORMOCHROMIC    
 DF MANUAL METABOLIC PANEL, COMPREHENSIVE Result Value Ref Range Sodium 137 136 - 145 mmol/L Potassium 3.5 3.5 - 5.5 mmol/L Chloride 97 (L) 100 - 108 mmol/L  
 CO2 32 21 - 32 mmol/L Anion gap 8 3.0 - 18 mmol/L Glucose 95 74 - 99 mg/dL BUN 10 7.0 - 18 MG/DL Creatinine 0.87 0.6 - 1.3 MG/DL  
 BUN/Creatinine ratio 11 (L) 12 - 20 GFR est AA >60 >60 ml/min/1.73m2 GFR est non-AA >60 >60 ml/min/1.73m2 Calcium 8.9 8.5 - 10.1 MG/DL Bilirubin, total 0.5 0.2 - 1.0 MG/DL  
 ALT (SGPT) 34 13 - 56 U/L  
 AST (SGOT) 68 (H) 15 - 37 U/L Alk. phosphatase 88 45 - 117 U/L Protein, total 7.6 6.4 - 8.2 g/dL Albumin 3.7 3.4 - 5.0 g/dL Globulin 3.9 2.0 - 4.0 g/dL A-G Ratio 0.9 0.8 - 1.7 TSH 3RD GENERATION Result Value Ref Range TSH 2.00 0.36 - 3.74 uIU/mL  
 
 
ASSESSMENT and PLAN 
  ICD-10-CM ICD-9-CM 1. Pneumonia due to infectious organism, unspecified laterality, unspecified part of lung J18.9 136.9 XR CHEST PA LAT  
  484.8 2. Hiatal hernia K44.9 553.3 3. Deformity of left foot M21.962 736.70   
4. Right shoulder pain, unspecified chronicity M25.511 719.41   
 
reviewed diet, exercise and weight control 
reviewed medications and side effects in detail 
radiology results and schedule of future radiology studies reviewed with patient I have discussed the diagnosis with the patient and the intended plan of care as seen in the above orders. The patient has received an after-visit summary and questions were answered concerning future plans.  I have discussed medication, side effects, and warnings with the patient in detail. The patient verbalized understanding and is in agreement with the plan of care. The patient will contact the office with any additional concerns.  
 
Jessica Allison MD

## 2019-02-05 ENCOUNTER — OFFICE VISIT (OUTPATIENT)
Dept: ORTHOPEDIC SURGERY | Age: 54
End: 2019-02-05

## 2019-02-05 VITALS
WEIGHT: 199 LBS | HEIGHT: 63 IN | HEART RATE: 82 BPM | SYSTOLIC BLOOD PRESSURE: 134 MMHG | OXYGEN SATURATION: 99 % | BODY MASS INDEX: 35.26 KG/M2 | DIASTOLIC BLOOD PRESSURE: 83 MMHG

## 2019-02-05 DIAGNOSIS — M75.52 SUBACROMIAL BURSITIS OF LEFT SHOULDER JOINT: Primary | ICD-10-CM

## 2019-02-05 DIAGNOSIS — M25.512 LEFT SHOULDER PAIN, UNSPECIFIED CHRONICITY: ICD-10-CM

## 2019-02-05 RX ORDER — MELOXICAM 15 MG/1
15 TABLET ORAL
Qty: 30 TAB | Refills: 1 | Status: CANCELLED | OUTPATIENT
Start: 2019-02-05

## 2019-02-05 RX ORDER — CELECOXIB 200 MG/1
200 CAPSULE ORAL 2 TIMES DAILY WITH MEALS
Qty: 60 CAP | Refills: 0 | Status: SHIPPED | OUTPATIENT
Start: 2019-02-05 | End: 2020-07-09

## 2019-02-05 RX ORDER — TRIAMCINOLONE ACETONIDE 40 MG/ML
40 INJECTION, SUSPENSION INTRA-ARTICULAR; INTRAMUSCULAR ONCE
Qty: 1 ML | Refills: 0
Start: 2019-02-05 | End: 2019-02-05

## 2019-02-05 NOTE — PROGRESS NOTES
Jie Pyle  1965   Chief Complaint   Patient presents with    Shoulder Pain     bilateral shoulder pain        HISTORY OF PRESENT ILLNESS  Jie Pyle is a 48 y.o. female who presents today for reevaluation of b/l shoulder pain. Patient rates pain as 8/10 today. The pain has been present for 2 weeks after moving some things around. She has tried Bengay cream for the pain. The R>L. She denies night pain. The pain is only present with overhead reaching. She has not taken any medication for the issue. Patient denies any fever, chills, chest pain, shortness of breath or calf pain. The remainder of the review of systems is negative. There are no new illness or injuries to report since last seen in the office. There are no changes to medications, allergies, family or social history. PHYSICAL EXAM:   Visit Vitals  /83   Pulse 82   Ht 5' 3\" (1.6 m)   Wt 199 lb (90.3 kg)   SpO2 99%   BMI 35.25 kg/m²     The patient is a well-developed, well-nourished female   in no acute distress. The patient is alert and oriented times three. The patient is alert and oriented times three. Mood and affect are normal.  LYMPHATIC: lymph nodes are not enlarged and are within normal limits  SKIN: normal in color and non tender to palpation. There are no bruises or abrasions noted. NEUROLOGICAL: Motor sensory exam is within normal limits. Reflexes are equal bilaterally.  There is normal sensation to pinprick and light touch  MUSCULOSKELETAL:  Examination Left shoulder Right shoulder   Skin Intact Intact   AC joint tenderness - -   Biceps tenderness - -   Forward flexion/Elevation  180   Active abduction  160   Glenohumeral abduction 90 90   External rotation ROM 30 90   30 70 70   Apprehension - -   Angelas Relocation - -   Jerk - -   Load and Shift - -   Obriens - -   Speeds - -   Impingement sign + -   Supraspinatus/Empty Can -, 5/5 -, 5/5   External Rotation Strength -, 5/5 -, 5/5   Lift Off/Belly Press -, 5/5 -, 5/5   Neurovascular Intact Intact     LeftPROCEDURE: Left Shoulder Injection with Ultrasound Guidance  Indication:Left Shoulder pain/swelling    After sterile prep, 6 cc of Xylocaine and 1 cc of Kenalog were injected into the left shoulder. Ultrasound images captured using [address] Ultrasound machine and scanned into patient's chart. [facility]  OFFICE PROCEDURE PROGRESS NOTE        Chart reviewed for the following:  Ganga Snow M.D, have reviewed the History, Physical and updated the Allergic reactions for [address] [place] [address] Drive performed immediately prior to start of procedure:  Ganga Snow M.D, have performed the following reviews on [employer] [employer] prior to the start of the procedure:            * Patient was identified by name and date of birth   * Agreement on procedure being performed was verified  * Risks and Benefits explained to the patient  * Procedure site verified and marked as necessary  * Patient was positioned for comfort  * Consent was signed and verified     Time: 2:31 PM     Date of procedure: 2/5/2019    Procedure performed by:  Urszula Snyder M.D    Provider assisted by: (see medication administration)    How tolerated by patient: tolerated the procedure well with no complications    Comments: none      IMAGING: XR of left shoulder dated 2/5/19 was reviewed and read: Sclerotic changes in the greater tuberosity. Type III acromion. IMPRESSION:      ICD-10-CM ICD-9-CM    1. Subacromial bursitis of left shoulder joint M75.52 726.19 TRIAMCINOLONE ACETONIDE INJ      triamcinolone acetonide (KENALOG) 40 mg/mL injection      US GUIDE INJ/ASP/ARTHRO LG JNT/BURSA      celecoxib (CELEBREX) 200 mg capsule   2.  Left shoulder pain, unspecified chronicity M25.512 719.41 AMB POC XRAY, SHOULDER; COMPLETE, 2+      TRIAMCINOLONE ACETONIDE INJ      triamcinolone acetonide (KENALOG) 40 mg/mL injection      US GUIDE INJ/ASP/ARTHRO LG JNT/BURSA      celecoxib (CELEBREX) 200 mg capsule        PLAN:   1. The patient presents today with left shoulder pain and I would like to try a cortisone injection today. Risk factors include: n/a  2. No ultrasound exam indicated today  3. Yes cortisone injection indicated today L SHOULDER US  4. No Physical/Occupational Therapy indicated today  5. No diagnostic test indicated today:   6. No durable medical equipment indicated today  7. No referral indicated today   8. Yes medications indicated today: CELEBREX  9. No Narcotic indicated today       RTC 4 weeks  Follow-up Disposition: Not on File    Scribed by Nazanin Wang [facility]) as dictated by Shaun Velazquez MD    I, Dr. Shaun Velazquez, confirm that all documentation is accurate.     Shaun Velazquez M.D.   [facility]

## 2019-02-06 DIAGNOSIS — R06.02 SOB (SHORTNESS OF BREATH): ICD-10-CM

## 2019-02-13 LAB — MAMMOGRAPHY, EXTERNAL: NORMAL

## 2019-02-26 ENCOUNTER — ANESTHESIA EVENT (OUTPATIENT)
Dept: ENDOSCOPY | Age: 54
End: 2019-02-26
Payer: COMMERCIAL

## 2019-02-27 ENCOUNTER — HOSPITAL ENCOUNTER (OUTPATIENT)
Age: 54
Setting detail: OUTPATIENT SURGERY
Discharge: HOME | End: 2019-02-27
Attending: COLON & RECTAL SURGERY | Admitting: COLON & RECTAL SURGERY
Payer: COMMERCIAL

## 2019-02-27 ENCOUNTER — ANESTHESIA (OUTPATIENT)
Dept: ENDOSCOPY | Age: 54
End: 2019-02-27
Payer: COMMERCIAL

## 2019-02-27 VITALS
SYSTOLIC BLOOD PRESSURE: 104 MMHG | HEIGHT: 63 IN | RESPIRATION RATE: 18 BRPM | HEART RATE: 58 BPM | OXYGEN SATURATION: 100 % | DIASTOLIC BLOOD PRESSURE: 62 MMHG | WEIGHT: 200 LBS | TEMPERATURE: 98.1 F | BODY MASS INDEX: 35.44 KG/M2

## 2019-02-27 LAB — HCG UR QL: NEGATIVE

## 2019-02-27 PROCEDURE — 76060000032 HC ANESTHESIA 0.5 TO 1 HR: Performed by: COLON & RECTAL SURGERY

## 2019-02-27 PROCEDURE — 74011250636 HC RX REV CODE- 250/636: Performed by: NURSE ANESTHETIST, CERTIFIED REGISTERED

## 2019-02-27 PROCEDURE — 76040000019: Performed by: COLON & RECTAL SURGERY

## 2019-02-27 PROCEDURE — 81025 URINE PREGNANCY TEST: CPT

## 2019-02-27 PROCEDURE — 74011250636 HC RX REV CODE- 250/636

## 2019-02-27 RX ORDER — LIDOCAINE HYDROCHLORIDE 10 MG/ML
0.1 INJECTION, SOLUTION EPIDURAL; INFILTRATION; INTRACAUDAL; PERINEURAL AS NEEDED
Status: DISCONTINUED | OUTPATIENT
Start: 2019-02-27 | End: 2019-02-27 | Stop reason: HOSPADM

## 2019-02-27 RX ORDER — SODIUM CHLORIDE 0.9 % (FLUSH) 0.9 %
5-40 SYRINGE (ML) INJECTION AS NEEDED
Status: DISCONTINUED | OUTPATIENT
Start: 2019-02-27 | End: 2019-02-27 | Stop reason: HOSPADM

## 2019-02-27 RX ORDER — SODIUM CHLORIDE, SODIUM LACTATE, POTASSIUM CHLORIDE, CALCIUM CHLORIDE 600; 310; 30; 20 MG/100ML; MG/100ML; MG/100ML; MG/100ML
INJECTION, SOLUTION INTRAVENOUS
Status: DISCONTINUED | OUTPATIENT
Start: 2019-02-27 | End: 2019-02-27 | Stop reason: HOSPADM

## 2019-02-27 RX ORDER — LIDOCAINE HYDROCHLORIDE 20 MG/ML
INJECTION, SOLUTION EPIDURAL; INFILTRATION; INTRACAUDAL; PERINEURAL AS NEEDED
Status: DISCONTINUED | OUTPATIENT
Start: 2019-02-27 | End: 2019-02-27 | Stop reason: HOSPADM

## 2019-02-27 RX ORDER — SODIUM CHLORIDE, SODIUM LACTATE, POTASSIUM CHLORIDE, CALCIUM CHLORIDE 600; 310; 30; 20 MG/100ML; MG/100ML; MG/100ML; MG/100ML
75 INJECTION, SOLUTION INTRAVENOUS CONTINUOUS
Status: DISCONTINUED | OUTPATIENT
Start: 2019-02-27 | End: 2019-02-27 | Stop reason: HOSPADM

## 2019-02-27 RX ORDER — PROPOFOL 10 MG/ML
INJECTION, EMULSION INTRAVENOUS AS NEEDED
Status: DISCONTINUED | OUTPATIENT
Start: 2019-02-27 | End: 2019-02-27 | Stop reason: HOSPADM

## 2019-02-27 RX ORDER — SODIUM CHLORIDE 0.9 % (FLUSH) 0.9 %
5-40 SYRINGE (ML) INJECTION EVERY 8 HOURS
Status: DISCONTINUED | OUTPATIENT
Start: 2019-02-27 | End: 2019-02-27 | Stop reason: HOSPADM

## 2019-02-27 RX ORDER — INSULIN LISPRO 100 [IU]/ML
INJECTION, SOLUTION INTRAVENOUS; SUBCUTANEOUS ONCE
Status: DISCONTINUED | OUTPATIENT
Start: 2019-02-27 | End: 2019-02-27 | Stop reason: HOSPADM

## 2019-02-27 RX ADMIN — LIDOCAINE HYDROCHLORIDE 80 MG: 20 INJECTION, SOLUTION EPIDURAL; INFILTRATION; INTRACAUDAL; PERINEURAL at 12:14

## 2019-02-27 RX ADMIN — FAMOTIDINE 20 MG: 10 INJECTION, SOLUTION INTRAVENOUS at 11:25

## 2019-02-27 RX ADMIN — SODIUM CHLORIDE, SODIUM LACTATE, POTASSIUM CHLORIDE, CALCIUM CHLORIDE: 600; 310; 30; 20 INJECTION, SOLUTION INTRAVENOUS at 11:46

## 2019-02-27 RX ADMIN — PROPOFOL 150 MG: 10 INJECTION, EMULSION INTRAVENOUS at 12:15

## 2019-02-27 NOTE — ANESTHESIA POSTPROCEDURE EVALUATION
Procedure(s): 
COLONOSCOPY. Anesthesia Post Evaluation Multimodal analgesia: multimodal analgesia used between 6 hours prior to anesthesia start to PACU discharge Patient location during evaluation: bedside Patient participation: complete - patient participated Level of consciousness: awake Pain score: 0 Pain management: adequate Airway patency: patent Anesthetic complications: no 
Cardiovascular status: stable Respiratory status: acceptable Hydration status: acceptable Post anesthesia nausea and vomiting:  none Visit Vitals /90 Pulse 69 Temp 36.7 °C (98.1 °F) Resp 20 Ht 5' 3\" (1.6 m) Wt 90.7 kg (200 lb) SpO2 100% Breastfeeding? No  
BMI 35.43 kg/m²

## 2019-02-27 NOTE — ANESTHESIA PREPROCEDURE EVALUATION
Anesthetic History No history of anesthetic complications Review of Systems / Medical History Patient summary reviewed and pertinent labs reviewed Pulmonary Within defined limits Neuro/Psych Headaches Cardiovascular Within defined limits Exercise tolerance: >4 METS 
  
GI/Hepatic/Renal 
Within defined limits Endo/Other Arthritis Other Findings Physical Exam 
 
Airway Mallampati: I 
TM Distance: 4 - 6 cm Neck ROM: normal range of motion Mouth opening: Normal 
 
 Cardiovascular Regular rate and rhythm,  S1 and S2 normal,  no murmur, click, rub, or gallop Rhythm: regular Rate: normal 
 
 
 
 Dental 
 
Dentition: Lower dentition intact and Upper dentition intact Pulmonary Breath sounds clear to auscultation Abdominal 
GI exam deferred Other Findings Anesthetic Plan ASA: 2 Anesthesia type: MAC Induction: Intravenous Anesthetic plan and risks discussed with: Patient

## 2019-02-27 NOTE — H&P
HPI: Curt Gomez is a 48 y.o. female presenting with chief complain of [facility] of colorectal cancer Past Medical History:  
Diagnosis Date  Depression  Hiatal hernia   
 causes heartburn occasionally Past Surgical History:  
Procedure Laterality Date  HX ANKLE FRACTURE TX    
 HX HYSTEROSCOPY WITH ENDOMETRIAL ABLATION    
 HX KNEE ARTHROSCOPY Left  HX ORTHOPAEDIC Left   
 ankle  HX TUBAL LIGATION    
 KNEE SCOPE, ALLOGRAFT IMPANT Family History Problem Relation Age of Onset  Cancer Mother  Colon Cancer Mother  Cancer Father  Colon Cancer Brother Social History Socioeconomic History  Marital status:  Spouse name: Not on file  Number of children: Not on file  Years of education: Not on file  Highest education level: Not on file Tobacco Use  Smoking status: Never Smoker  Smokeless tobacco: Never Used Substance and Sexual Activity  Alcohol use: No  
 Drug use: No  
 
 
Review of Systems - neg Allergies Allergen Reactions  Augmentin [Amoxicillin-Pot Clavulanate] Nausea and Vomiting Vitals:  
 02/26/19 1425 02/27/19 1108 BP:  125/58 Pulse:  69 Resp:  19 Temp:  98.1 °F (36.7 °C) SpO2:  100% Weight: 90.7 kg (200 lb) Height: 5' 3\" (1.6 m) Physical Exam  
Constitutional: She appears well-developed and well-nourished. HENT:  
Head: Normocephalic and atraumatic. Eyes: Conjunctivae and EOM are normal.  
Abdominal: Soft. She exhibits no distension. There is no tenderness. Musculoskeletal: Normal range of motion. Lymphadenopathy:  
  She has no cervical adenopathy. Right: No inguinal adenopathy present. Left: No inguinal adenopathy present. Neurological: She exhibits normal muscle tone. Skin: Skin is warm and dry. No rash noted. Psychiatric: She has a normal mood and affect. Her speech is normal.  
 
 
Assessment / Plan 
 
colonoscopy The diagnoses and plan were discussed with the patient. All questions answered. Plan of care agreed to by all concerned.

## 2019-02-27 NOTE — PROCEDURES
[facility]  [address] [facility], [address]   Andriy mcgrath, [address]  [phone]                    Colonoscopy Procedure Note      Donald Boyle  1965  [id number]                Date of Procedure: 2/27/2019    Preoperative diagnosis: Z12.11,  Colon cancer Screening, [facility] of colorectal cancer    Postoperative diagnosis: Normal    :  Andres Craven MD    Assistant(s): Endoscopy Technician-1: Antwan Poon  Endoscopy RN-1: Miles Sarmiento RN; Bradford Florence RN    Sedation: MAC    Complications: None    Implants: None    Procedure Details:  Prior to the procedure, a history and physical were performed. The patients medications, allergies and sensitivities were reviewed and all questions were answered. After informed consent was obtained for the procedure, with all risks and benefits of procedure explained. The patient was taken to the endoscopy suite and placed in the left lateral decubitus position. Patient identification and proposed procedure were verified prior to the procedure by the nurse and I. After sequential anesthesia administered by anesthesiologist, a digital rectal exam was performed and was normal.  The Olympus video colonoscope was introduced through the anus and advanced to cecum, which was identified by the ileocecal valve and appendiceal orifice. The quality of preparation was good. The colonoscope was slowly withdrawn and the mucosa examined for any abnormalities. Cecal withdrawal time was greater than 6 minutes. The patient tolerated the procedure well. There were no complications. Findings/Interventions:   Polyps - none    EBL: none    Recommendations: -Repeat colonoscopy in 3 years given strong [facility]. Resume normal medication(s).      Discharge Disposition:  Krystal Peterson MD  2/27/2019  12:38 PM
none

## 2019-02-27 NOTE — DISCHARGE INSTRUCTIONS
From Dr. Harman Goodell: Repeat Colonoscopy in 3 Years. Colonoscopy: What to Expect at [address] [place] [address]  After you have a colonoscopy, you will stay at the clinic for 1 to 2 hours until the medicines wear off. Then you can go home. But you will need to arrange for a ride. Your doctor will tell you when you can eat and do your other usual activities. Your doctor will talk to you about when you will need your next colonoscopy. Your doctor can help you decide how often you need to be checked. This will depend on the results of your test and your risk for colorectal cancer. After the test, you may be bloated or have gas pains. You may need to pass gas. If a biopsy was done or a polyp was removed, you may have streaks of blood in your stool (feces) for a few days. This care sheet gives you a general idea about how long it will take for you to recover. But each person recovers at a different pace. Follow the steps below to get better as quickly as possible. How can you care for yourself at home? Activity  · Rest when you feel tired. · You can do your normal activities when it feels okay to do so. Diet  · Follow your doctor's directions for eating. · Unless your doctor has told you not to, drink plenty of fluids. This helps to replace the fluids that were lost during the colon prep. · Do not drink alcohol. Medicines  · If polyps were removed or a biopsy was done during the test, your doctor may tell you not to take aspirin or other anti-inflammatory medicines for a few days. These include ibuprofen (Advil, Motrin) and naproxen (Aleve). Other instructions  · For your safety, do not drive or operate machinery until the medicine wears off and you can think clearly. Your doctor may tell you not to drive or operate machinery until the day after your test.  · Do not sign legal documents or make major decisions until the medicine wears off and you can think clearly.  The anesthesia can make it hard for you to fully understand what you are agreeing to. Follow-up care is a key part of your treatment and safety. Be sure to make and go to all appointments, and call your doctor if you are having problems. It's also a good idea to know your test results and keep a list of the medicines you take. When should you call for help? Call 911 anytime you think you may need emergency care. For example, call if:  · You passed out (lost consciousness). · You pass maroon or bloody stools. · You have severe belly pain. Call your doctor now or seek immediate medical care if:  · Your stools are black and tarlike. · Your stools have streaks of blood, but you did not have a biopsy or any polyps removed. · You have belly pain, or your belly is swollen and firm. · You vomit. · You have a fever. · You are very dizzy. Watch closely for changes in your health, and be sure to contact your doctor if you have any problems. Where can you learn more? Go to Marqui.[employer]  Enter E264 in the search box to learn more about \"Colonoscopy: What to Expect at Home. \"   © 4761-6136 [employer]. Care instructions adapted under license by [employer] (which disclaims liability or warranty for this information). This care instruction is for use with your licensed healthcare professional. If you have questions about a medical condition or this instruction, always ask your healthcare professional. [place] [address] any warranty or liability for your use of this information. Content Version: 82.0.138179; Current as of: November 14, 2014      DISCHARGE SUMMARY from Nurse     POST-PROCEDURE INSTRUCTIONS:    Call your Physician if you:  ? Observe any excess bleeding. ? Develop a temperature over 100.5o F.  ? Experience abdominal, shoulder or chest pain. ?  Notice any signs of decreased circulation or nerve impairment to an extremity such as a change in color, persistent numbness, tingling, coldness or increase in pain. ? Vomit blood or you have nausea and vomiting lasting longer than 4 hours. ? Are unable to take medications. ? Are unable to urinate within 8 hours after discharge following general anesthesia or intravenous sedation. For the next 24 hours after receiving general anesthesia or intravenous sedation, or while taking prescription Narcotics, limit your activities:  ? Do NOT drive a motor vehicle, operate hazard machinery or power tools, or perform tasks that require coordination. The medication you received during your procedure may have some effect on your mental awareness. ? Do NOT make important personal or business decisions. The medication you received during your procedure may have some effect on your mental awareness. ? Do NOT drink alcoholic beverages. These drinks do not mix well with the medications that have been given to you. ? Upon discharge from the hospital, you must be accompanied by a responsible adult. ? Resume your diet as directed by your physician. ? Resume medications as your physician has prescribed. ? Please give a list of your current medications to your Primary Care Provider. ? Please update this list whenever your medications are discontinued, doses are changed, or new medications (including over-the-counter products) are added. ? Please carry medication information at all times in case of emergency situations. These are general instructions for a healthy lifestyle:    No smoking/ No tobacco products/ Avoid exposure to second hand smoke.  Surgeon General's Warning:  Quitting smoking now greatly reduces serious risk to your health. Obesity, smoking, and a sedentary lifestyle greatly increase your risk for illness.    A healthy diet, regular physical exercise & weight monitoring are important for maintaining a healthy lifestyle   You may be retaining fluid if you have a history of heart failure or if you experience any of the following symptoms:  Weight gain of 3 pounds or more overnight or 5 pounds in a week, increased swelling in our hands or feet or shortness of breath while lying flat in bed. Please call your doctor as soon as you notice any of these symptoms; do not wait until your next office visit. Recognize signs and symptoms of STROKE:  F  -  Face looks uneven  A  -  Arms unable to move or move unevenly  S  -  Speech slurred or non-existent  T  -  Time to call 911 - as soon as signs and symptoms begin - DO NOT go back to bed or wait to see If you get better - TIME IS BRAIN. Colorectal Screening   Colorectal cancer almost always develops from precancerous polyps (abnormal growths) in the colon or rectum. Screening tests can find precancerous polyps, so that they can be removed before they turn into cancer. Screening tests can also find colorectal cancer early, when treatment works best.  [facility] Speak with your physician about when you should begin screening and how often you should be tested. Additional Information    If you have questions, please call [phone]. Remember, [facility] is NOT to be used for urgent needs. For medical emergencies, dial 911. Educational references and/or instructions provided during this visit included:    Colonoscopy      APPOINTMENTS:    Please make a follow-up appointment with your physician. Discharge information has been reviewed with the patient and family member. The patient and member verbalized understanding.

## 2019-02-28 ENCOUNTER — PATIENT OUTREACH (OUTPATIENT)
Dept: FAMILY MEDICINE CLINIC | Age: 54
End: 2019-02-28

## 2019-02-28 NOTE — PROGRESS NOTES
[facility] health screening:    Colonoscopy completed yesterday with surveillance recommendation of 3 yrs. I've requested [facility] staff to please update report in [facility]. Closed this episode of care.

## 2020-07-09 ENCOUNTER — HOSPITAL ENCOUNTER (OUTPATIENT)
Dept: LAB | Age: 55
Discharge: HOME | End: 2020-07-09
Payer: COMMERCIAL

## 2020-07-09 ENCOUNTER — OFFICE VISIT (OUTPATIENT)
Dept: FAMILY MEDICINE CLINIC | Age: 55
End: 2020-07-09

## 2020-07-09 VITALS
WEIGHT: 210 LBS | DIASTOLIC BLOOD PRESSURE: 91 MMHG | BODY MASS INDEX: 37.21 KG/M2 | OXYGEN SATURATION: 96 % | SYSTOLIC BLOOD PRESSURE: 152 MMHG | TEMPERATURE: 98.5 F | HEART RATE: 72 BPM | RESPIRATION RATE: 16 BRPM | HEIGHT: 63 IN

## 2020-07-09 DIAGNOSIS — E55.9 HYPOVITAMINOSIS D: ICD-10-CM

## 2020-07-09 DIAGNOSIS — R06.02 SOB (SHORTNESS OF BREATH): ICD-10-CM

## 2020-07-09 DIAGNOSIS — K21.00 GASTROESOPHAGEAL REFLUX DISEASE WITH ESOPHAGITIS: ICD-10-CM

## 2020-07-09 DIAGNOSIS — E66.9 OBESITY (BMI 30-39.9): ICD-10-CM

## 2020-07-09 DIAGNOSIS — R73.9 HYPERGLYCEMIA: ICD-10-CM

## 2020-07-09 DIAGNOSIS — G43.909 MIGRAINE WITHOUT STATUS MIGRAINOSUS, NOT INTRACTABLE, UNSPECIFIED MIGRAINE TYPE: ICD-10-CM

## 2020-07-09 DIAGNOSIS — Z00.00 GENERAL MEDICAL EXAM: Primary | ICD-10-CM

## 2020-07-09 DIAGNOSIS — Z13.31 SCREENING FOR DEPRESSION: ICD-10-CM

## 2020-07-09 DIAGNOSIS — R03.0 ELEVATED BP WITHOUT DIAGNOSIS OF HYPERTENSION: ICD-10-CM

## 2020-07-09 DIAGNOSIS — Z12.31 ENCOUNTER FOR SCREENING MAMMOGRAM FOR MALIGNANT NEOPLASM OF BREAST: ICD-10-CM

## 2020-07-09 DIAGNOSIS — Z00.00 GENERAL MEDICAL EXAM: ICD-10-CM

## 2020-07-09 PROCEDURE — 80053 COMPREHEN METABOLIC PANEL: CPT

## 2020-07-09 PROCEDURE — 85025 COMPLETE CBC W/AUTO DIFF WBC: CPT

## 2020-07-09 PROCEDURE — 82306 VITAMIN D 25 HYDROXY: CPT

## 2020-07-09 PROCEDURE — 80061 LIPID PANEL: CPT

## 2020-07-09 PROCEDURE — 83036 HEMOGLOBIN GLYCOSYLATED A1C: CPT

## 2020-07-09 RX ORDER — VALACYCLOVIR HYDROCHLORIDE 500 MG/1
TABLET, FILM COATED ORAL
COMMUNITY
Start: 2020-05-13

## 2020-07-09 NOTE — PATIENT INSTRUCTIONS
DASH Diet: Care Instructions Your Care Instructions The DASH diet is an eating plan that can help lower your blood pressure. DASH stands for Dietary Approaches to Stop Hypertension. Hypertension is high blood pressure. The DASH diet focuses on eating foods that are high in calcium, potassium, and magnesium. These nutrients can lower blood pressure. The foods that are highest in these nutrients are fruits, vegetables, low-fat dairy products, nuts, seeds, and legumes. But taking calcium, potassium, and magnesium supplements instead of eating foods that are high in those nutrients does not have the same effect. The DASH diet also includes whole grains, fish, and poultry. The DASH diet is one of several lifestyle changes your doctor may recommend to lower your high blood pressure. Your doctor may also want you to decrease the amount of sodium in your diet. Lowering sodium while following the DASH diet can lower blood pressure even further than just the DASH diet alone. Follow-up care is a key part of your treatment and safety. Be sure to make and go to all appointments, and call your doctor if you are having problems. It's also a good idea to know your test results and keep a list of the medicines you take. How can you care for yourself at home? Following the DASH diet · Eat 4 to 5 servings of fruit each day. A serving is 1 medium-sized piece of fruit, ½ cup chopped or canned fruit, 1/4 cup dried fruit, or 4 ounces (½ cup) of fruit juice. Choose fruit more often than fruit juice. · Eat 4 to 5 servings of vegetables each day. A serving is 1 cup of lettuce or raw leafy vegetables, ½ cup of chopped or cooked vegetables, or 4 ounces (½ cup) of vegetable juice. Choose vegetables more often than vegetable juice. · Get 2 to 3 servings of low-fat and fat-free dairy each day. A serving is 8 ounces of milk, 1 cup of yogurt, or 1 ½ ounces of cheese. · Eat 6 to 8 servings of grains each day. A serving is 1 slice of bread, 1 ounce of dry cereal, or ½ cup of cooked rice, pasta, or cooked cereal. Try to choose whole-grain products as much as possible. · Limit lean meat, poultry, and fish to 2 servings each day. A serving is 3 ounces, about the size of a deck of cards. · Eat 4 to 5 servings of nuts, seeds, and legumes (cooked dried beans, lentils, and split peas) each week. A serving is 1/3 cup of nuts, 2 tablespoons of seeds, or ½ cup of cooked beans or peas. · Limit fats and oils to 2 to 3 servings each day. A serving is 1 teaspoon of vegetable oil or 2 tablespoons of salad dressing. · Limit sweets and added sugars to 5 servings or less a week. A serving is 1 tablespoon jelly or jam, ½ cup sorbet, or 1 cup of lemonade. · Eat less than 2,300 milligrams (mg) of sodium a day. If you limit your sodium to 1,500 mg a day, you can lower your blood pressure even more. Tips for success · Start small. Do not try to make dramatic changes to your diet all at once. You might feel that you are missing out on your favorite foods and then be more likely to not follow the plan. Make small changes, and stick with them. Once those changes become habit, add a few more changes. · Try some of the following: ? Make it a goal to eat a fruit or vegetable at every meal and at snacks. This will make it easy to get the recommended amount of fruits and vegetables each day. ? Try yogurt topped with fruit and nuts for a snack or healthy dessert. ? Add lettuce, tomato, cucumber, and onion to sandwiches. ? Combine a ready-made pizza crust with low-fat mozzarella cheese and lots of vegetable toppings. Try using tomatoes, squash, spinach, broccoli, carrots, cauliflower, and onions. ? Have a variety of cut-up vegetables with a low-fat dip as an appetizer instead of chips and dip. ? Sprinkle sunflower seeds or chopped almonds over salads.  Or try adding chopped walnuts or almonds to cooked vegetables. ? Try some vegetarian meals using beans and peas. Add garbanzo or kidney beans to salads. Make burritos and tacos with mashed lozada beans or black beans. Where can you learn more? Go to http://www.gray.com/ Enter U490 in the search box to learn more about \"DASH Diet: Care Instructions. \" Current as of: December 16, 2019               Content Version: 12.5 © 2384-7117 [employer]. Care instructions adapted under license by [employer] (which disclaims liability or warranty for this information). If you have questions about a medical condition or this instruction, always ask your healthcare professional. [address] any warranty or liability for your use of this information.

## 2020-07-09 NOTE — PROGRESS NOTES
Chief Complaint   Patient presents with    Complete Physical    Weight Management     1. Have you been to the ER, urgent care clinic since your last visit? Hospitalized since your last visit? No    2. Have you seen or consulted any other health care providers outside of the [address] [place] [address] since your last visit? Include any pap smears or colon screening. No     HPI  Yamilet Rojo comes in for complete physical exam.  Shortness of breath: Patient has occasional shortness of breath on and off. States that this has been present since she was noted to have pneumonia last year. She does feel like she is not getting enough breath in. Wonders if this is due to her hiatal hernia. Denies cough, wheeze, chest pain or hemoptysis. I will do a recheck x-ray. Patient denies having allergies. She denies nasal congestion or sneezing. GERD: Patient has heartburn that comes on and off. She takes over-the-counter medication for this. Denies dark stools or hematemesis. This seems to help with heartburn. She has a history of hiatal hernia. If symptoms persist we will refer her to see the gastroenterologist.  Obesity: Patient has a BMI of 37.20. She has been doing lifestyle and dietary modification. Continues to gain weight. She declines to be seen in the bariatric clinic or to see a dietitian. We discussed medication options to help with weight loss. She would prefer to hold off on this for now. Elevated blood pressure: Patient has elevated blood pressure. States that her blood pressure is usually within normal.  She has a slight headache. No changes in vision or focal weakness. Does not want to take any blood pressure lowering medication for now. She will try the DASH diet. I will follow-up at next visit. Headache: Patient has a history of migraine headache. She is on Zomig that she takes as needed. She will continue with the current treatment plan.   Mood disorder: Patient has a history of depression. Currently feels stable. PHQ 9 score is 9. She will continue with supportive care measures. Hypovitaminosis D: Patient has history of hypovitaminosis D. We will recheck labs today. Hypoglycemia: Patient has a history of hypoglycemia. She is concerned about diabetes. I will check a HbA1c. Health maintenance: Patient had a Pap smear done earlier this year. We will request the report. I will place a request for mammogram.  She is up-to-date on colon cancer screening. Past Medical History  Past Medical History:   Diagnosis Date    Depression     Hiatal hernia     causes heartburn occasionally       Surgical History  Past Surgical History:   Procedure Laterality Date    COLONOSCOPY N/A 2/27/2019    COLONOSCOPY performed by John Henderson MD at [facility] ENDOSCOPY    HX ANKLE FRACTURE TX      HX HYSTEROSCOPY WITH ENDOMETRIAL ABLATION      HX KNEE ARTHROSCOPY Left     HX ORTHOPAEDIC Left     ankle    HX TUBAL LIGATION      KNEE SCOPE, ALLOGRAFT IMPANT          Medications  Current Outpatient Medications   Medication Sig Dispense Refill    BIOTIN PO Take  by mouth.  cholecalciferol (VITAMIN D3) 1,000 unit tablet Take  by mouth daily.  ascorbic acid, vitamin C, (VITAMIN C) 500 mg tablet Take  by mouth.  cyanocobalamin (VITAMIN B-12) 1,000 mcg tablet Take 1,000 mcg by mouth daily.  ZOLMitriptan (ZOMIG-ZMT) 5 mg disintegrating tablet One tab at HA onset, may repeat x1 > 2 hours if needed.  Max 10 mg/24 hours 9 Tab 2    valACYclovir (VALTREX) 500 mg tablet TK 1 T PO QD         Allergies  Allergies   Allergen Reactions    Augmentin [Amoxicillin-Pot Clavulanate] Nausea and Vomiting       Family History  Family History   Problem Relation Age of Onset    Cancer Mother     Colon Cancer Mother     Cancer Father     Colon Cancer Brother        Social History  Social History     Socioeconomic History    Marital status:      Spouse name: Not on file    Number of children: Not on file    Years of education: Not on file    Highest education level: Not on file   Occupational History    Not on file   Social Needs    Financial resource strain: Not on file    Food insecurity     Worry: Not on file     Inability: Not on file    Transportation needs     Medical: Not on file     Non-medical: Not on file   Tobacco Use    Smoking status: Never Smoker    Smokeless tobacco: Never Used   Substance and Sexual Activity    Alcohol use: No    Drug use: No    Sexual activity: Not on file   Lifestyle    Physical activity     Days per week: Not on file     Minutes per session: Not on file    Stress: Not on file   Relationships    Social connections     Talks on phone: Not on file     Gets together: Not on file     Attends Mu-ism service: Not on file     Active member of club or organization: Not on file     Attends meetings of clubs or organizations: Not on file     Relationship status: Not on file    Intimate partner violence     Fear of current or ex partner: Not on file     Emotionally abused: Not on file     Physically abused: Not on file     Forced sexual activity: Not on file   Other Topics Concern    Not on file   Social History Narrative    Not on file       Review of Systems  Review of Systems - Review of all systems is negative except as noted above in the HPI.     Vital Signs  Visit Vitals  BP (!) 152/91 (BP 1 Location: Left arm, BP Patient Position: Sitting)   Pulse 72   Temp 98.5 °F (36.9 °C) (Oral)   Resp 16   Ht 5' 3\" (1.6 m)   Wt 210 lb (95.3 kg)   SpO2 96%   BMI 37.20 kg/m²         Physical Exam  Physical Examination: General appearance - alert, well appearing, and in no distress, oriented to person, place, and time, overweight, acyanotic, in no respiratory distress and well hydrated  Mental status - alert, oriented to person, place, and time, normal mood, behavior, speech, dress, motor activity, and thought processes  Nose - normal and patent, no erythema, discharge or polyps and normal nontender sinuses  Mouth - mucous membranes moist, pharynx normal without lesions  Neck - supple, no significant adenopathy  Lymphatics - no palpable lymphadenopathy, no hepatosplenomegaly  Chest - clear to auscultation, no wheezes, rales or rhonchi, symmetric air entry, no tachypnea, retractions or cyanosis  Heart - normal rate, regular rhythm, normal S1, S2, no murmurs, rubs, clicks or gallops  Abdomen - soft, nontender, nondistended, no masses or organomegaly  no rebound tenderness noted  bowel sounds normal  Back exam - full range of motion, no tenderness, palpable spasm or pain on motion  Neurological - alert, oriented, normal speech, no focal findings or movement disorder noted  Extremities - intact peripheral pulses        Results  Results for orders placed or performed in visit on 03/05/20    MAMMOGRAPHY   Result Value Ref Range    Mammography, External         ASSESSMENT and PLAN    ICD-10-CM ICD-9-CM    1. General medical exam  Z00.00 V70.9 CBC WITH AUTOMATED DIFF      METABOLIC PANEL, COMPREHENSIVE      LIPID PANEL      COLLECTION VENOUS BLOOD,VENIPUNCTURE   2. Migraine without status migrainosus, not intractable, unspecified migraine type  G43.909 346.90    3. Gastroesophageal reflux disease with esophagitis  K21.0 530.11    4. Encounter for screening mammogram for malignant neoplasm of breast  Z12.31 V76.12 [facility] MAMMO BI SCREENING INCL CAD   5. Screening for depression  Z13.31 V79.0    6. Hypovitaminosis D  E55.9 268.9 VITAMIN D, 25 HYDROXY      COLLECTION VENOUS BLOOD,VENIPUNCTURE   7. Hyperglycemia  R73.9 790.29 HEMOGLOBIN A1C WITH EAG      COLLECTION VENOUS BLOOD,VENIPUNCTURE   8. Elevated BP without diagnosis of hypertension  R03.0 796.2    9. SOB (shortness of breath)  R06.02 786.05 XR CHEST PA LAT   10. Obesity (BMI 30-39. 9)  E66.9 278.00      lab results and schedule of future lab studies reviewed with patient  reviewed diet, exercise and weight control  cardiovascular risk and specific lipid/LDL goals reviewed  reviewed medications and side effects in detail  radiology results and schedule of future radiology studies reviewed with patient      I have discussed the diagnosis with the patient and the intended plan of care as seen in the above orders. The patient has received an after-visit summary and questions were answered concerning future plans. I have discussed medication, side effects, and warnings with the patient in detail. The patient verbalized understanding and is in agreement with the plan of care. The patient will contact the office with any additional concerns. Binu Figueroa MD    PLEASE NOTE:   This document has been produced using voice recognition software.  Unrecognized errors in transcription may be present

## 2020-07-10 LAB
25(OH)D3 SERPL-MCNC: 12.1 NG/ML (ref 30–100)
ALBUMIN SERPL-MCNC: 3.7 G/DL (ref 3.4–5)
ALBUMIN/GLOB SERPL: 1 {RATIO} (ref 0.8–1.7)
ALP SERPL-CCNC: 104 U/L (ref 45–117)
ALT SERPL-CCNC: 20 U/L (ref 13–56)
ANION GAP SERPL CALC-SCNC: 5 MMOL/L (ref 3–18)
AST SERPL-CCNC: 10 U/L (ref 10–38)
BASOPHILS # BLD: 0 K/UL (ref 0–0.1)
BASOPHILS NFR BLD: 0 % (ref 0–2)
BILIRUB SERPL-MCNC: 0.3 MG/DL (ref 0.2–1)
BUN SERPL-MCNC: 12 MG/DL (ref 7–18)
BUN/CREAT SERPL: 12 (ref 12–20)
CALCIUM SERPL-MCNC: 9.4 MG/DL (ref 8.5–10.1)
CHLORIDE SERPL-SCNC: 105 MMOL/L (ref 100–111)
CHOLEST SERPL-MCNC: 232 MG/DL
CO2 SERPL-SCNC: 29 MMOL/L (ref 21–32)
CREAT SERPL-MCNC: 0.97 MG/DL (ref 0.6–1.3)
DIFFERENTIAL METHOD BLD: ABNORMAL
EOSINOPHIL # BLD: 0.2 K/UL (ref 0–0.4)
EOSINOPHIL NFR BLD: 2 % (ref 0–5)
ERYTHROCYTE [DISTWIDTH] IN BLOOD BY AUTOMATED COUNT: 13.7 % (ref 11.6–14.5)
EST. AVERAGE GLUCOSE BLD GHB EST-MCNC: 117 MG/DL
GLOBULIN SER CALC-MCNC: 3.8 G/DL (ref 2–4)
GLUCOSE SERPL-MCNC: 79 MG/DL (ref 74–99)
HBA1C MFR BLD: 5.7 % (ref 4.2–5.6)
HCT VFR BLD AUTO: 45 % (ref 35–45)
HDLC SERPL-MCNC: 57 MG/DL (ref 40–60)
HDLC SERPL: 4.1 {RATIO} (ref 0–5)
HGB BLD-MCNC: 14.5 G/DL (ref 12–16)
LDLC SERPL CALC-MCNC: 159.4 MG/DL (ref 0–100)
LIPID PROFILE,FLP: ABNORMAL
LYMPHOCYTES # BLD: 4.3 K/UL (ref 0.9–3.6)
LYMPHOCYTES NFR BLD: 40 % (ref 21–52)
MCH RBC QN AUTO: 33.3 PG (ref 24–34)
MCHC RBC AUTO-ENTMCNC: 32.2 G/DL (ref 31–37)
MCV RBC AUTO: 103.4 FL (ref 74–97)
MONOCYTES # BLD: 0.8 K/UL (ref 0.05–1.2)
MONOCYTES NFR BLD: 7 % (ref 3–10)
NEUTS SEG # BLD: 5.4 K/UL (ref 1.8–8)
NEUTS SEG NFR BLD: 51 % (ref 40–73)
PLATELET # BLD AUTO: 329 K/UL (ref 135–420)
PMV BLD AUTO: 11.5 FL (ref 9.2–11.8)
POTASSIUM SERPL-SCNC: 4.9 MMOL/L (ref 3.5–5.5)
PROT SERPL-MCNC: 7.5 G/DL (ref 6.4–8.2)
RBC # BLD AUTO: 4.35 M/UL (ref 4.2–5.3)
SODIUM SERPL-SCNC: 139 MMOL/L (ref 136–145)
TRIGL SERPL-MCNC: 78 MG/DL (ref ?–150)
VLDLC SERPL CALC-MCNC: 15.6 MG/DL
WBC # BLD AUTO: 10.6 K/UL (ref 4.6–13.2)

## 2020-07-22 RX ORDER — ERGOCALCIFEROL 1.25 MG/1
50000 CAPSULE ORAL
Qty: 13 CAP | Refills: 3 | Status: SHIPPED | OUTPATIENT
Start: 2020-07-22 | End: 2021-09-22

## 2020-07-23 NOTE — PROGRESS NOTES
Please let patient know her vitamin D is low at 12.1. I will send in vitamin D replacement therapy to take weekly. We will recheck labs in 3 to 6 months. Her HbA1c is 5.7. This indicates prediabetes. She should intensify lifestyle and dietary modification. We will recheck this at next visit. Her cholesterol is also elevated. Her LDL is at 159. He would like this to go down to below 100. She should exercise and take a diet low in polysaturated fats. Recheck in 3 to 6 months. If still elevated she should consider taking a cholesterol-lowering medication.   Cholo Aleman MD

## 2020-07-29 NOTE — PROGRESS NOTES
Spoke with patient at this time regarding results. Patient verbalized understanding at this time. Patient states she spoke with provider about medication for weight management during last appointment but she wanted to wait on her results first.Patient states she is ok with her results and would like to start the medication you mention at last appointment. Please advise

## 2020-08-06 DIAGNOSIS — E66.9 OBESITY (BMI 30-39.9): Primary | ICD-10-CM

## 2020-08-06 RX ORDER — PHENTERMINE HYDROCHLORIDE 37.5 MG/1
37.5 TABLET ORAL
Qty: 30 TAB | Refills: 0 | Status: SHIPPED | OUTPATIENT
Start: 2020-08-06 | End: 2021-04-20 | Stop reason: CLARIF

## 2020-08-06 NOTE — PROGRESS NOTES
Phentermine sent in. Patient should set up an appointment to follow-up in 1 month for weight check.   Uzma Keyes MD

## 2020-08-20 ENCOUNTER — OFFICE VISIT (OUTPATIENT)
Dept: FAMILY MEDICINE CLINIC | Age: 55
End: 2020-08-20

## 2020-08-20 VITALS
BODY MASS INDEX: 37.74 KG/M2 | SYSTOLIC BLOOD PRESSURE: 140 MMHG | RESPIRATION RATE: 16 BRPM | HEIGHT: 63 IN | WEIGHT: 213 LBS | TEMPERATURE: 98.9 F | DIASTOLIC BLOOD PRESSURE: 90 MMHG | OXYGEN SATURATION: 100 % | HEART RATE: 66 BPM

## 2020-08-20 DIAGNOSIS — E66.9 OBESITY (BMI 30-39.9): ICD-10-CM

## 2020-08-20 DIAGNOSIS — I10 ESSENTIAL HYPERTENSION: Primary | ICD-10-CM

## 2020-08-20 RX ORDER — AMLODIPINE BESYLATE 5 MG/1
5 TABLET ORAL DAILY
Qty: 30 TAB | Refills: 2 | Status: SHIPPED | OUTPATIENT
Start: 2020-08-20 | End: 2020-11-30 | Stop reason: SDUPTHER

## 2020-08-20 NOTE — PATIENT INSTRUCTIONS
DASH Diet: Care Instructions Your Care Instructions The DASH diet is an eating plan that can help lower your blood pressure. DASH stands for Dietary Approaches to Stop Hypertension. Hypertension is high blood pressure. The DASH diet focuses on eating foods that are high in calcium, potassium, and magnesium. These nutrients can lower blood pressure. The foods that are highest in these nutrients are fruits, vegetables, low-fat dairy products, nuts, seeds, and legumes. But taking calcium, potassium, and magnesium supplements instead of eating foods that are high in those nutrients does not have the same effect. The DASH diet also includes whole grains, fish, and poultry. The DASH diet is one of several lifestyle changes your doctor may recommend to lower your high blood pressure. Your doctor may also want you to decrease the amount of sodium in your diet. Lowering sodium while following the DASH diet can lower blood pressure even further than just the DASH diet alone. Follow-up care is a key part of your treatment and safety. Be sure to make and go to all appointments, and call your doctor if you are having problems. It's also a good idea to know your test results and keep a list of the medicines you take. How can you care for yourself at home? Following the DASH diet · Eat 4 to 5 servings of fruit each day. A serving is 1 medium-sized piece of fruit, ½ cup chopped or canned fruit, 1/4 cup dried fruit, or 4 ounces (½ cup) of fruit juice. Choose fruit more often than fruit juice. · Eat 4 to 5 servings of vegetables each day. A serving is 1 cup of lettuce or raw leafy vegetables, ½ cup of chopped or cooked vegetables, or 4 ounces (½ cup) of vegetable juice. Choose vegetables more often than vegetable juice. · Get 2 to 3 servings of low-fat and fat-free dairy each day. A serving is 8 ounces of milk, 1 cup of yogurt, or 1 ½ ounces of cheese. · Eat 6 to 8 servings of grains each day. A serving is 1 slice of bread, 1 ounce of dry cereal, or ½ cup of cooked rice, pasta, or cooked cereal. Try to choose whole-grain products as much as possible. · Limit lean meat, poultry, and fish to 2 servings each day. A serving is 3 ounces, about the size of a deck of cards. · Eat 4 to 5 servings of nuts, seeds, and legumes (cooked dried beans, lentils, and split peas) each week. A serving is 1/3 cup of nuts, 2 tablespoons of seeds, or ½ cup of cooked beans or peas. · Limit fats and oils to 2 to 3 servings each day. A serving is 1 teaspoon of vegetable oil or 2 tablespoons of salad dressing. · Limit sweets and added sugars to 5 servings or less a week. A serving is 1 tablespoon jelly or jam, ½ cup sorbet, or 1 cup of lemonade. · Eat less than 2,300 milligrams (mg) of sodium a day. If you limit your sodium to 1,500 mg a day, you can lower your blood pressure even more. Tips for success · Start small. Do not try to make dramatic changes to your diet all at once. You might feel that you are missing out on your favorite foods and then be more likely to not follow the plan. Make small changes, and stick with them. Once those changes become habit, add a few more changes. · Try some of the following: ? Make it a goal to eat a fruit or vegetable at every meal and at snacks. This will make it easy to get the recommended amount of fruits and vegetables each day. ? Try yogurt topped with fruit and nuts for a snack or healthy dessert. ? Add lettuce, tomato, cucumber, and onion to sandwiches. ? Combine a ready-made pizza crust with low-fat mozzarella cheese and lots of vegetable toppings. Try using tomatoes, squash, spinach, broccoli, carrots, cauliflower, and onions. ? Have a variety of cut-up vegetables with a low-fat dip as an appetizer instead of chips and dip. ? Sprinkle sunflower seeds or chopped almonds over salads.  Or try adding chopped walnuts or almonds to cooked vegetables. ? Try some vegetarian meals using beans and peas. Add garbanzo or kidney beans to salads. Make burritos and tacos with mashed lozada beans or black beans. Where can you learn more? Go to http://nolan-andra.info/ Enter Z514 in the search box to learn more about \"DASH Diet: Care Instructions. \" Current as of: December 16, 2019               Content Version: 12.5 © 6515-8900 [employer]. Care instructions adapted under license by [employer] (which disclaims liability or warranty for this information). If you have questions about a medical condition or this instruction, always ask your healthcare professional. [address] any warranty or liability for your use of this information. Low Sodium Diet (2,000 Milligram): Care Instructions Your Care Instructions Too much sodium causes your body to hold on to extra water. This can raise your blood pressure and force your heart and kidneys to work harder. In very serious cases, this could cause you to be put in the hospital. It might even be life-threatening. By limiting sodium, you will feel better and lower your risk of serious problems. The most common source of sodium is salt. People get most of the salt in their diet from canned, prepared, and packaged foods. Fast food and restaurant meals also are very high in sodium. Your doctor will probably limit your sodium to less than 2,000 milligrams (mg) a day. This limit counts all the sodium in prepared and packaged foods and any salt you add to your food. Follow-up care is a key part of your treatment and safety. Be sure to make and go to all appointments, and call your doctor if you are having problems. It's also a good idea to know your test results and keep a list of the medicines you take. How can you care for yourself at home? Read food labels · Read labels on cans and food packages. The labels tell you how much sodium is in each serving. Make sure that you look at the serving size. If you eat more than the serving size, you have eaten more sodium. · Food labels also tell you the Percent Daily Value for sodium. Choose products with low Percent Daily Values for sodium. · Be aware that sodium can come in forms other than salt, including monosodium glutamate (MSG), sodium citrate, and sodium bicarbonate (baking soda). MSG is often added to Asian food. When you eat out, you can sometimes ask for food without MSG or added salt. Buy low-sodium foods · Buy foods that are labeled \"unsalted\" (no salt added), \"sodium-free\" (less than 5 mg of sodium per serving), or \"low-sodium\" (less than 140 mg of sodium per serving). Foods labeled \"reduced-sodium\" and \"light sodium\" may still have too much sodium. Be sure to read the label to see how much sodium you are getting. · Buy fresh vegetables, or frozen vegetables without added sauces. Buy low-sodium versions of canned vegetables, soups, and other canned goods. Prepare low-sodium meals · Cut back on the amount of salt you use in cooking. This will help you adjust to the taste. Do not add salt after cooking. One teaspoon of salt has about 2,300 mg of sodium. · Take the salt shaker off the table. · Flavor your food with garlic, lemon juice, onion, vinegar, herbs, and spices. Do not use soy sauce, lite soy sauce, steak sauce, onion salt, garlic salt, celery salt, mustard, or ketchup on your food. · Use low-sodium salad dressings, sauces, and ketchup. Or make your own salad dressings and sauces without adding salt. · Use less salt (or none) when recipes call for it. You can often use half the salt a recipe calls for without losing flavor. Other foods such as rice, pasta, and grains do not need added salt. · Rinse canned vegetables, and cook them in fresh water. This removes somebut not allof the salt. · Avoid water that is naturally high in sodium or that has been treated with water softeners, which add sodium. Call your local water company to find out the sodium content of your water supply. If you buy bottled water, read the label and choose a sodium-free brand. Avoid high-sodium foods · Avoid eating: 
? Smoked, cured, salted, and canned meat, fish, and poultry. ? Ham, damon, hot dogs, and luncheon meats. ? Regular, hard, and processed cheese and regular peanut butter. ? Crackers with salted tops, and other salted snack foods such as pretzels, chips, and salted popcorn. ? Frozen prepared meals, unless labeled low-sodium. ? Canned and dried soups, broths, and bouillon, unless labeled sodium-free or low-sodium. ? Canned vegetables, unless labeled sodium-free or low-sodium. ? [place] fries, pizza, tacos, and other fast foods. ? Pickles, olives, ketchup, and other condiments, especially soy sauce, unless labeled sodium-free or low-sodium. Where can you learn more? Go to http://nolan-andra.info/ Enter A315 in the search box to learn more about \"Low Sodium Diet (2,000 Milligram): Care Instructions. \" Current as of: August 22, 2019               Content Version: 12.5 © 0793-8299 [employer]. Care instructions adapted under license by [employer] (which disclaims liability or warranty for this information). If you have questions about a medical condition or this instruction, always ask your healthcare professional. [place] [address] any warranty or liability for your use of this information.

## 2020-08-20 NOTE — PROGRESS NOTES
Chief Complaint   Patient presents with    Hypertension    Weight Management     1. Have you been to the ER, urgent care clinic since your last visit? Hospitalized since your last visit? No    2. Have you seen or consulted any other health care providers outside of the [address] [place] [address] since your last visit? Include any pap smears or colon screening. No     [facility] comes in for follow-up care. Hypertension: Patient has hypertension. Blood pressure is elevated. She states that at home her blood pressure does fluctuate. She denies headache, changes in vision or focal weakness. We discussed blood pressure control. I will start her on amlodipine 5 mg daily. She will keep a blood pressure log. I will follow-up at next visit. I will give information on a DASH diet and low-sodium diet. Obesity: Patient has obesity. BMI 37.73. Previously had sent in a prescription for phentermine. We will hold off on this until blood pressure is under control. Past Medical History  Past Medical History:   Diagnosis Date    Depression     Hiatal hernia     causes heartburn occasionally       Surgical History  Past Surgical History:   Procedure Laterality Date    COLONOSCOPY N/A 2/27/2019    COLONOSCOPY performed by Brielle Mark MD at [facility]    HX ANKLE FRACTURE TX      HX HYSTEROSCOPY WITH ENDOMETRIAL ABLATION      HX KNEE ARTHROSCOPY Left     HX ORTHOPAEDIC Left     ankle    HX TUBAL LIGATION      KNEE SCOPE, ALLOGRAFT IMPANT          Medications  Current Outpatient Medications   Medication Sig Dispense Refill    ergocalciferol (ERGOCALCIFEROL) 1,250 mcg (50,000 unit) capsule Take 1 Cap by mouth every seven (7) days. 13 Cap 3    valACYclovir (VALTREX) 500 mg tablet TK 1 T PO QD      BIOTIN PO Take  by mouth.  ascorbic acid, vitamin C, (VITAMIN C) 500 mg tablet Take  by mouth.  cyanocobalamin (VITAMIN B-12) 1,000 mcg tablet Take 1,000 mcg by mouth daily.       ZOLMitriptan (ZOMIG-ZMT) 5 mg disintegrating tablet One tab at HA onset, may repeat x1 > 2 hours if needed. Max 10 mg/24 hours 9 Tab 2    phentermine (ADIPEX-P) 37.5 mg tablet Take 1 Tab by mouth every morning.  Max Daily Amount: 37.5 mg. 30 Tab 0       Allergies  Allergies   Allergen Reactions    Augmentin [Amoxicillin-Pot Clavulanate] Nausea and Vomiting       Family History  Family History   Problem Relation Age of Onset    Cancer Mother     Colon Cancer Mother     Cancer Father     Colon Cancer Brother        Social History  Social History     Socioeconomic History    Marital status:      Spouse name: Not on file    Number of children: Not on file    Years of education: Not on file    Highest education level: Not on file   Occupational History    Not on file   Social Needs    Financial resource strain: Not on file    Food insecurity     Worry: Not on file     Inability: Not on file    Transportation needs     Medical: Not on file     Non-medical: Not on file   Tobacco Use    Smoking status: Never Smoker    Smokeless tobacco: Never Used   Substance and Sexual Activity    Alcohol use: No    Drug use: No    Sexual activity: Not on file   Lifestyle    Physical activity     Days per week: Not on file     Minutes per session: Not on file    Stress: Not on file   Relationships    Social connections     Talks on phone: Not on file     Gets together: Not on file     Attends Faith service: Not on file     Active member of club or organization: Not on file     Attends meetings of clubs or organizations: Not on file     Relationship status: Not on file    Intimate partner violence     Fear of current or ex partner: Not on file     Emotionally abused: Not on file     Physically abused: Not on file     Forced sexual activity: Not on file   Other Topics Concern    Not on file   Social History Narrative    Not on file       Review of Systems  Review of Systems - Review of all systems is negative except as noted above in the HPI. Vital Signs  Visit Vitals  /90 (BP 1 Location: Left arm, BP Patient Position: Sitting)   Pulse 66   Temp 98.9 °F (37.2 °C) (Oral)   Resp 16   Ht 5' 3\" (1.6 m)   Wt 213 lb (96.6 kg)   SpO2 100%   BMI 37.73 kg/m²         Physical Exam  Physical Examination: General appearance - alert, well appearing, and in no distress, oriented to person, place, and time, overweight and acyanotic, in no respiratory distress  Mental status - alert, oriented to person, place, and time, normal mood, behavior, speech, dress, motor activity, and thought processes  Chest - clear to auscultation, no wheezes, rales or rhonchi, symmetric air entry  Heart - normal rate and regular rhythm, S1 and S2 normal  Neurological - motor and sensory grossly normal bilaterally  Musculoskeletal - no muscular tenderness noted  Extremities - no pedal edema noted      Results  Results for orders placed or performed during the hospital encounter of 07/09/20   CBC WITH AUTOMATED DIFF   Result Value Ref Range    WBC 10.6 4.6 - 13.2 K/uL    RBC 4.35 4.20 - 5.30 M/uL    HGB 14.5 12.0 - 16.0 g/dL    HCT 45.0 35.0 - 45.0 %    .4 (H) 74.0 - 97.0 FL    MCH 33.3 24.0 - 34.0 PG    MCHC 32.2 31.0 - 37.0 g/dL    RDW 13.7 11.6 - 14.5 %    PLATELET 046 558 - 376 K/uL    MPV 11.5 9.2 - 11.8 FL    NEUTROPHILS 51 40 - 73 %    LYMPHOCYTES 40 21 - 52 %    MONOCYTES 7 3 - 10 %    EOSINOPHILS 2 0 - 5 %    BASOPHILS 0 0 - 2 %    ABS. NEUTROPHILS 5.4 1.8 - 8.0 K/UL    ABS. LYMPHOCYTES 4.3 (H) 0.9 - 3.6 K/UL    ABS. MONOCYTES 0.8 0.05 - 1.2 K/UL    ABS. EOSINOPHILS 0.2 0.0 - 0.4 K/UL    ABS.  BASOPHILS 0.0 0.0 - 0.1 K/UL    DF AUTOMATED     METABOLIC PANEL, COMPREHENSIVE   Result Value Ref Range    Sodium 139 136 - 145 mmol/L    Potassium 4.9 3.5 - 5.5 mmol/L    Chloride 105 100 - 111 mmol/L    CO2 29 21 - 32 mmol/L    Anion gap 5 3.0 - 18 mmol/L    Glucose 79 74 - 99 mg/dL    BUN 12 7.0 - 18 MG/DL    Creatinine 0.97 0.6 - 1.3 MG/DL    BUN/Creatinine ratio 12 12 - 20      GFR est AA >60 >60 ml/min/1.73m2    GFR est non-AA >60 >60 ml/min/1.73m2    Calcium 9.4 8.5 - 10.1 MG/DL    Bilirubin, total 0.3 0.2 - 1.0 MG/DL    ALT (SGPT) 20 13 - 56 U/L    AST (SGOT) 10 10 - 38 U/L    Alk. phosphatase 104 45 - 117 U/L    Protein, total 7.5 6.4 - 8.2 g/dL    Albumin 3.7 3.4 - 5.0 g/dL    Globulin 3.8 2.0 - 4.0 g/dL    A-G Ratio 1.0 0.8 - 1.7     LIPID PANEL   Result Value Ref Range    LIPID PROFILE          Cholesterol, total 232 (H) <200 MG/DL    Triglyceride 78 <150 MG/DL    HDL Cholesterol 57 40 - 60 MG/DL    LDL, calculated 159.4 (H) 0 - 100 MG/DL    VLDL, calculated 15.6 MG/DL    CHOL/HDL Ratio 4.1 0 - 5.0     HEMOGLOBIN A1C WITH EAG   Result Value Ref Range    Hemoglobin A1c 5.7 (H) 4.2 - 5.6 %    Est. average glucose 117 mg/dL   VITAMIN D, 25 HYDROXY   Result Value Ref Range    Vitamin D 25-Hydroxy 12.1 (L) 30 - 100 ng/mL       ASSESSMENT and PLAN    ICD-10-CM ICD-9-CM    1. Essential hypertension  I10 401.9 amLODIPine (NORVASC) 5 mg tablet   2. Obesity (BMI 30-39. 9)  E66.9 278.00      lab results and schedule of future lab studies reviewed with patient  reviewed diet, exercise and weight control  reviewed medications and side effects in detail      I have discussed the diagnosis with the patient and the intended plan of care as seen in the above orders. The patient has received an after-visit summary and questions were answered concerning future plans. I have discussed medication, side effects, and warnings with the patient in detail. The patient verbalized understanding and is in agreement with the plan of care. The patient will contact the office with any additional concerns. Nick Franco MD    PLEASE NOTE:   This document has been produced using voice recognition software.  Unrecognized errors in transcription may be present

## 2020-10-01 LAB — MAMMOGRAPHY, EXTERNAL: NORMAL

## 2020-11-30 DIAGNOSIS — I10 ESSENTIAL HYPERTENSION: ICD-10-CM

## 2020-11-30 RX ORDER — AMLODIPINE BESYLATE 5 MG/1
5 TABLET ORAL DAILY
Qty: 90 TAB | Refills: 1 | Status: SHIPPED | OUTPATIENT
Start: 2020-11-30 | End: 2021-06-24

## 2020-11-30 NOTE — TELEPHONE ENCOUNTER
Requested Prescriptions     Pending Prescriptions Disp Refills    amLODIPine (NORVASC) 5 mg tablet 30 Tab 2     Sig: Take 1 Tab by mouth daily.

## 2021-01-25 ENCOUNTER — PATIENT MESSAGE (OUTPATIENT)
Dept: FAMILY MEDICINE CLINIC | Age: 56
End: 2021-01-25

## 2021-02-22 ENCOUNTER — OFFICE VISIT (OUTPATIENT)
Dept: ORTHOPEDIC SURGERY | Age: 56
End: 2021-02-22
Payer: COMMERCIAL

## 2021-02-22 VITALS
WEIGHT: 210.4 LBS | BODY MASS INDEX: 37.28 KG/M2 | HEIGHT: 63 IN | DIASTOLIC BLOOD PRESSURE: 78 MMHG | SYSTOLIC BLOOD PRESSURE: 136 MMHG | TEMPERATURE: 97.3 F | HEART RATE: 91 BPM

## 2021-02-22 DIAGNOSIS — M25.561 ACUTE PAIN OF RIGHT KNEE: ICD-10-CM

## 2021-02-22 DIAGNOSIS — S83.8X1A MENISCAL INJURY, RIGHT, INITIAL ENCOUNTER: Primary | ICD-10-CM

## 2021-02-22 DIAGNOSIS — S83.8X1A MENISCAL INJURY, RIGHT, INITIAL ENCOUNTER: ICD-10-CM

## 2021-02-22 PROCEDURE — 99214 OFFICE O/P EST MOD 30 MIN: CPT | Performed by: ORTHOPAEDIC SURGERY

## 2021-02-22 PROCEDURE — 73560 X-RAY EXAM OF KNEE 1 OR 2: CPT | Performed by: ORTHOPAEDIC SURGERY

## 2021-02-22 RX ORDER — MELOXICAM 15 MG/1
15 TABLET ORAL
Qty: 30 TAB | Refills: 1 | Status: SHIPPED | OUTPATIENT
Start: 2021-02-22 | End: 2021-02-24

## 2021-02-22 NOTE — PROGRESS NOTES
Thad Jung  1965   Chief Complaint   Patient presents with    Knee Pain     right        HISTORY OF PRESENT ILLNESS  Thad Jung is a 50 y.o. female who presents today for evaluation of right knee pain. Patient rates pain as 8/10 today. Pain has been present on and off X 1 month. Pt was walking last week and she experienced an increase in pain. Describes an aching pain with walking. Also notes a burning pain when sitting. Pt works at a . Notes significant limping with walking. Associated symptom of swelling. Patient denies any fever, chills, chest pain, shortness of breath or calf pain. The remainder of the review of systems is negative. There are no new illness or injuries to report since last seen in the office. There are no changes to medications, allergies, family or social history. PHYSICAL EXAM:   Visit Vitals  /78   Pulse 91   Temp 97.3 °F (36.3 °C)   Ht 5' 3\" (1.6 m)   Wt 210 lb 6.4 oz (95.4 kg)   BMI 37.27 kg/m²     The patient is a well-developed, well-nourished female   in no acute distress. The patient is alert and oriented times three. The patient is alert and oriented times three. Mood and affect are normal.  LYMPHATIC: lymph nodes are not enlarged and are within normal limits  SKIN: normal in color and non tender to palpation. There are no bruises or abrasions noted. NEUROLOGICAL: Motor sensory exam is within normal limits. Reflexes are equal bilaterally.  There is normal sensation to pinprick and light touch  MUSCULOSKELETAL:   Examination Right knee   Skin Intact   Range of motion 0-130   Effusion +   Medial joint line tenderness +   Lateral joint line tenderness -   Tenderness Pes Bursa -   Tenderness insertion MCL -   Tenderness insertion LCL -   Nishis +   Patella crepitus +   Patella grind -   Lachman -   Pivot shift -   Anterior drawer -   Posterior drawer -   Varus stress -   Valgus stress -   Neurovascular Intact   Calf Swelling and Tenderness to Palpation -   Elizabeth's Test -   Hamstring Cord Tightness -       IMAGING: XR of right knee with 2 views obtained in the office dated 2/22/2021 was reviewed and read by Dr. Valentin Evans: Slight decreased joint space on the medial side      IMPRESSION:      ICD-10-CM ICD-9-CM    1. Meniscal injury, right, initial encounter  S83. 8X1A 959.7 MRI KNEE RT WO CONT      meloxicam (Mobic) 15 mg tablet   2. Acute pain of right knee  M25.561 719.46 AMB POC XRAY, KNEE; 1/2 VIEWS        PLAN:   1. Pt presents today with right knee pain and I am ordering an MRI to r/o a meniscus tear. Was also given a prescription for Mobic today. Risk factors include: BMI>35  2. No ultrasound exam indicated today  3. No cortisone injection indicated today   4. No Physical/Occupational Therapy indicated today  5. Yes diagnostic test indicated today: MRI R KNEE  6. No durable medical equipment indicated today  7. No referral indicated today   8. Yes medications indicated today: MOBIC  9. No Narcotic indicated today       RTC following MRI      Scribed by Manuela Romero) as dictated by Radha Bear MD    I, Dr. Radha Bear, confirm that all documentation is accurate.     Radha Bear M.D.   [facility]

## 2021-02-22 NOTE — PATIENT INSTRUCTIONS
Meniscus Tear: Exercises Introduction Here are some examples of exercises for you to try. The exercises may be suggested for a condition or for rehabilitation. Start each exercise slowly. Ease off the exercises if you start to have pain. You will be told when to start these exercises and which ones will work best for you. How to do the exercises Calf wall stretch 1. Stand facing a wall with your hands on the wall at about eye level. Put your affected leg about a step behind your other leg. 2. Keeping your back leg straight and your back heel on the floor, bend your front knee and gently bring your hip and chest toward the wall until you feel a stretch in the calf of your back leg. 3. Hold the stretch for at least 15 to 30 seconds. 4. Repeat 2 to 4 times. 5. Repeat steps 1 through 4, but this time keep your back knee bent. Hamstring wall stretch 1. Lie on your back in a doorway, with your good leg through the open door. 2. Slide your affected leg up the wall to straighten your knee. You should feel a gentle stretch down the back of your leg. 3. Hold the stretch for at least 1 minute. Then over time, try to lengthen the time you hold the stretch to as long as 6 minutes. 4. Repeat 2 to 4 times. 5. If you do not have a place to do this exercise in a doorway, there is another way to do it: 6. Lie on your back, and bend your affected leg. 7. Loop a towel under the ball and toes of that foot, and hold the ends of the towel in your hands. 8. Straighten your knee, and slowly pull back on the towel. You should feel a gentle stretch down the back of your leg. 9. Hold the stretch for at least 15 to 30 seconds. Or even better, hold the stretch for 1 minute if you can. 10. Repeat 2 to 4 times. 1. Do not arch your back. 2. Do not bend either knee. 3. Keep one heel touching the floor and the other heel touching the wall. Do not point your toes. Relaborate 1. Sit with your leg straight and supported on the floor or a firm bed. (If you feel discomfort in the front or back of your knee, place a small towel roll under your knee.) 2. Tighten the muscles on top of your thigh by pressing the back of your knee flat down to the floor. (If you feel discomfort under your kneecap, place a small towel roll under your knee.) 3. Hold for about 6 seconds, then rest up to 10 seconds. 4. Do 8 to 12 repetitions several times a day. Straight-leg raises to the front 1. Lie on your back with your good knee bent so that your foot rests flat on the floor. Your injured leg should be straight. Make sure that your low back has a normal curve. You should be able to slip your flat hand in between the floor and the small of your back, with your palm touching the floor and your back touching the back of your hand. 2. Tighten the thigh muscles in the injured leg by pressing the back of your knee flat down to the floor. Hold your knee straight. 3. Keeping the thigh muscles tight, lift your injured leg up so that your heel is about 12 inches off the floor. Hold for 5 seconds and then lower slowly. 4. Do 8 to 12 repetitions. Straight-leg raises to the back 1. Lie on your stomach, and lift your leg straight up behind you (toward the ceiling). 2. Lift your toes about 6 inches off the floor, hold for about 6 seconds, then lower slowly. 3. Do 8 to 12 repetitions. Hamstring curls 1. Lie on your stomach with your knees straight. If your kneecap is uncomfortable, roll up a washcloth and put it under your leg just above your kneecap. 2. Lift the foot of your injured leg by bending the knee so that you bring the foot up toward your buttock. If this motion hurts, try it without bending your knee quite as far. This may help you avoid any painful motion. 3. Slowly lower your leg back to the floor. 4. Do 8 to 12 repetitions. 5. With permission from your doctor or physical therapist, you may also want to add a cuff weight to your ankle (not more than 5 pounds). With weight, you do not have to lift your leg more than 12 inches to get a hamstring workout. Wall slide with ball squeeze 1. Stand with your back against a wall and with your feet about shoulder-width apart. Your feet should be about 12 inches away from the wall. 2. Put a ball about the size of a soccer ball between your knees. Then slowly slide down the wall until your knees are bent about 20 to 30 degrees. 3. Tighten your thigh muscles by squeezing the ball between your knees. Hold that position for about 10 seconds, then stop squeezing. Rest for up to 10 seconds between repetitions. 4. Repeat 8 to 12 times. Heel raises 1. Stand with your feet a few inches apart, with your hands lightly resting on a counter or chair in front of you. 2. Slowly raise your heels off the floor while keeping your knees straight. 3. Hold for about 6 seconds, then slowly lower your heels to the floor. 4. Do 8 to 12 repetitions several times during the day. Heel dig bridging Stop doing this exercise if it causes pain. 1. Lie on your back with both knees bent and your ankles bent so that only your heels are digging into the floor. Your knees should be bent about 90 degrees. 2. Then push your heels into the floor, squeeze your buttocks, and lift your hips off the floor until your shoulders, hips, and knees are all in a straight line. 3. Hold for about 6 seconds as you continue to breathe normally, and then slowly lower your hips back down to the floor and rest for up to 10 seconds. 4. Do 8 to 12 repetitions. Shallow standing knee bends Do this exercise only if you have very little pain; if you have no clicking, locking, or giving way in the injured knee; and if it does not hurt while you are doing 8 to 12 repetitions. 1. Stand with your hands lightly resting on a counter or chair in front of you. Put your feet shoulder-width apart. 2. Slowly bend your knees so that you squat down like you are going to sit in a chair. Make sure your knees do not go in front of your toes. 3. Lower yourself about 6 inches. Your heels should remain on the floor at all times. 4. Rise slowly to a standing position. Follow-up care is a key part of your treatment and safety. Be sure to make and go to all appointments, and call your doctor if you are having problems. It's also a good idea to know your test results and keep a list of the medicines you take. Where can you learn more? Go to http://www.gray.com/ Enter C183 in the search box to learn more about \"Meniscus Tear: Exercises. \" Current as of: March 2, 2020               Content Version: 12.6 © 6040-4658 [employer]. Care instructions adapted under license by [employer] (which disclaims liability or warranty for this information). If you have questions about a medical condition or this instruction, always ask your healthcare professional. [place] [address] any warranty or liability for your use of this information.

## 2021-02-23 DIAGNOSIS — S83.8X1A MENISCAL INJURY, RIGHT, INITIAL ENCOUNTER: ICD-10-CM

## 2021-02-24 RX ORDER — MELOXICAM 15 MG/1
TABLET ORAL
Qty: 90 TAB | Refills: 2 | Status: SHIPPED | OUTPATIENT
Start: 2021-02-24 | End: 2021-04-19

## 2021-03-16 ENCOUNTER — HOSPITAL ENCOUNTER (OUTPATIENT)
Age: 56
Discharge: HOME | End: 2021-03-16
Attending: ORTHOPAEDIC SURGERY
Payer: COMMERCIAL

## 2021-03-16 PROCEDURE — 73721 MRI JNT OF LWR EXTRE W/O DYE: CPT

## 2021-03-23 ENCOUNTER — OFFICE VISIT (OUTPATIENT)
Dept: ORTHOPEDIC SURGERY | Age: 56
End: 2021-03-23
Payer: COMMERCIAL

## 2021-03-23 VITALS
RESPIRATION RATE: 16 BRPM | OXYGEN SATURATION: 99 % | TEMPERATURE: 97.3 F | BODY MASS INDEX: 38.45 KG/M2 | HEIGHT: 63 IN | WEIGHT: 217 LBS | HEART RATE: 90 BPM

## 2021-03-23 DIAGNOSIS — S83.241A TEAR OF MEDIAL MENISCUS OF RIGHT KNEE, CURRENT, UNSPECIFIED TEAR TYPE, INITIAL ENCOUNTER: Primary | ICD-10-CM

## 2021-03-23 DIAGNOSIS — M17.11 PRIMARY OSTEOARTHRITIS OF RIGHT KNEE: ICD-10-CM

## 2021-03-23 PROCEDURE — 99214 OFFICE O/P EST MOD 30 MIN: CPT | Performed by: ORTHOPAEDIC SURGERY

## 2021-03-23 NOTE — PROGRESS NOTES
Thad Jung  1965   Chief Complaint   Patient presents with    Knee Pain     right knee        HISTORY OF PRESENT ILLNESS  Thad Jung is a 50 y.o. female who presents today for reevaluation of right knee and MRI review. Patient rates pain as 7/10 today. Pain has been present on and off X 2 months. Pt was walking a couple of weeks ago and she experienced an increase in pain. Describes an aching pain with walking. Also notes a burning pain when sitting. Pt works at a . Notes significant limping with walking. Associated symptom of swelling. Also having night pain. Describes night pain as feeling like a toothache. Patient denies any fever, chills, chest pain, shortness of breath or calf pain. The remainder of the review of systems is negative. There are no new illness or injuries to report since last seen in the office. There are no changes to medications, allergies, family or social history. Pain Assessment  3/23/2021   Location of Pain Knee   Location Modifiers Right   Severity of Pain 7   Quality of Pain Aching; Throbbing;Burning   Duration of Pain Persistent   Frequency of Pain Constant   Aggravating Factors Stretching   Aggravating Factors Comment sitting and laying down   Limiting Behavior Yes   Relieving Factors -   Relieving Factors Comment -   Result of Injury -       PHYSICAL EXAM:   Visit Vitals  Pulse 90   Temp 97.3 °F (36.3 °C) (Temporal)   Resp 16   Ht 5' 3\" (1.6 m)   Wt 217 lb (98.4 kg)   SpO2 99%   BMI 38.44 kg/m²     The patient is a well-developed, well-nourished female   in no acute distress. The patient is alert and oriented times three. The patient is alert and oriented times three. Mood and affect are normal.  LYMPHATIC: lymph nodes are not enlarged and are within normal limits  SKIN: normal in color and non tender to palpation. There are no bruises or abrasions noted. NEUROLOGICAL: Motor sensory exam is within normal limits. Reflexes are equal bilaterally.  There is normal sensation to pinprick and light touch  MUSCULOSKELETAL:   Examination Right knee   Skin Intact   Range of motion 0-130   Effusion +   Medial joint line tenderness +   Lateral joint line tenderness -   Tenderness Pes Bursa -   Tenderness insertion MCL -   Tenderness insertion LCL -   Nishis +   Patella crepitus +   Patella grind -   Lachman -   Pivot shift -   Anterior drawer -   Posterior drawer -   Varus stress -   Valgus stress -   Neurovascular Intact   Calf Swelling and Tenderness to Palpation -   Elizabeth's Test -   Hamstring Cord Tightness -       IMAGING: MRI of right knee dated 3/16/2021 was reviewed and read by Dr. David Asif:   IMPRESSION:   1. Oblique likely radial tear of the body of the medial meniscus with  displacement of the anterior body and anterior horn toward the medial gutter. 2.  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 3.  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 XR of right knee with 2 views obtained in the office dated 2/22/2021 was reviewed and read by Dr. David Asif: Slight decreased joint space on the medial side      IMPRESSION:      ICD-10-CM ICD-9-CM    1. Tear of medial meniscus of right knee, current, unspecified tear type, initial encounter  S83.241A 836.0    2. Primary osteoarthritis of right knee  M17.11 715.16         PLAN:   1. Pt presents today with right knee pain due to an MRI-documented MMT. Will proceed with scheduling surgery for the knee. Pt is also complaining of night pain and trouble with sleeping. Was offered prescription for Ambien but patient declined. I discussed the risks and benefits and potential adverse outcomes of both operative vs non operative treatment of right knee MMT with the patient and patient wishes to proceed with arthroscopic right partial medial meniscectomy.       Risks of operative intervention include but not limited to bleeding, infection, deep vein thrombosis, pulmonary embolism, death, limb length discrepancy, reflexive sympathetic dystrophy, fat embolism syndrome,damage to blood vessels and nerves, malunion, non-union, delayed union, failure of hardware, post traumatic arthritis, stroke, heart attack, and death. Patient understands that infection may arise and may require numerous surgeries. The patient was counseled at length about the risks of rustam Covid-19 during their perioperative period and any recovery window from their procedure. The patient was made aware that rustam Covid-19  may worsen their prognosis for recovering from their procedure and lend to a higher morbidity and/or mortality risk. All material risks, benefits, and reasonable alternatives including postponing the procedure were discussed. The patient does  wish to proceed with the procedure at this time. History and physical exam to be preformed at a later date. Risk factors include: BMI>35, primary OA  2. No ultrasound exam indicated today  3. No cortisone injection indicated today   4. No Physical/Occupational Therapy indicated today  5. No diagnostic test indicated today  6. No durable medical equipment indicated today  7. No referral indicated today   8. No medications indicated today  9. No Narcotic indicated today       RTC H&P      Scribed by Srini Abo [address]) as dictated by Hetal Garces MD    I, Dr. Hetal Garces, confirm that all documentation is accurate.     Hetal Garces M.D.   [facility]

## 2021-04-13 ENCOUNTER — HOSPITAL ENCOUNTER (OUTPATIENT)
Dept: LAB | Age: 56
Discharge: HOME | End: 2021-04-13
Payer: COMMERCIAL

## 2021-04-13 DIAGNOSIS — Z01.818 PREOP EXAMINATION: ICD-10-CM

## 2021-04-13 DIAGNOSIS — Z01.818 PREOP EXAMINATION: Primary | ICD-10-CM

## 2021-04-13 LAB
ANION GAP SERPL CALC-SCNC: 5 MMOL/L (ref 3–18)
BASOPHILS # BLD: 0.1 K/UL (ref 0–0.1)
BASOPHILS NFR BLD: 1 % (ref 0–2)
BUN SERPL-MCNC: 15 MG/DL (ref 7–18)
BUN/CREAT SERPL: 19 (ref 12–20)
CALCIUM SERPL-MCNC: 9.1 MG/DL (ref 8.5–10.1)
CHLORIDE SERPL-SCNC: 106 MMOL/L (ref 100–111)
CO2 SERPL-SCNC: 30 MMOL/L (ref 21–32)
CREAT SERPL-MCNC: 0.77 MG/DL (ref 0.6–1.3)
DIFFERENTIAL METHOD BLD: ABNORMAL
EOSINOPHIL # BLD: 0.4 K/UL (ref 0–0.4)
EOSINOPHIL NFR BLD: 4 % (ref 0–5)
ERYTHROCYTE [DISTWIDTH] IN BLOOD BY AUTOMATED COUNT: 12.5 % (ref 11.6–14.5)
GLUCOSE SERPL-MCNC: 104 MG/DL (ref 74–99)
HCT VFR BLD AUTO: 40.4 % (ref 35–45)
HGB BLD-MCNC: 13.3 G/DL (ref 12–16)
LYMPHOCYTES # BLD: 4.3 K/UL (ref 0.9–3.6)
LYMPHOCYTES NFR BLD: 49 % (ref 21–52)
MCH RBC QN AUTO: 34.1 PG (ref 24–34)
MCHC RBC AUTO-ENTMCNC: 32.9 G/DL (ref 31–37)
MCV RBC AUTO: 103.6 FL (ref 74–97)
MONOCYTES # BLD: 0.8 K/UL (ref 0.05–1.2)
MONOCYTES NFR BLD: 9 % (ref 3–10)
NEUTS SEG # BLD: 3.3 K/UL (ref 1.8–8)
NEUTS SEG NFR BLD: 37 % (ref 40–73)
PLATELET # BLD AUTO: 311 K/UL (ref 135–420)
PMV BLD AUTO: 10.1 FL (ref 9.2–11.8)
POTASSIUM SERPL-SCNC: 3.5 MMOL/L (ref 3.5–5.5)
RBC # BLD AUTO: 3.9 M/UL (ref 4.2–5.3)
SODIUM SERPL-SCNC: 141 MMOL/L (ref 136–145)
WBC # BLD AUTO: 8.9 K/UL (ref 4.6–13.2)

## 2021-04-13 PROCEDURE — 36415 COLL VENOUS BLD VENIPUNCTURE: CPT

## 2021-04-13 PROCEDURE — 85025 COMPLETE CBC W/AUTO DIFF WBC: CPT

## 2021-04-13 PROCEDURE — 80048 BASIC METABOLIC PNL TOTAL CA: CPT

## 2021-04-16 DIAGNOSIS — S83.8X1A MENISCAL INJURY, RIGHT, INITIAL ENCOUNTER: ICD-10-CM

## 2021-04-19 ENCOUNTER — OFFICE VISIT (OUTPATIENT)
Dept: ORTHOPEDIC SURGERY | Age: 56
End: 2021-04-19

## 2021-04-19 VITALS
SYSTOLIC BLOOD PRESSURE: 136 MMHG | DIASTOLIC BLOOD PRESSURE: 73 MMHG | WEIGHT: 209.2 LBS | HEART RATE: 72 BPM | BODY MASS INDEX: 37.07 KG/M2 | HEIGHT: 63 IN | TEMPERATURE: 98 F | OXYGEN SATURATION: 99 %

## 2021-04-19 DIAGNOSIS — S83.241A TEAR OF MEDIAL MENISCUS OF RIGHT KNEE, CURRENT, UNSPECIFIED TEAR TYPE, INITIAL ENCOUNTER: Primary | ICD-10-CM

## 2021-04-19 RX ORDER — HYDROCODONE BITARTRATE AND ACETAMINOPHEN 7.5; 325 MG/1; MG/1
1 TABLET ORAL
Qty: 40 TAB | Refills: 0 | Status: SHIPPED | OUTPATIENT
Start: 2021-04-19 | End: 2021-04-26

## 2021-04-19 RX ORDER — MELOXICAM 15 MG/1
15 TABLET ORAL
Qty: 30 TAB | Refills: 2 | Status: SHIPPED | OUTPATIENT
Start: 2021-04-19 | End: 2021-04-20 | Stop reason: CLARIF

## 2021-04-19 RX ORDER — MELOXICAM 15 MG/1
TABLET ORAL
Qty: 30 TAB | Refills: 2 | Status: SHIPPED | OUTPATIENT
Start: 2021-04-19 | End: 2021-04-20 | Stop reason: CLARIF

## 2021-04-19 RX ORDER — MELOXICAM 15 MG/1
TABLET ORAL
Qty: 90 TAB | Refills: 2 | Status: SHIPPED | OUTPATIENT
Start: 2021-04-19 | End: 2021-07-22

## 2021-04-19 NOTE — PATIENT INSTRUCTIONS
Dr. Jonelle Aparicio What is the surgery? - This is an outpatient procedure at either [facility] [address] or [facility] [address] will be completely asleep for procedure. Dr. Stephen Tan will make 2 small incisions in your knee. He will take a tour of your knee with the camera and then address the meniscal tear(s). We will be able to evaluate if any arthritis in your knee but this surgery is not to treat your arthritis. - Total surgery takes about 25-30 mins What can you expect after surgery? - You will have a bulky dressing on your knee that you can remove 2 days after surgery. You will be able to shower 2 days after surgery but no soaking in a bath, hot tub, ocean or pool x 2 weeks to allow for full wound healing. No special brace is needed. - You will be on crutches or a walker when you leave the hospital. You can place weight on your leg as tolerated starting immediately. You are usually on crutches or your walker for about 4-5 days.  
- Even though you can place weight on your leg, we recommend no walking or standing longer than 10mins for the first week. We will gradually increase your activities after that point.   
- Dr. Stephen Tan will start physical therapy for you when you return for your 1 week post op apt - It will take your 6-8 weeks to fully recover from your surgery. When can I return to work? - Most patients return to desk work only after 1-2 weeks. We recommend no prolonged walking or standing, climbing, kneeling, or crawling x 6-8 weeks. Not all knee arthroscopies are the same. The specifics of your individual case will be discussed at length with you by Dr. Stephen Tan and his Physician Assistant. Kai Noble Surgical Coordinator [address] [facility] Shana. Chuckie. [address], [facility] Fan [email or web address] 
P: [phone] F: [phone]

## 2021-04-19 NOTE — PROGRESS NOTES
HISTORY AND PHYSICAL          Patient: Magdiel Sheehan                MRN: [id number]       SSN: [id number]  YOB: 1965          AGE: 50 y.o. SEX: female      Patient scheduled for:  right knee arthroscopic partial medial menisectomy    Surgeon: Dayna Pearce MD    ANESTHESIA TYPE:  General    HISTORY:     The patient was seen in the office today for a preoperative history and physical for an upcoming above listed surgery. The patient is a pleasant 50 y.o. female who has a history of right knee pain. Patient rates pain as 7/10 today. Pain has been present on and off X 2 months. Pt was walking a couple of weeks ago and she experienced an increase in pain. Describes an aching pain with walking. Also notes a burning pain when sitting. Pt works at a . Notes significant limping with walking. Associated symptom of swelling. Also having night pain. Describes night pain as feeling like a toothache. Due to the current findings, affected activity of daily living and continued pain and discomfort, surgical intervention is indicated. The alternatives, risks, and complications, including but not limited to infection, blood loss, need for blood transfusion, neurovascular damage, denis-incisional numbness, subcutaneous hematoma, bone fracture, anesthetic complications, DVT, PE, death, RSD, postoperative stiffness and pain, possible surgical scar, delayed healing and nonhealing, reflexive sympathetic dystrophy, damage to blood vessels and nerves, need for more surgery, MI, and stroke,  failure of hardware, gait disturbances,have been discussed. The patient understands and wishes to proceed with surgery.      PAST MEDICAL HISTORY:     Past Medical History:   Diagnosis Date    Arthritis     Depression     Hiatal hernia     causes heartburn occasionally       CURRENT MEDICATIONS:     Current Outpatient Medications   Medication Sig Dispense Refill    meloxicam (MOBIC) 15 mg tablet TAKE 1 TABLET BY MOUTH DAILY WITH BREAKFAST 90 Tab 2    meloxicam (MOBIC) 15 mg tablet TAKE 1 TABLET BY MOUTH DAILY WITH BREAKFAST 30 Tab 2    meloxicam (Mobic) 15 mg tablet Take 1 Tab by mouth daily (with breakfast). 30 Tab 2    HYDROcodone-acetaminophen (Norco) 7.5-325 mg per tablet Take 1 Tab by mouth every four (4) hours as needed for Pain for up to 7 days. Max Daily Amount: 6 Tabs. Do not take until after surgery (for acute post operative pain) 40 Tab 0    amLODIPine (NORVASC) 5 mg tablet Take 1 Tab by mouth daily. 90 Tab 1    ergocalciferol (ERGOCALCIFEROL) 1,250 mcg (50,000 unit) capsule Take 1 Cap by mouth every seven (7) days. 13 Cap 3    valACYclovir (VALTREX) 500 mg tablet TK 1 T PO QD      BIOTIN PO Take  by mouth.  ascorbic acid, vitamin C, (VITAMIN C) 500 mg tablet Take  by mouth.  cyanocobalamin (VITAMIN B-12) 1,000 mcg tablet Take 1,000 mcg by mouth daily.  ZOLMitriptan (ZOMIG-ZMT) 5 mg disintegrating tablet One tab at HA onset, may repeat x1 > 2 hours if needed. Max 10 mg/24 hours 9 Tab 2    phentermine (ADIPEX-P) 37.5 mg tablet Take 1 Tab by mouth every morning.  Max Daily Amount: 37.5 mg. 30 Tab 0       ALLERGIES:     Allergies   Allergen Reactions    Augmentin [Amoxicillin-Pot Clavulanate] Nausea and Vomiting         SURGICAL HISTORY:     Past Surgical History:   Procedure Laterality Date    COLONOSCOPY N/A 2/27/2019    COL[facility]an [facility]COPY    HX ANKLE FRACTURE TX      HX HYSTEROSCOPY WITH ENDOMETRIAL ABLATION      HX KNEE ARTHROSCOPY Left     HX ORTHOPAEDIC Left     ankle    HX TUBAL LIGATION      TX KNEE SCOPE, ALLOGRAFT IMPANT         SOCIAL HISTORY:     Social History     Socioeconomic History    Marital status:      Spouse name: Not on file    Number of children: Not on file    Years of education: Not on file    Highest education level: Not on file   Tobacco Use    Smoking status: Never Smoker    Smokeless tobacco: Never Used   Substance and Sexual Activity    Alcohol use: No    Drug use: No       FAMILY HISTORY:     Family History   Problem Relation Age of Onset    Cancer Mother     Colon Cancer Mother     Cancer Father     Colon Cancer Brother        REVIEW OF SYSTEMS:     Negative for fevers, chills, chest pain, shortness of breath, weight loss, recent illness     General: Negative for fever and chills. No unexpected change in weight. Denies fatigue. No change in appetite. Skin: Negative for rash or itching. HEENT: Negative for congestion, sore throat, neck pain and neck stiffness. No change in vision or hearing. Hasn't noted any enlarged lymph nodes in the neck. Cardiovascular:  Negative for chest pain and palpitations. Has not noted pedal edema. Respiratory: Negative for cough, colds, sinus, hemoptysis, shortness of breath and wheezing. Gastrointestinal: Negative for nausea and vomiting, rectal bleeding, coffee ground emesis, abdominal pain, diarrhea and constipation. Genitourinary: Negative for dysuria, frequency urgency, or burning on micturition. No flank pain, no foul smelling urine, no difficulty with initiating urination. Hematological: Negative for bleeding or easy bruising. Musculoskeletal: Negative  for arthralgias, back pain or neck pain. Neurological: Negative for dizziness, seizures or syncopal episodes. Denies headaches. Endocrine: Denies excessive thirst.  No heat/cold intolerance. Psychiatric: Negative for depression or insomnia. PHYSICAL EXAMINATION:     VITALS:   Visit Vitals  /73 (BP 1 Location: Right upper arm, BP Patient Position: Sitting, BP Cuff Size: Adult)   Pulse 72   Temp 98 °F (36.7 °C) (Temporal)   Ht 5' 3\" (1.6 m)   Wt 209 lb 3.2 oz (94.9 kg)   SpO2 99%   BMI 37.06 kg/m²     GEN:  Well developed, well nourished 50 y.o. female in no acute distress. HEENT: Normocephalic and atraumatic. Eyes: Conjunctivae and EOM are normal.Pupils are equal, round, and reactive to light. External ear normal appearance, external nose normal appearing. Mouth/Throat: Oropharynx is clear and moist, able to handle oral secretions w/out difficulty, airway patent  NECK: Supple. Normal ROM, No lymphadenopathy. Trachea is midline. No bruising, swelling or deformity  RESP: Clear to auscultation bilaterally. No wheezes, rales, rhonchi. Normal effort and breath sounds. No respiratory distress  CARDIO:  Normal rate, regular rhythm and normal heart sounds. No MGR. ABDOMEN: Soft, non-tender, non-distended, normoactive bowel sounds in all four quadrants. There is no tenderness. There is no rebound and no guarding. BACK: No CVA or spinal tenderness  BREAST:  Deferred  PELVIC:    Deferred   RECTAL:  Deferred   :           Deferred  EXTREMITIES: EXAMINATION OF: right knee  Examination Right knee   Skin Intact   Range of motion 0-130   Effusion +   Medial joint line tenderness +   Lateral joint line tenderness -   Tenderness Pes Bursa -   Tenderness insertion MCL -   Tenderness insertion LCL -   Nishis +   Patella crepitus +   Patella grind -   Lachman -   Pivot shift -   Anterior drawer -   Posterior drawer -   Varus stress -   Valgus stress -   Neurovascular Intact   Calf Swelling and Tenderness to Palpation -   Elizabeth's Test -   Hamstring Cord Tightness -      NEUROVASCULAR: Sensation intact to light touch and strength grossly intact and symmetrical. No nystagmus. Positive distal pulses and capillary refill. DVT ASSESSMENT:  There is not  calf tenderness. No evidence of DVT seen on physical exam.  MOTOR: In tact  PSYCH: Alert an oriented to person, place and time.  Mood, memory, affect, behavior and judgment normal       RADIOGRAPHS & DIAGNOSTIC STUDIES:     MRI/xray reveals :     IMAGING: MRI of right knee dated 3/16/2021 was reviewed and read by Dr. Yousif Molina:   IMPRESSION:   1.  Oblique likely radial tear of the body of the medial meniscus with  displacement of the anterior body and anterior horn toward the medial gutter. 2.  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 3.  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           XR of right knee with 2 views obtained in the office dated 2/22/2021 was reviewed and read by Dr. Ruben Mcintosh: Slight decreased joint space on the medial side          LABS:       @  CBC:   Lab Results   Component Value Date/Time    WBC 8.9 04/13/2021 04:31 PM    RBC 3.90 (L) 04/13/2021 04:31 PM    HGB 13.3 04/13/2021 04:31 PM    HCT 40.4 04/13/2021 04:31 PM    PLATELET 058 01/53/0045 04:31 PM    and BMP:   Lab Results   Component Value Date/Time    Glucose 104 (H) 04/13/2021 04:31 PM    Sodium 141 04/13/2021 04:31 PM    Potassium 3.5 04/13/2021 04:31 PM    Chloride 106 04/13/2021 04:31 PM    CO2 30 04/13/2021 04:31 PM    BUN 15 04/13/2021 04:31 PM    Creatinine 0.77 04/13/2021 04:31 PM    Calcium 9.1 04/13/2021 04:31 PM   @    Preoperative labs were reviewed and are substantially within normal limits         ASSESSMENT:       Encounter Diagnosis   Name Primary?  Tear of medial meniscus of right knee, current, unspecified tear type, initial encounter Yes       PLAN:     Again, the alternatives, risks, and complications, as well as expected outcome were discussed. The patient understands and agrees to proceed with right knee arthroscopic partial medial menisectomy. The patient was counseled at length about the risks of rustam Covid-19 during their perioperative period and any recovery window from their procedure. The patient was made aware that rustam Covid-19  may worsen their prognosis for recovering from their procedure and lend to a higher morbidity and/or mortality risk. All material risks, benefits, and reasonable alternatives including postponing the procedure were discussed. The patient does  wish to proceed with the procedure at this time.    Patient given orders listed below:    Orders Placed This Encounter    meloxicam (Mobic) 15 mg tablet    HYDROcodone-acetaminophen (Norco) 7.5-325 mg per tablet         Mary Mittal PA-C  4/19/2021  3:23 PM

## 2021-04-20 ENCOUNTER — HOSPITAL ENCOUNTER (OUTPATIENT)
Dept: PREADMISSION TESTING | Age: 56
Discharge: HOME | End: 2021-04-20
Payer: COMMERCIAL

## 2021-04-20 PROCEDURE — U0003 INFECTIOUS AGENT DETECTION BY NUCLEIC ACID (DNA OR RNA); SEVERE ACUTE RESPIRATORY SYNDROME CORONAVIRUS 2 (SARS-COV-2) (CORONAVIRUS DISEASE [COVID-19]), AMPLIFIED PROBE TECHNIQUE, MAKING USE OF HIGH THROUGHPUT TECHNOLOGIES AS DESCRIBED BY CMS-2020-01-R: HCPCS

## 2021-04-20 RX ORDER — ACETAMINOPHEN 500 MG
500 TABLET ORAL
COMMUNITY

## 2021-04-20 RX ORDER — FLUTICASONE PROPIONATE 50 MCG
2 SPRAY, SUSPENSION (ML) NASAL DAILY
COMMUNITY

## 2021-04-21 LAB — SARS-COV-2, COV2NT: NOT DETECTED

## 2021-04-21 NOTE — H&P
HISTORY AND PHYSICAL              Patient: Adri Mane                MRN: [id number]       SSN: [id number]  YOB: 1965          AGE: 50 y.o. SEX: female        Patient scheduled for:  right knee arthroscopic partial medial menisectomy    Surgeon: Zhane Palmer MD     ANESTHESIA TYPE:  General     HISTORY:      The patient was seen in the office today for a preoperative history and physical for an upcoming above listed surgery. The patient is a pleasant 50 y.o. female who has a history of right knee pain. Patient rates pain as 7/10 today. Pain has been present on and off X 2 months. Pt was walking a couple of weeks ago and she experienced an increase in pain. Describes an aching pain with walking. Also notes a burning pain when sitting. Pt works at a . Notes significant limping with walking. Associated symptom of swelling. Also having night pain. Describes night pain as feeling like a toothache.      Due to the current findings, affected activity of daily living and continued pain and discomfort, surgical intervention is indicated. The alternatives, risks, and complications, including but not limited to infection, blood loss, need for blood transfusion, neurovascular damage, denis-incisional numbness, subcutaneous hematoma, bone fracture, anesthetic complications, DVT, PE, death, RSD, postoperative stiffness and pain, possible surgical scar, delayed healing and nonhealing, reflexive sympathetic dystrophy, damage to blood vessels and nerves, need for more surgery, MI, and stroke,  failure of hardware, gait disturbances,have been discussed.   The patient understands and wishes to proceed with surgery.      PAST MEDICAL HISTORY:           Past Medical History:   Diagnosis Date    Arthritis      Depression      Hiatal hernia       causes heartburn occasionally         CURRENT MEDICATIONS:             Current Outpatient Medications   Medication Sig Dispense Refill    meloxicam (MOBIC) 15 mg tablet TAKE 1 TABLET BY MOUTH DAILY WITH BREAKFAST 90 Tab 2    meloxicam (MOBIC) 15 mg tablet TAKE 1 TABLET BY MOUTH DAILY WITH BREAKFAST 30 Tab 2    meloxicam (Mobic) 15 mg tablet Take 1 Tab by mouth daily (with breakfast). 30 Tab 2    HYDROcodone-acetaminophen (Norco) 7.5-325 mg per tablet Take 1 Tab by mouth every four (4) hours as needed for Pain for up to 7 days. Max Daily Amount: 6 Tabs. Do not take until after surgery (for acute post operative pain) 40 Tab 0    amLODIPine (NORVASC) 5 mg tablet Take 1 Tab by mouth daily. 90 Tab 1    ergocalciferol (ERGOCALCIFEROL) 1,250 mcg (50,000 unit) capsule Take 1 Cap by mouth every seven (7) days. 13 Cap 3    valACYclovir (VALTREX) 500 mg tablet TK 1 T PO QD        BIOTIN PO Take  by mouth.        ascorbic acid, vitamin C, (VITAMIN C) 500 mg tablet Take  by mouth.        cyanocobalamin (VITAMIN B-12) 1,000 mcg tablet Take 1,000 mcg by mouth daily.        ZOLMitriptan (ZOMIG-ZMT) 5 mg disintegrating tablet One tab at HA onset, may repeat x1 > 2 hours if needed. Max 10 mg/24 hours 9 Tab 2    phentermine (ADIPEX-P) 37.5 mg tablet Take 1 Tab by mouth every morning.  Max Daily Amount: 37.5 mg. 30 Tab 0         ALLERGIES:           Allergies   Allergen Reactions    Augmentin [Amoxicillin-Pot Clavulanate] Nausea and Vomiting            SURGICAL HISTORY:      Past Surgical History:   Procedure Laterality Date    COLONOSCOPY N/A 2/27/2019     [facility] Heather Ellis MD at [facility] ENDOSCOPY    HX ANKLE FRACTURE TX        HX HYSTEROSCOPY WITH ENDOMETRIAL ABLATION        HX KNEE ARTHROSCOPY Left      HX ORTHOPAEDIC Left       ankle    HX TUBAL LIGATION        [place] KNEE SCOPE, ALLOGRAFT IMPANT             SOCIAL HISTORY:      Social History               Socioeconomic History    Marital status:        Spouse name: Not on file    Number of children: Not on file    Years of education: Not on file    Highest education level: Not on file   Tobacco Use    Smoking status: Never Smoker    Smokeless tobacco: Never Used   Substance and Sexual Activity    Alcohol use: No    Drug use: No            FAMILY HISTORY:            Family History   Problem Relation Age of Onset    Cancer Mother      Colon Cancer Mother      Cancer Father      Colon Cancer Brother           REVIEW OF SYSTEMS:      Negative for fevers, chills, chest pain, shortness of breath, weight loss, recent illness      General: Negative for fever and chills. No unexpected change in weight. Denies fatigue. No change in appetite. Skin: Negative for rash or itching. HEENT: Negative for congestion, sore throat, neck pain and neck stiffness. No change in vision or hearing. Hasn't noted any enlarged lymph nodes in the neck. Cardiovascular:  Negative for chest pain and palpitations. Has not noted pedal edema. Respiratory: Negative for cough, colds, sinus, hemoptysis, shortness of breath and wheezing. Gastrointestinal: Negative for nausea and vomiting, rectal bleeding, coffee ground emesis, abdominal pain, diarrhea and constipation. Genitourinary: Negative for dysuria, frequency urgency, or burning on micturition. No flank pain, no foul smelling urine, no difficulty with initiating urination. Hematological: Negative for bleeding or easy bruising. Musculoskeletal: Negative  for arthralgias, back pain or neck pain. Neurological: Negative for dizziness, seizures or syncopal episodes. Denies headaches. Endocrine: Denies excessive thirst.  No heat/cold intolerance. Psychiatric: Negative for depression or insomnia.     PHYSICAL EXAMINATION:      VITALS:   Visit Vitals  /73 (BP 1 Location: Right upper arm, BP Patient Position: Sitting, BP Cuff Size: Adult)   Pulse 72   Temp 98 °F (36.7 °C) (Temporal)   Ht 5' 3\" (1.6 m)   Wt 209 lb 3.2 oz (94.9 kg)   SpO2 99%   BMI 37.06 kg/m²      GEN:  Well developed, well nourished 50 y.o. female in no acute distress. HEENT: Normocephalic and atraumatic. Eyes: Conjunctivae and EOM are normal.Pupils are equal, round, and reactive to light. External ear normal appearance, external nose normal appearing. Mouth/Throat: Oropharynx is clear and moist, able to handle oral secretions w/out difficulty, airway patent  NECK: Supple. Normal ROM, No lymphadenopathy. Trachea is midline. No bruising, swelling or deformity  RESP: Clear to auscultation bilaterally. No wheezes, rales, rhonchi. Normal effort and breath sounds. No respiratory distress  CARDIO:  Normal rate, regular rhythm and normal heart sounds. No MGR. ABDOMEN: Soft, non-tender, non-distended, normoactive bowel sounds in all four quadrants. There is no tenderness. There is no rebound and no guarding. BACK: No CVA or spinal tenderness  BREAST:  Deferred  PELVIC:    Deferred   RECTAL:  Deferred   :           Deferred  EXTREMITIES: EXAMINATION OF: right knee  Examination Right knee   Skin Intact   Range of motion 0-130   Effusion +   Medial joint line tenderness +   Lateral joint line tenderness -   Tenderness Pes Bursa -   Tenderness insertion MCL -   Tenderness insertion LCL -   Nishis +   Patella crepitus +   Patella grind -   Lachman -   Pivot shift -   Anterior drawer -   Posterior drawer -   Varus stress -   Valgus stress -   Neurovascular Intact   Calf Swelling and Tenderness to Palpation -   Elizabeth's Test -   Hamstring Cord Tightness -      NEUROVASCULAR: Sensation intact to light touch and strength grossly intact and symmetrical. No nystagmus. Positive distal pulses and capillary refill. DVT ASSESSMENT:  There is not  calf tenderness. No evidence of DVT seen on physical exam.  MOTOR: In tact  PSYCH: Alert an oriented to person, place and time.  Mood, memory, affect, behavior and judgment normal        RADIOGRAPHS & DIAGNOSTIC STUDIES:      MRI/xray reveals :     IMAGING: MRI of right knee dated 3/16/2021 was reviewed and read by Dr. Silvestre Cruz:   IMPRESSION:   1.  Oblique likely radial tear of the body of the medial meniscus with  displacement of the anterior body and anterior horn toward the medial gutter. 2.  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 3.  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           XR of right knee with 2 views obtained in the office dated 2/22/2021 was reviewed and read by Dr. Royal Muniz: Slight decreased joint space on the medial side              LABS:         @  CBC:         Lab Results   Component Value Date/Time     WBC 8.9 04/13/2021 04:31 PM     RBC 3.90 (L) 04/13/2021 04:31 PM     HGB 13.3 04/13/2021 04:31 PM     HCT 40.4 04/13/2021 04:31 PM     PLATELET 257 28/57/6376 04:31 PM    and BMP:         Lab Results   Component Value Date/Time     Glucose 104 (H) 04/13/2021 04:31 PM     Sodium 141 04/13/2021 04:31 PM     Potassium 3.5 04/13/2021 04:31 PM     Chloride 106 04/13/2021 04:31 PM     CO2 30 04/13/2021 04:31 PM     BUN 15 04/13/2021 04:31 PM     Creatinine 0.77 04/13/2021 04:31 PM     Calcium 9.1 04/13/2021 04:31 PM   @     Preoperative labs were reviewed and are substantially within normal limits            ASSESSMENT:              Encounter Diagnosis   Name Primary?  Tear of medial meniscus of right knee, current, unspecified tear type, initial encounter Yes         PLAN:      Again, the alternatives, risks, and complications, as well as expected outcome were discussed. The patient understands and agrees to proceed with right knee arthroscopic partial medial menisectomy.  The patient was counseled at length about the risks of rustam Covid-19 during their perioperative period and any recovery window from their procedure.  The patient was made aware that rustam Covid-19  may worsen their prognosis for recovering from their procedure and lend to a higher morbidity and/or mortality risk.  All material risks, benefits, and reasonable alternatives including postponing the procedure were discussed. The patient does  wish to proceed with the procedure at this time.    Patient given orders listed below:         Orders Placed This Encounter    meloxicam (Mobic) 15 mg tablet    HYDROcodone-acetaminophen (Norco) 7.5-325 mg per tablet            Rachel Lawson PA-C  4/19/2021  3:23 PM

## 2021-04-22 ENCOUNTER — ANESTHESIA EVENT (OUTPATIENT)
Dept: SURGERY | Age: 56
End: 2021-04-22

## 2021-04-23 ENCOUNTER — HOSPITAL ENCOUNTER (OUTPATIENT)
Age: 56
Setting detail: OUTPATIENT SURGERY
Discharge: HOME | End: 2021-04-23
Attending: ORTHOPAEDIC SURGERY | Admitting: ORTHOPAEDIC SURGERY
Payer: COMMERCIAL

## 2021-04-23 ENCOUNTER — ANESTHESIA (OUTPATIENT)
Dept: SURGERY | Age: 56
End: 2021-04-23

## 2021-04-23 VITALS
HEIGHT: 63 IN | HEART RATE: 74 BPM | SYSTOLIC BLOOD PRESSURE: 110 MMHG | WEIGHT: 208 LBS | OXYGEN SATURATION: 97 % | TEMPERATURE: 98.7 F | DIASTOLIC BLOOD PRESSURE: 59 MMHG | RESPIRATION RATE: 16 BRPM | BODY MASS INDEX: 36.86 KG/M2

## 2021-04-23 LAB — HCG UR QL: NEGATIVE

## 2021-04-23 PROCEDURE — 76010000138 HC OR TIME 0.5 TO 1 HR: Performed by: ORTHOPAEDIC SURGERY

## 2021-04-23 PROCEDURE — 74011250636 HC RX REV CODE- 250/636: Performed by: NURSE ANESTHETIST, CERTIFIED REGISTERED

## 2021-04-23 PROCEDURE — 77030033005 HC TBNG ARTHSC PMP STRY -B: Performed by: ORTHOPAEDIC SURGERY

## 2021-04-23 PROCEDURE — 76210000006 HC OR PH I REC 0.5 TO 1 HR: Performed by: ORTHOPAEDIC SURGERY

## 2021-04-23 PROCEDURE — 74011250636 HC RX REV CODE- 250/636: Performed by: PHYSICIAN ASSISTANT

## 2021-04-23 PROCEDURE — 74011000250 HC RX REV CODE- 250: Performed by: ORTHOPAEDIC SURGERY

## 2021-04-23 PROCEDURE — 2709999900 HC NON-CHARGEABLE SUPPLY: Performed by: ORTHOPAEDIC SURGERY

## 2021-04-23 PROCEDURE — 74011250637 HC RX REV CODE- 250/637: Performed by: NURSE ANESTHETIST, CERTIFIED REGISTERED

## 2021-04-23 PROCEDURE — 77030040361 HC SLV COMPR DVT MDII -B: Performed by: ORTHOPAEDIC SURGERY

## 2021-04-23 PROCEDURE — 77030010509 HC AIRWY LMA MSK TELE -A: Performed by: ANESTHESIOLOGY

## 2021-04-23 PROCEDURE — 81025 URINE PREGNANCY TEST: CPT

## 2021-04-23 PROCEDURE — 77030039266 HC ADH SKN EXOFIN S2SG -A: Performed by: ORTHOPAEDIC SURGERY

## 2021-04-23 PROCEDURE — 74011000250 HC RX REV CODE- 250: Performed by: NURSE ANESTHETIST, CERTIFIED REGISTERED

## 2021-04-23 PROCEDURE — 77030013079 HC BLNKT BAIR HGGR 3M -A: Performed by: ANESTHESIOLOGY

## 2021-04-23 PROCEDURE — 74011250637 HC RX REV CODE- 250/637: Performed by: PHYSICIAN ASSISTANT

## 2021-04-23 PROCEDURE — 77030022036 HC BLD SHV TOMCAT STRY -B: Performed by: ORTHOPAEDIC SURGERY

## 2021-04-23 PROCEDURE — 76060000032 HC ANESTHESIA 0.5 TO 1 HR: Performed by: ORTHOPAEDIC SURGERY

## 2021-04-23 PROCEDURE — 29881 ARTHRS KNE SRG MNISECTMY M/L: CPT | Performed by: ORTHOPAEDIC SURGERY

## 2021-04-23 PROCEDURE — 77030000032 HC CUF TRNQT ZIMM -B: Performed by: ORTHOPAEDIC SURGERY

## 2021-04-23 PROCEDURE — 01400 ANES OPN/ARTHRS KNEE JT NOS: CPT | Performed by: ANESTHESIOLOGY

## 2021-04-23 PROCEDURE — 76210000021 HC REC RM PH II 0.5 TO 1 HR: Performed by: ORTHOPAEDIC SURGERY

## 2021-04-23 PROCEDURE — 74011250636 HC RX REV CODE- 250/636: Performed by: ORTHOPAEDIC SURGERY

## 2021-04-23 PROCEDURE — 77030002933 HC SUT MCRYL J&J -A: Performed by: ORTHOPAEDIC SURGERY

## 2021-04-23 RX ORDER — KETOROLAC TROMETHAMINE 15 MG/ML
INJECTION, SOLUTION INTRAMUSCULAR; INTRAVENOUS AS NEEDED
Status: DISCONTINUED | OUTPATIENT
Start: 2021-04-23 | End: 2021-04-23 | Stop reason: HOSPADM

## 2021-04-23 RX ORDER — SODIUM CHLORIDE 0.9 % (FLUSH) 0.9 %
5-40 SYRINGE (ML) INJECTION AS NEEDED
Status: DISCONTINUED | OUTPATIENT
Start: 2021-04-23 | End: 2021-04-23 | Stop reason: HOSPADM

## 2021-04-23 RX ORDER — PROPOFOL 10 MG/ML
INJECTION, EMULSION INTRAVENOUS AS NEEDED
Status: DISCONTINUED | OUTPATIENT
Start: 2021-04-23 | End: 2021-04-23 | Stop reason: HOSPADM

## 2021-04-23 RX ORDER — DEXMEDETOMIDINE HYDROCHLORIDE 4 UG/ML
INJECTION, SOLUTION INTRAVENOUS AS NEEDED
Status: DISCONTINUED | OUTPATIENT
Start: 2021-04-23 | End: 2021-04-23 | Stop reason: HOSPADM

## 2021-04-23 RX ORDER — FENTANYL CITRATE 50 UG/ML
50 INJECTION, SOLUTION INTRAMUSCULAR; INTRAVENOUS AS NEEDED
Status: DISCONTINUED | OUTPATIENT
Start: 2021-04-23 | End: 2021-04-23 | Stop reason: HOSPADM

## 2021-04-23 RX ORDER — SODIUM CHLORIDE 9 MG/ML
INJECTION, SOLUTION INTRAVENOUS
Status: COMPLETED | OUTPATIENT
Start: 2021-04-23 | End: 2021-04-23

## 2021-04-23 RX ORDER — DEXAMETHASONE SODIUM PHOSPHATE 4 MG/ML
INJECTION, SOLUTION INTRA-ARTICULAR; INTRALESIONAL; INTRAMUSCULAR; INTRAVENOUS; SOFT TISSUE AS NEEDED
Status: DISCONTINUED | OUTPATIENT
Start: 2021-04-23 | End: 2021-04-23 | Stop reason: HOSPADM

## 2021-04-23 RX ORDER — BUPIVACAINE HYDROCHLORIDE AND EPINEPHRINE 2.5; 5 MG/ML; UG/ML
INJECTION, SOLUTION EPIDURAL; INFILTRATION; INTRACAUDAL; PERINEURAL AS NEEDED
Status: DISCONTINUED | OUTPATIENT
Start: 2021-04-23 | End: 2021-04-23 | Stop reason: HOSPADM

## 2021-04-23 RX ORDER — FENTANYL CITRATE 50 UG/ML
100 INJECTION, SOLUTION INTRAMUSCULAR; INTRAVENOUS
Status: DISCONTINUED | OUTPATIENT
Start: 2021-04-23 | End: 2021-04-23 | Stop reason: HOSPADM

## 2021-04-23 RX ORDER — ONDANSETRON 2 MG/ML
4 INJECTION INTRAMUSCULAR; INTRAVENOUS ONCE
Status: COMPLETED | OUTPATIENT
Start: 2021-04-23 | End: 2021-04-23

## 2021-04-23 RX ORDER — FAMOTIDINE 20 MG/1
20 TABLET, FILM COATED ORAL ONCE
Status: COMPLETED | OUTPATIENT
Start: 2021-04-23 | End: 2021-04-23

## 2021-04-23 RX ORDER — ACETAMINOPHEN 500 MG
1000 TABLET ORAL ONCE
Status: COMPLETED | OUTPATIENT
Start: 2021-04-23 | End: 2021-04-23

## 2021-04-23 RX ORDER — SODIUM CHLORIDE 0.9 % (FLUSH) 0.9 %
5-40 SYRINGE (ML) INJECTION EVERY 8 HOURS
Status: DISCONTINUED | OUTPATIENT
Start: 2021-04-23 | End: 2021-04-23 | Stop reason: HOSPADM

## 2021-04-23 RX ORDER — LIDOCAINE HYDROCHLORIDE 20 MG/ML
INJECTION, SOLUTION EPIDURAL; INFILTRATION; INTRACAUDAL; PERINEURAL AS NEEDED
Status: DISCONTINUED | OUTPATIENT
Start: 2021-04-23 | End: 2021-04-23 | Stop reason: HOSPADM

## 2021-04-23 RX ORDER — ONDANSETRON 2 MG/ML
INJECTION INTRAMUSCULAR; INTRAVENOUS AS NEEDED
Status: DISCONTINUED | OUTPATIENT
Start: 2021-04-23 | End: 2021-04-23 | Stop reason: HOSPADM

## 2021-04-23 RX ORDER — FENTANYL CITRATE 50 UG/ML
INJECTION, SOLUTION INTRAMUSCULAR; INTRAVENOUS AS NEEDED
Status: DISCONTINUED | OUTPATIENT
Start: 2021-04-23 | End: 2021-04-23 | Stop reason: HOSPADM

## 2021-04-23 RX ORDER — SODIUM CHLORIDE, SODIUM LACTATE, POTASSIUM CHLORIDE, CALCIUM CHLORIDE 600; 310; 30; 20 MG/100ML; MG/100ML; MG/100ML; MG/100ML
25 INJECTION, SOLUTION INTRAVENOUS CONTINUOUS
Status: DISCONTINUED | OUTPATIENT
Start: 2021-04-23 | End: 2021-04-23 | Stop reason: HOSPADM

## 2021-04-23 RX ADMIN — ONDANSETRON 4 MG: 2 INJECTION INTRAMUSCULAR; INTRAVENOUS at 15:55

## 2021-04-23 RX ADMIN — ONDANSETRON 4 MG: 2 INJECTION INTRAMUSCULAR; INTRAVENOUS at 17:05

## 2021-04-23 RX ADMIN — SODIUM CHLORIDE, SODIUM LACTATE, POTASSIUM CHLORIDE, AND CALCIUM CHLORIDE 25 ML/HR: 600; 310; 30; 20 INJECTION, SOLUTION INTRAVENOUS at 13:10

## 2021-04-23 RX ADMIN — DEXMEDETOMIDINE HYDROCHLORIDE 10 MCG: 100 INJECTION, SOLUTION, CONCENTRATE INTRAVENOUS at 15:55

## 2021-04-23 RX ADMIN — DEXMEDETOMIDINE HYDROCHLORIDE 8 MCG: 100 INJECTION, SOLUTION, CONCENTRATE INTRAVENOUS at 16:11

## 2021-04-23 RX ADMIN — DEXAMETHASONE SODIUM PHOSPHATE 4 MG: 4 INJECTION, SOLUTION INTRAMUSCULAR; INTRAVENOUS at 15:55

## 2021-04-23 RX ADMIN — ACETAMINOPHEN 1000 MG: 500 TABLET, FILM COATED ORAL at 13:19

## 2021-04-23 RX ADMIN — LIDOCAINE HYDROCHLORIDE 100 MG: 20 INJECTION, SOLUTION EPIDURAL; INFILTRATION; INTRACAUDAL; PERINEURAL at 15:55

## 2021-04-23 RX ADMIN — FAMOTIDINE 20 MG: 20 TABLET ORAL at 13:19

## 2021-04-23 RX ADMIN — DEXMEDETOMIDINE HYDROCHLORIDE 10 MCG: 100 INJECTION, SOLUTION, CONCENTRATE INTRAVENOUS at 15:45

## 2021-04-23 RX ADMIN — FENTANYL CITRATE 25 MCG: 50 INJECTION, SOLUTION INTRAMUSCULAR; INTRAVENOUS at 17:00

## 2021-04-23 RX ADMIN — KETOROLAC TROMETHAMINE 15 MG: 15 INJECTION, SOLUTION INTRAMUSCULAR; INTRAVENOUS at 15:55

## 2021-04-23 RX ADMIN — DEXMEDETOMIDINE HYDROCHLORIDE 6 MCG: 100 INJECTION, SOLUTION, CONCENTRATE INTRAVENOUS at 16:12

## 2021-04-23 RX ADMIN — VANCOMYCIN HYDROCHLORIDE 1000 MG: 1 INJECTION, POWDER, LYOPHILIZED, FOR SOLUTION INTRAVENOUS at 14:31

## 2021-04-23 RX ADMIN — PROPOFOL 400 MG: 10 INJECTION, EMULSION INTRAVENOUS at 15:55

## 2021-04-23 RX ADMIN — SODIUM CHLORIDE 1 G: 900 INJECTION, SOLUTION INTRAVENOUS at 15:58

## 2021-04-23 RX ADMIN — FENTANYL CITRATE 50 MCG: 50 INJECTION, SOLUTION INTRAMUSCULAR; INTRAVENOUS at 16:05

## 2021-04-23 RX ADMIN — FENTANYL CITRATE 50 MCG: 50 INJECTION, SOLUTION INTRAMUSCULAR; INTRAVENOUS at 15:57

## 2021-04-23 RX ADMIN — DEXMEDETOMIDINE HYDROCHLORIDE 6 MCG: 100 INJECTION, SOLUTION, CONCENTRATE INTRAVENOUS at 16:02

## 2021-04-23 NOTE — ANESTHESIA POSTPROCEDURE EVALUATION
Procedure(s):  RIGHT KNEE ARTHROSCOPIC PARTIAL MEDIAL MENISCECTOMY.     general    Anesthesia Post Evaluation      Multimodal analgesia: multimodal analgesia used between 6 hours prior to anesthesia start to PACU discharge  Patient location during evaluation: PACU  Patient participation: complete - patient participated  Level of consciousness: awake and alert  Pain management: adequate  Airway patency: patent  Anesthetic complications: no  Cardiovascular status: acceptable  Respiratory status: acceptable  Hydration status: acceptable  Post anesthesia nausea and vomiting:  controlled  Final Post Anesthesia Temperature Assessment:  Normothermia (36.0-37.5 degrees C)      INITIAL Post-op Vital signs:   Vitals Value Taken Time   /61 04/23/21 1717   Temp 36.4 °C (97.6 °F) 04/23/21 1627   Pulse 70 04/23/21 1721   Resp 15 04/23/21 1721   SpO2 95 % 04/23/21 1721

## 2021-04-23 NOTE — PROGRESS NOTES
Date of Surgery Update:  Luiz Carpenter was seen and examined. History and physical has been reviewed. The patient has been examined. There have been no significant clinical changes since the completion of the originally dated History and Physical.    Signed By: Riana Lagos MD     April 23, 2021 12:27 PM         The above patient was independently seen and examined by myself. The case was then discussed and a proper diagnosis/plan was made. I agree with the above assessment.

## 2021-04-23 NOTE — OP NOTES
Operative Report    Patient: Tiago Jimenez MRN: [id number]  SSN: [id number]    YOB: 1965  Age: 50 y.o. Sex: female       Date of Surgery: 4/23/2021     Preoperative Diagnosis: Tear of medial meniscus of right knee, current, unspecified tear type, initial encounter [S83.241A]  Primary osteoarthritis of right knee [M17.11]     Postoperative Diagnosis: Tear of Medial Meniscus  of Right Knee , current , unspecified Tear, initial encounter [U89.215H]  Primary Osteoarthritis of Right  Knee [M17.11]     Surgeon(s) and Role:     Kalen Nicole MD - Primary    Anesthesia: General     Procedure: Procedure(s):  RIGHT KNEE ARTHROSCOPIC PARTIAL MEDIAL MENISCECTOMY     Procedure in Detail: Patient was taken to the operating room to some general trach anesthesia with anesthesia staff. Placed in a standard arthroscopy kaba the right leg was prepped with ChloraPrep solution draped free sterile field. Anterolateral portal was used as an arthroscopy portal anteromedial portals were portal portals made with 11 blade followed by blunt trochars. Once the arthroscope was in place the knee was systematically evaluated sinus suprapatellar pouch patellofemoral joint with no significant changes are noted. Medial compartment complex tear of the posterior horn of the medial meniscus with a radial tear component was found was debrided using straight basket forceps 3 5 shaver leaving a stable rim. The articular surface grade 3 changes most of the weightbearing surface with no significantly unstable flaps. Anterior crucial ligament was intact. The lateral compartment was pristine with no evidence of tears the articular surface was intact. Fluid was suctioned out the knee was injected with Marcaine the portals were closed with 4-0 Monocryl.   Sterile dressings were applied patient tolerated procedure well was taken to recovery room without problems      Estimated Blood Loss: Minimal    Tourniquet Time: * Missing tourniquet times found for documented tourniquets in lo *      Implants: * No implants in log *            Specimens: * No specimens in log *        Drains: None                Complications: None    Counts: Sponge and needle counts were correct times two.     Signed By:  Sangeeta Gannon MD     2021

## 2021-04-23 NOTE — BRIEF OP NOTE
Brief Postoperative Note    Patient: Tahira Rick  YOB: 1965  MRN: [id number]    Date of Procedure: 4/23/2021     Pre-Op Diagnosis: Tear of medial meniscus of right knee, current, unspecified tear type, initial encounter [S83.241A]  Primary osteoarthritis of right knee [M17.11]    Post-Op Diagnosis: Same as preoperative diagnosis.       Procedure(s):  RIGHT KNEE ARTHROSCOPIC PARTIAL MEDIAL MENISCECTOMY    Surgeon(s):  Rowan Mathis MD    Surgical Assistant: Surg Asst-1: Moris OTERO    Anesthesia: General     Estimated Blood Loss (mL): Minimal    Complications: None    Specimens: * No specimens in log *     Implants: * No implants in log *    Drains: * No LDAs found *    Findings: As above    Electronically Signed by Moira Rebolledo MD on 4/23/2021 at 4:19 PM

## 2021-04-23 NOTE — ANESTHESIA PREPROCEDURE EVALUATION
Relevant Problems   No relevant active problems       Anesthetic History   No history of anesthetic complications            Review of Systems / Medical History  Patient summary reviewed, nursing notes reviewed and pertinent labs reviewed    Pulmonary  Within defined limits                 Neuro/Psych         Psychiatric history     Cardiovascular    Hypertension: well controlled              Exercise tolerance: >4 METS     GI/Hepatic/Renal     GERD: well controlled      Hiatal hernia     Endo/Other        Obesity     Other Findings   Comments: Hx of ORIF l ankle.   Right knee pain             Physical Exam    Airway  Mallampati: II  TM Distance: 4 - 6 cm  Neck ROM: normal range of motion   Mouth opening: Normal     Cardiovascular    Rhythm: regular  Rate: normal         Dental  No notable dental hx    Comments: Has upper l molar needing removal   Pulmonary  Breath sounds clear to auscultation               Abdominal  GI exam deferred       Other Findings            Anesthetic Plan    ASA: 2  Anesthesia type: general          Induction: Intravenous  Anesthetic plan and risks discussed with: Patient

## 2021-04-23 NOTE — DISCHARGE INSTRUCTIONS
DISCHARGE SUMMARY from Nurse    PATIENT INSTRUCTIONS:    After general anesthesia or intravenous sedation, for 24 hours or while taking prescription Narcotics:  · Limit your activities  · Do not drive and operate hazardous machinery  · Do not make important personal or business decisions  · Do  not drink alcoholic beverages  · If you have not urinated within 8 hours after discharge, please contact your surgeon on call. Report the following to your surgeon:  · Excessive pain, swelling, redness or odor of or around the surgical area  · Temperature over 100.5  · Nausea and vomiting lasting longer than 4 hours or if unable to take medications  · Any signs of decreased circulation or nerve impairment to extremity: change in color, persistent  numbness, tingling, coldness or increase pain  · Any questions    What to do at Home:  Recommended activity: {discharge activity:57899}, ***    If you experience any of the following symptoms ***, please follow up with ***. *  Please give a list of your current medications to your Primary Care Provider. *  Please update this list whenever your medications are discontinued, doses are      changed, or new medications (including over-the-counter products) are added. *  Please carry medication information at all times in case of emergency situations. These are general instructions for a healthy lifestyle:    No smoking/ No tobacco products/ Avoid exposure to second hand smoke  Surgeon General's Warning:  Quitting smoking now greatly reduces serious risk to your health.     Obesity, smoking, and sedentary lifestyle greatly increases your risk for illness    A healthy diet, regular physical exercise & weight monitoring are important for maintaining a healthy lifestyle    You may be retaining fluid if you have a history of heart failure or if you experience any of the following symptoms:  Weight gain of 3 pounds or more overnight or 5 pounds in a week, increased swelling in our hands or feet or shortness of breath while lying flat in bed. Please call your doctor as soon as you notice any of these symptoms; do not wait until your next office visit. The discharge information has been reviewed with the {PATIENT PARENT GUARDIAN:55620}. The {PATIENT PARENT GUARDIAN:97780} verbalized understanding. Discharge medications reviewed with the {Dishcarge meds reviewed LSLI:90420} and appropriate educational materials and side effects teaching were provided. ___________________________________________________________________________________________________________________________________Dr. Hairston Knife Knee Arthroscopy  Postoperative Information    You will be given a prescription for pain medication. It may be taken every 4-6 hours as needed for the first 4-5 days. You may elevate your leg and place an ice pack on top of the dressing in  order to prevent swelling. A soft bandage was placed on your knee to soak up blood and fluid. You may take the bandage off two days after surgery. You may have a clear bandage on your incisions that looks like tape. This is surgical superglue. Leave these on unless you are noticing redness or itching. Band-Aids should be used for the first 4-5 days over each incision. There are 2 small incisions in your knee that may be sore and develop bruising over the next several days. This should resolve over the next few weeks. No special care will be needed. You should expect swelling in the area. You may elevate your leg and apply an icepack on top of the dressing to help minimize the swelling. Deep massage to the lower leg may also be utilized. It is safe to take a shower two days after surgery. You may begin bearing weight on your leg with the use of crutches for the first 4-5 days. Apply as much weight as tolerated so that at the end of 4-5 days, you can walk without crutches.  Avoid prolonged walking or standing during the first few weeks after surgery. During your first week please work on gentle range of motion of your knee. Even though your incisions are small, there has been an operation inside and around the knee joint. Complete healing may take several months. If you have a high temperature, unexpected pain, redness or swelling, or any drainage around your knee area, please call my office immediately. Please make an appointment to return to my office in one week.     Dr. Saikna Jeronimo office number [phone]

## 2021-04-29 ENCOUNTER — OFFICE VISIT (OUTPATIENT)
Dept: ORTHOPEDIC SURGERY | Age: 56
End: 2021-04-29
Payer: COMMERCIAL

## 2021-04-29 VITALS
RESPIRATION RATE: 16 BRPM | OXYGEN SATURATION: 99 % | HEIGHT: 63 IN | TEMPERATURE: 97 F | BODY MASS INDEX: 36.68 KG/M2 | HEART RATE: 67 BPM | WEIGHT: 207 LBS

## 2021-04-29 DIAGNOSIS — S83.241A TEAR OF MEDIAL MENISCUS OF RIGHT KNEE, CURRENT, UNSPECIFIED TEAR TYPE, INITIAL ENCOUNTER: Primary | ICD-10-CM

## 2021-04-29 DIAGNOSIS — M17.11 PRIMARY OSTEOARTHRITIS OF RIGHT KNEE: ICD-10-CM

## 2021-04-29 PROCEDURE — 99024 POSTOP FOLLOW-UP VISIT: CPT | Performed by: PHYSICIAN ASSISTANT

## 2021-04-29 NOTE — PROGRESS NOTES
Patient: Ivanna Stephen  YOB: 1965       HISTORY:  The patient presents for reevaluation of her right knee status post arthroscopic partial medial menisectomy with grade III chondromalacia on 4/21/21. Patient is improved, states pain is a 3 out of 10.  she has not gone to physical therapy. Patient denies any fever, chills, chest pain, shortness of breath or calf pain. The remainder of the review of systems is negative. There are no new illness or injuries to report since last seen in the office. No changes in medications, allergies, social or family history. PHYSICAL EXAMINATION:    Visit Vitals  Pulse 67   Temp 97 °F (36.1 °C) (Temporal)   Resp 16   Ht 5' 3\" (1.6 m)   Wt 207 lb (93.9 kg)   SpO2 99%   BMI 36.67 kg/m²     The patient is a well-developed, well-nourished female in no acute distress. The patient is alert and oriented times three. The patient appears to be well groomed. Mood and affect are normal.   ORTHOPEDIC EXAM of Right knee: Inspection: Effusion present,  incisions clean, dry intact, sutures in place  TTP: medial joint line  Range of motion: 0-110 flexion  Stability: Stable  Strength: 5/5  2+ distal pulses    IMPRESSION:  Status post Right knee arthroscopic partial medial menisectomy and grade III chondromalacia. PLAN: Incisions cleaned. Surgery was discussed at length today. Stressed to patient that nothing causes an increase in pain or swelling. Patient is weight bearing as tolerated. OK to d/c use of crutches/walker. Will set up with physical therapy. Patient does not request refill of pain medication. Patient will follow up in 3 weeks.     [facility]

## 2021-05-06 ENCOUNTER — HOSPITAL ENCOUNTER (OUTPATIENT)
Dept: PHYSICAL THERAPY | Age: 56
Discharge: HOME | End: 2021-05-06
Attending: PHYSICIAN ASSISTANT
Payer: COMMERCIAL

## 2021-05-06 PROCEDURE — 97110 THERAPEUTIC EXERCISES: CPT | Performed by: PHYSICAL THERAPIST

## 2021-05-06 PROCEDURE — 97162 PT EVAL MOD COMPLEX 30 MIN: CPT | Performed by: PHYSICAL THERAPIST

## 2021-05-06 NOTE — PROGRESS NOTES
PT DAILY TREATMENT NOTE/KNEE EVAL     Patient Name: Wesley Trimble  Date:2021  : 1965  [x]  Patient  Verified  Payor: [address] [place] [address] Avenue / Plan: [facility] [employer] [facility] / Product Type: Commerical /    In time:8:45  Out time:9:30  Total Treatment Time (min): 45  Visit #: 1 of 8    Treatment Area: Pain in right knee [M25.561]    SUBJECTIVE  Pain Level (0-10 scale): 0/10 today; 0/10 at best; 5/10 at worst.  []constant []intermittent [x]improving []worsening []no change since onset    Any medication changes, allergies to medications, adverse drug reactions, diagnosis change, or new procedure performed?: [x] No    [] Yes (see summary sheet for update)  Subjective functional status/changes:     PLOF: Independent self care, part time day care worker. Limitations to PLOF: She is unable to do yard work. Limited standing - 30 minutes. Has not returned to work. Mechanism of Injury: Arthroscopic surgery for partial medial menisectomy, right knee. DOS 2021. Current symptoms/Complaints: Patient's CC is lack of strength, \"it feels weak\". She notes muscle fatigue in the right lower leg. Does steps into her home one at a time. Previous Treatment/Compliance: None  PMHx/Surgical Hx: Surgery on . Work Hx: Retired [employer]. Part time day care worker. Living Situation: Has 4 steps into her home. Pt Goals: \"Just to strengthen it back up\" \"Learn some exercises I can do at home\". Barriers: []pain []financial []time []transportation []other  Cognition: A & O x 3    Other:    OBJECTIVE/EXAMINATION  Domestic Life: Patient lives alone. Activity/Recreational Limitations: Limited standing, limited outdoor work. Mobility: ambulates without any AD. Self Care: Independent.     20 min [x]Eval                  []Re-Eval     25 min Therapeutic Exercise:  [] See flow sheet :   Rationale: increase ROM and increase strength to improve the patients ability to increase ease of functional ADLs. With   [] TE   [] TA   [] neuro   [] other: Patient Education: [x] Review HEP    [] Progressed/Changed HEP based on:   [] positioning   [] body mechanics   [] transfers   [] heat/ice application    [] other:      Other Objective/Functional Measures:     Physical Therapy Evaluation - Knee    Posture: [] Varus    [x] Valgus    [] Recurvatum        [x] Tibial Torsion    [] Foot Supination    [] Foot Pronation    Describe:     Gait:  [] Normal    [] Abnormal    [x] Antalgic    [] NWB    Device: None    Describe:  Decreased stance time on right with increased right lateral excursion.      ROM / Strength  [] Unable to assess                  AROM                      PROM                   Strength (1-5)    Left Right Left Right Left Right   Hip Flexion          Extension          Abduction          Adduction         Knee Flexion 120 111        Extension +4 -4       Ankle Plantarflexion          Dorsiflexion             Flexibility: [] Unable to assess at this time  Hamstrings:    (L) Tightness= [] WNL   [] Min   [] Mod   [] Severe    (R) Tightness= [] WNL   [] Min   [] Mod   [] Severe  Quadriceps:    (L) Tightness= [] WNL   [] Min   [] Mod   [] Severe    (R) Tightness= [] WNL   [] Min   [] Mod   [] Severe  Gastroc:      (L) Tightness= [] WNL   [] Min   [] Mod   [] Severe    (R) Tightness= [] WNL   [] Min   [] Mod   [] Severe  Other:    Palpation:   Neg/Pos  Neg/Pos  Neg/Pos   Joint Line (-) Quad tendon  Patellar Tendon    Patella  Gastrocnemius (Med/Lat)  Pes Anserinus    Popliteal Fossa  Hamstring tendons(Med/Lat)  MCL/LCL      Optional Tests:  Patellar Positioning (Static)   []L [x]R Normal []L []R Lateral   []L []R Damien Spear      []L []R Medial   []L []R [place]    Patellar Tracking   []L [x]R Glide (Lat)   []L [x]R Tilt (Lat)     []L []R Glide (Med)  []L []R Tilt (Med)      []L []R Tile (Inf)     Patellar Mobility WNL   []L []R Hypermobile []L []R Hypomobile         Girth Measurements:     Cm at midpatella       Left 40.3       Right  41.5         Lachmans  [] Neg    [] Pos Posterior Drawer [] Neg    [] Pos  Pivot Shift  [] Neg    [] Pos Posterior Sag  [] Neg    [] Pos  GABRIEL   [] Neg    [] Pos Michael's Test [] Neg    [] Pos  ALRI   [] Neg    [] Pos Squat   [] Neg    [] Pos  Valgus@ 0 Degrees [] Neg    [x] Pos N[place]-Tex [] Neg    [] Pos  Valgus@ 30 Degrees [] Neg    [x] Pos Patellar Apprehension [] Neg    [] Pos  Varus@ 0 Degrees [x] Neg    [] Pos Copeland's Compression [] Neg    [] Pos  Varus@ 30 Degrees [x] Neg    [] Pos Ely's Test  [] Neg    [] Pos  Apley's Compression [] Neg    [] Pos Herber's Test  [] Neg    [] Pos  Apley's Distraction [] Neg    [] Pos Stroke Test  [] Neg    [] Pos   Anterior Drawer [x] Neg    [] Pos Fluctuation Test [] Neg    [] Pos  Other:                  [] Neg    [] Pos                 Pain Level (0-10 scale) post treatment: 0    ASSESSMENT/Changes in Function: Patient with signs and symptoms consistent with right knee pain with mild weakness and decreased AROM. Pain has been improving since surgery with 0/10 today. She has CC of weakness in the right leg. There is no tenderness to palpation of the right knee. Her gait is mildly antalgic. Functionally, she has been unable to return to work, she notes limited standing and is unable to do any yard work. Patient will continue to benefit from skilled PT services to modify and progress therapeutic interventions, address functional mobility deficits, address ROM deficits, analyze and address soft tissue restrictions, analyze and cue movement patterns, analyze and modify body mechanics/ergonomics and instruct in home and community integration to attain remaining goals. [x]  See Plan of Care  []  See progress note/recertification  []  See Discharge Summary         Progress towards goals / Updated goals:  Short Term Goals: To be accomplished in 1 weeks:  1.   Patient will become proficient in their HEP and will be compliant in performing that program.  Evaluation:   Patient given a written/illustrated HEP.       Long Term Goals: To be accomplished in 4 weeks:  1. Patient's pain level will be 0-1/10 with activity in order to improve patient's ability to perform normal ADLs. Evaluation:  0-5/10  2. Patient will demonstrate 0-120 degrees AROM right knee to increase ease of ADLs. Evaluation:  4-111 degrees. 3. Patient will increase FOTO score to 75 to indicate increased functional mobility. Evaluation:  57  4. Patient will ascend and descend steps with reciprocal pattern to increase ease of entry into home. Evaluation:  Doing steps into her home one at a time.     PLAN  [x]  Upgrade activities as tolerated     [x]  Continue plan of care  []  Update interventions per flow sheet       []  Discharge due to:_  []  Other:_      Ngoc Frederick, PT 5/6/2021  8:20 AM

## 2021-05-10 ENCOUNTER — HOSPITAL ENCOUNTER (OUTPATIENT)
Dept: PHYSICAL THERAPY | Age: 56
Discharge: HOME | End: 2021-05-10
Attending: PHYSICIAN ASSISTANT
Payer: COMMERCIAL

## 2021-05-10 PROCEDURE — 97110 THERAPEUTIC EXERCISES: CPT | Performed by: PHYSICAL THERAPIST

## 2021-05-10 PROCEDURE — 97140 MANUAL THERAPY 1/> REGIONS: CPT | Performed by: PHYSICAL THERAPIST

## 2021-05-10 NOTE — PROGRESS NOTES
PT DAILY TREATMENT NOTE     Patient Name: Teodoro Charles  Date:5/10/2021  : 1965  [x]  Patient  Verified  Payor: [address] [place] [address] Avenue / Plan: [facility] / Product Type: Commerical /    In time:12:32  Out time:1:12  Total Treatment Time (min): 40  Visit #: 2 of 8    Treatment Area: Pain in right knee [M25.561]    SUBJECTIVE  Pain Level (0-10 scale): 1  Any medication changes, allergies to medications, adverse drug reactions, diagnosis change, or new procedure performed?: [x] No    [] Yes (see summary sheet for update)  Subjective functional status/changes:   [] No changes reported  Patient reports she \"overdid it yesterday\". Notes she was standing for prolonged periods of time. OBJECTIVE    32 min Therapeutic Exercise:  [] See flow sheet :   Rationale: increase ROM and increase strength to improve the patients ability to increase her functional activity level. 8 min Manual Therapy:  Patellar mobs left knee; manual stretching HS and quads to increase flexion and extension. The manual therapy interventions were performed at a separate and distinct time from the therapeutic activities interventions. Rationale: decrease pain and increase ROM to increase ease of motion to improve function. With   [] TE   [] TA   [] neuro   [] other: Patient Education: [x] Review HEP    [] Progressed/Changed HEP based on:   [] positioning   [] body mechanics   [] transfers   [] heat/ice application    [] other:      Other Objective/Functional Measures: PROM 0-121 degrees. Pain Level (0-10 scale) post treatment: 0    ASSESSMENT/Changes in Function: Patient with increased AROM/PROM.     Patient will continue to benefit from skilled PT services to modify and progress therapeutic interventions, address functional mobility deficits, address ROM deficits, analyze and address soft tissue restrictions, analyze and cue movement patterns, analyze and modify body mechanics/ergonomics and instruct in home and community integration to attain remaining goals. [x]  See Plan of Care  []  See progress note/recertification  []  See Discharge Summary         Progress towards goals / Updated goals:  Short Term Goals: To be accomplished in 1 weeks:  1.  Patient will become proficient in their HEP and will be compliant in performing that program.  Evaluation:   Patient given a written/illustrated HEP. Current:  Reports compliance with her HEP.  5/10/2021. Goal Met.      Long Term Goals: To be accomplished in 4 weeks:  1. Patient's pain level will be 0-1/10 with activity in order to improve patient's ability to perform normal ADLs. Evaluation:  0-5/10  2. Patient will demonstrate 0-120 degrees AROM right knee to increase ease of ADLs. Evaluation:  4-111 degrees. 3. Patient will increase FOTO score to 75 to indicate increased functional mobility. Evaluation:  57  4. Patient will ascend and descend steps with reciprocal pattern to increase ease of entry into home. Evaluation:  Doing steps into her home one at a time. Current:  Notes she is able to do steps reciprocally. 5/10/2021.   Goal Met.       PLAN  [x]  Upgrade activities as tolerated     [x]  Continue plan of care  []  Update interventions per flow sheet       []  Discharge due to:_  []  Other:_      Jovita Mariano PT 5/10/2021  12:39 PM    Future Appointments   Date Time Provider Feliberto Muñiz   5/21/2021 11:00 AM Yesika Pryor PT [facility]   5/27/2021 11:00 AM KIRSTIN Chatman [facility]   5/28/2021 11:00 AM Yesika Pryor PT [facility]   6/2/2021 11:00 AM Yesika Pryor PT [facility]

## 2021-05-21 ENCOUNTER — HOSPITAL ENCOUNTER (OUTPATIENT)
Dept: PHYSICAL THERAPY | Age: 56
Discharge: HOME | End: 2021-05-21
Attending: PHYSICIAN ASSISTANT
Payer: COMMERCIAL

## 2021-05-21 PROCEDURE — 97110 THERAPEUTIC EXERCISES: CPT | Performed by: PHYSICAL THERAPIST

## 2021-05-21 PROCEDURE — 97140 MANUAL THERAPY 1/> REGIONS: CPT | Performed by: PHYSICAL THERAPIST

## 2021-05-21 PROCEDURE — 97112 NEUROMUSCULAR REEDUCATION: CPT | Performed by: PHYSICAL THERAPIST

## 2021-05-21 NOTE — PROGRESS NOTES
PT DAILY TREATMENT NOTE     Patient Name: Augustin Barney  Date:2021  : 1965  [x]  Patient  Verified  Payor: [facility] [address] / Plan: [facility] / Product Type: Commerical /    In time:11:15  Out time:11:59  Total Treatment Time (min): 44  Visit #: 3 of 8    Treatment Area: Pain in right knee [M25.561]    SUBJECTIVE  Pain Level (0-10 scale): 2  Any medication changes, allergies to medications, adverse drug reactions, diagnosis change, or new procedure performed?: [x] No    [] Yes (see summary sheet for update)  Subjective functional status/changes:   [] No changes reported  Patient with c/o calf discomfort and tightness when walking. She notes a clicking in her knee with motion    OBJECTIVE    23 min Therapeutic Exercise:  [] See flow sheet :Emphasis on increasing AROM and LE strength. Rationale: increase ROM and increase strength to improve the patients ability to increase her functional activity level. 11 min Neuromuscular Re-education:  []  See flow sheet :Emphasis on recruitment and activation of quads and gluteal muscles to promote proprioception and kinesthetic awareness. Rationale: increase strength, improve coordination and increase proprioception  to improve the patients ability to increase ease of community ambulation. 10 min Manual Therapy:  Patellar mobs right knee; manual stretching HS and quads. Soft tissue mobilization right calf with The Stick. The manual therapy interventions were performed at a separate and distinct time from the therapeutic activities interventions. Rationale: decrease pain, increase ROM, increase tissue extensibility and decrease trigger points to increase ease of motion to improve function.           With   [] TE   [] TA   [] neuro   [] other: Patient Education: [x] Review HEP    [] Progressed/Changed HEP based on:   [] positioning   [] body mechanics   [] transfers   [] heat/ice application    [] other: Other Objective/Functional Measures: Tenderness along the lateral calf. Gait is without deviaiton. Pain Level (0-10 scale) post treatment: 0    ASSESSMENT/Changes in Function: Patient with improving function with decreased subjective c/o pain. Patient will continue to benefit from skilled PT services to modify and progress therapeutic interventions, address functional mobility deficits, address ROM deficits, analyze and address soft tissue restrictions, analyze and cue movement patterns, analyze and modify body mechanics/ergonomics and instruct in home and community integration to attain remaining goals. []  See Plan of Care  []  See progress note/recertification  []  See Discharge Summary         Progress towards goals / Updated goals:  Short Term Goals: To be accomplished in 1 weeks:  1.  Patient will become proficient in their HEP and will be compliant in performing that program.  Evaluation:   Patient given a written/illustrated HEP. Current:  Reports compliance with her HEP.  5/10/2021. Goal Met.      Long Term Goals: To be accomplished in 4 weeks:  1. Patient's pain level will be 0-1/10 with activity in order to improve patient's ability to perform normal ADLs. Evaluation:  0-5/10  Current:  0-2/10.  5/21/21. Progressing. 2. Patient will demonstrate 0-120 degrees AROM right knee to increase ease of ADLs. Evaluation:  4-111 degrees. 3. Patient will increase FOTO score to 75 to indicate increased functional mobility. Evaluation:  57  4. Patient will ascend and descend steps with reciprocal pattern to increase ease of entry into home. Evaluation:  Doing steps into her home one at a time. Current:  Notes she is able to do steps reciprocally. 5/10/2021. Goal Met.     PLAN  []  Upgrade activities as tolerated     []  Continue plan of care  []  Update interventions per flow sheet       []  Discharge due to:_  []  Other:_      Jovita Mariano, PT 5/21/2021  11:42 AM    Future Appointments Date Time Provider Feliberto Muñiz   5/27/2021 11:00 AM Nabeel Merino [place] [facility] AM[facility]   5/28/2021 11:00 AM Den Stagers, PT [facility]   6/2/2021 11:00 AM Den Dunbar, PT [facility]

## 2021-05-27 ENCOUNTER — OFFICE VISIT (OUTPATIENT)
Dept: ORTHOPEDIC SURGERY | Age: 56
End: 2021-05-27
Payer: COMMERCIAL

## 2021-05-27 VITALS
OXYGEN SATURATION: 98 % | WEIGHT: 211 LBS | HEART RATE: 71 BPM | TEMPERATURE: 97.1 F | BODY MASS INDEX: 37.39 KG/M2 | HEIGHT: 63 IN

## 2021-05-27 DIAGNOSIS — M17.11 PRIMARY OSTEOARTHRITIS OF RIGHT KNEE: ICD-10-CM

## 2021-05-27 DIAGNOSIS — S83.241A TEAR OF MEDIAL MENISCUS OF RIGHT KNEE, CURRENT, UNSPECIFIED TEAR TYPE, INITIAL ENCOUNTER: Primary | ICD-10-CM

## 2021-05-27 PROCEDURE — 99024 POSTOP FOLLOW-UP VISIT: CPT | Performed by: PHYSICIAN ASSISTANT

## 2021-05-27 NOTE — PROGRESS NOTES
Patient: Emanuel Jeff  YOB: 1965       HISTORY:  The patient presents for reevaluation of her right knee status post arthroscopic partial medial menisectomy with grade III chondromalacia on 4/21/21. Patient is improved, states pain is a 1 out of 10. Occasional popping sensation that is not painful.  she has not gone to physical therapy. Patient denies any fever, chills, chest pain, shortness of breath or calf pain. The remainder of the review of systems is negative. There are no new illness or injuries to report since last seen in the office. No changes in medications, allergies, social or family history. PHYSICAL EXAMINATION:    Visit Vitals  Pulse 71   Temp 97.1 °F (36.2 °C) (Skin)   Ht 5' 3\" (1.6 m)   Wt 211 lb (95.7 kg)   SpO2 98%   BMI 37.38 kg/m²     The patient is a well-developed, well-nourished female in no acute distress. The patient is alert and oriented times three. The patient appears to be well groomed. Mood and affect are normal.   ORTHOPEDIC EXAM of Right knee: Inspection: Effusion not present,  incisions well healed  TTP: none  Range of motion: 0-120 flexion  Stability: Stable  Strength: 5/5  2+ distal pulses    IMPRESSION:  Status post Right knee arthroscopic partial medial menisectomy and grade III chondromalacia. PLAN:   1. Patient doing well post operatively  2. Will cont to gradually increase activities  3.  Stressed no increases in pain    RTC PRN    TADE[facility] ADMIT

## 2021-05-28 ENCOUNTER — APPOINTMENT (OUTPATIENT)
Dept: PHYSICAL THERAPY | Age: 56
End: 2021-05-28
Attending: PHYSICIAN ASSISTANT
Payer: COMMERCIAL

## 2021-06-02 ENCOUNTER — APPOINTMENT (OUTPATIENT)
Dept: PHYSICAL THERAPY | Age: 56
End: 2021-06-02
Attending: PHYSICIAN ASSISTANT

## 2021-06-02 NOTE — PROGRESS NOTES
[facility]              [address]        [place], [address]   [phone] fax    Discharge Summary  Patient name: Patti Conte Start of Care: 2021   Referral source: Hieu Marie Lorenzomanoloma : 1965   Medical/Treatment Diagnosis: Pain in right knee [M25.561]  Payor: [address] [place] [address] / Plan:  [address] / Product Type: Commerical /  Onset Date:2021     Prior Hospitalization: see medical history Provider#: [id number]   Medications: Verified on Patient Summary List    Comorbidities: Arthritis, BMI 36.7, Depression, Headaches, HBP. Prior Level of Function: Independent self care, part time day care worker. Likes to do yard work. Visits from Start of Care: 3    Missed Visits: 0  Reporting Period : 2021 to 2021    Summary of Care:  Short Term Goals: To be accomplished in 1 weeks:  1.  Patient will become proficient in their HEP and will be compliant in performing that program.  Evaluation:   Patient given a written/illustrated HEP. Current:  Reports compliance with her HEP.  5/10/2021Alethea Colon Met.      Long Term Goals: To be accomplished in 4 weeks:  1. Patient's pain level will be 0-1/10 with activity in order to improve patient's ability to perform normal ADLs. Evaluation:  0-5/10  Current:  0-2/10.  21. Progressing. 2. Patient will demonstrate 0-120 degrees AROM right knee to increase ease of ADLs. Evaluation:  4-111 degrees. 3. Patient will increase FOTO score to 75 to indicate increased functional mobility. Evaluation:  57  4. Patient will ascend and descend steps with reciprocal pattern to increase ease of entry into home. Evaluation:  Doing steps into her home one at a time. Current:  Notes she is able to do steps reciprocally.  5/10/2021.  Goal Met. ASSESSMENT/RECOMMENDATIONS:  [x]Discontinue therapy: []Patient has self discharged.       []Patient is non-compliant or has abdicated      []Due to lack of appreciable progress towards set goals    Lv Figueredo, PT 6/2/2021 12:45 PM

## 2021-06-23 DIAGNOSIS — I10 ESSENTIAL HYPERTENSION: ICD-10-CM

## 2021-06-24 RX ORDER — AMLODIPINE BESYLATE 5 MG/1
TABLET ORAL
Qty: 90 TABLET | Refills: 1 | Status: SHIPPED | OUTPATIENT
Start: 2021-06-24 | End: 2021-10-26

## 2021-07-22 DIAGNOSIS — S83.8X1A MENISCAL INJURY, RIGHT, INITIAL ENCOUNTER: ICD-10-CM

## 2021-07-22 RX ORDER — MELOXICAM 15 MG/1
TABLET ORAL
Qty: 30 TABLET | Refills: 1 | Status: SHIPPED | OUTPATIENT
Start: 2021-07-22 | End: 2021-07-22 | Stop reason: SDUPTHER

## 2021-07-22 NOTE — TELEPHONE ENCOUNTER
Per pharmacy - patient is requesting a 90 d/s    Requested Prescriptions     Pending Prescriptions Disp Refills    meloxicam (MOBIC) 15 mg tablet 90 Tablet 0     Sig: Take 1 Tablet by mouth daily (with breakfast).      Signed Prescriptions Disp Refills    meloxicam (MOBIC) 15 mg tablet 30 Tablet 1     Sig: TAKE 1 TABLET BY MOUTH DAILY WITH BREAKFAST     Authorizing Provider: Danilo Vazquez

## 2021-07-23 RX ORDER — MELOXICAM 15 MG/1
15 TABLET ORAL
Qty: 90 TABLET | Refills: 0 | Status: SHIPPED | OUTPATIENT
Start: 2021-07-23 | End: 2021-12-10

## 2021-09-22 ENCOUNTER — OFFICE VISIT (OUTPATIENT)
Dept: FAMILY MEDICINE CLINIC | Age: 56
End: 2021-09-22
Payer: COMMERCIAL

## 2021-09-22 VITALS
RESPIRATION RATE: 18 BRPM | HEART RATE: 76 BPM | OXYGEN SATURATION: 97 % | BODY MASS INDEX: 37.03 KG/M2 | HEIGHT: 63 IN | SYSTOLIC BLOOD PRESSURE: 120 MMHG | DIASTOLIC BLOOD PRESSURE: 75 MMHG | TEMPERATURE: 98.1 F | WEIGHT: 209 LBS

## 2021-09-22 DIAGNOSIS — E66.9 OBESITY (BMI 30-39.9): ICD-10-CM

## 2021-09-22 DIAGNOSIS — I10 ESSENTIAL HYPERTENSION: Primary | ICD-10-CM

## 2021-09-22 DIAGNOSIS — G43.909 MIGRAINE WITHOUT STATUS MIGRAINOSUS, NOT INTRACTABLE, UNSPECIFIED MIGRAINE TYPE: ICD-10-CM

## 2021-09-22 DIAGNOSIS — Z12.31 ENCOUNTER FOR SCREENING MAMMOGRAM FOR MALIGNANT NEOPLASM OF BREAST: ICD-10-CM

## 2021-09-22 PROCEDURE — 99213 OFFICE O/P EST LOW 20 MIN: CPT | Performed by: FAMILY MEDICINE

## 2021-09-22 NOTE — PROGRESS NOTES
Chief Complaint   Patient presents with    Follow Up Chronic Condition     BP     1. Have you been to the ER, urgent care clinic since your last visit? Hospitalized since your last visit? No    2. Have you seen or consulted any other health care providers outside of the [facility] since your last visit? Include any pap smears or colon screening.  No

## 2021-09-22 NOTE — PROGRESS NOTES
HPI  Nabila Gil comes in for f/u care. HTN: patient has hypertension, BP is stable, she denies headache, changes in vision or focal weakness. She is on amlodipine, continue current treatment plan. Migraine headache: Patient has a history of migraine headaches. Previously was on Zomig but no longer taking the medication. She is followed up with a neurologist.  Has a follow-up appointment. Headaches have improved and now only sporadic. She will continue with supportive care. Obesity:Patient has obesity with BMI of 37.02, she will intensify lifestyle and dietary modification. HM: Referral placed for mammogam      Past Medical History  Past Medical History:   Diagnosis Date    Arthritis     Coagulation disorder ([facility] [address]     Current tear knee, medial meniscus     Right knee    Depression     H/O seasonal allergies     Hiatal hernia     causes heartburn occasionally    HSV infection     HTN (hypertension)        Surgical History  Past Surgical History:   Procedure Laterality Date    COLONOSCOPY N/A 2/27/2019    [facility]    HX ANKLE FRACTURE TX      HX HYSTEROSCOPY WITH ENDOMETRIAL ABLATION      HX KNEE ARTHROSCOPY Left     HX KNEE ARTHROSCOPY Right     HX ORTHOPAEDIC Left     ankle    HX TUBAL LIGATION      FL KNEE SCOPE, ALLOGRAFT IMPANT          Medications  Current Outpatient Medications   Medication Sig Dispense Refill    meloxicam (MOBIC) 15 mg tablet Take 1 Tablet by mouth daily (with breakfast). 90 Tablet 0    amLODIPine (NORVASC) 5 mg tablet TAKE 1 TABLET BY MOUTH DAILY 90 Tablet 1    acetaminophen (TYLENOL) 500 mg tablet Take 500 mg by mouth every six (6) hours as needed for Pain.  fluticasone propionate (Flonase Allergy Relief) 50 mcg/actuation nasal spray 2 Sprays by Both Nostrils route daily.  valACYclovir (VALTREX) 500 mg tablet TK 1 T PO QD      BIOTIN PO Take  by mouth.       cyanocobalamin (VITAMIN B-12) 1,000 mcg tablet Take 1,000 mcg by mouth daily.  ergocalciferol (ERGOCALCIFEROL) 1,250 mcg (50,000 unit) capsule Take 1 Cap by mouth every seven (7) days. (Patient not taking: Reported on 9/22/2021) 13 Cap 3    ascorbic acid, vitamin C, (VITAMIN C) 500 mg tablet Take  by mouth. (Patient not taking: Reported on 9/22/2021)      ZOLMitriptan (ZOMIG-ZMT) 5 mg disintegrating tablet One tab at HA onset, may repeat x1 > 2 hours if needed. Max 10 mg/24 hours (Patient not taking: Reported on 9/22/2021) 9 Tab 2       Allergies  Allergies   Allergen Reactions    Augmentin [Amoxicillin-Pot Clavulanate] Nausea and Vomiting       Family History  Family History   Problem Relation Age of Onset    Cancer Mother     Colon Cancer Mother     Cancer Father     Colon Cancer Brother        Social History  Social History     Socioeconomic History    Marital status:      Spouse name: Not on file    Number of children: Not on file    Years of education: Not on file    Highest education level: Not on file   Occupational History    Not on file   Tobacco Use    Smoking status: Never Smoker    Smokeless tobacco: Never Used   Vaping Use    Vaping Use: Never used   Substance and Sexual Activity    Alcohol use: No    Drug use: Never    Sexual activity: Yes     Partners: Male   Other Topics Concern    Not on file   Social History Narrative    Not on file     Social Determinants of Health     Financial Resource Strain:     Difficulty of Paying Living Expenses:    Food Insecurity:     Worried About Running Out of Food in the Last Year:     Ran Out of Food in the Last Year:    Transportation Needs:     Lack of Transportation (Medical):      Lack of Transportation (Non-Medical):    Physical Activity:     Days of Exercise per Week:     Minutes of Exercise per Session:    Stress:     Feeling of Stress :    Social Connections:     Frequency of Communication with Friends and Family:     Frequency of Social Gatherings with Friends and Family:     Attends Worship Services:     Active Member of Clubs or Organizations:     Attends Club or Organization Meetings:     Marital Status:    Intimate Partner Violence:     Fear of Current or Ex-Partner:     Emotionally Abused:     Physically Abused:     Sexually Abused:        Review of Systems  Review of Systems - Review of all systems is negative except as noted above in the HPI. Vital Signs  Visit Vitals  /75 (BP 1 Location: Left upper arm, BP Patient Position: Sitting, BP Cuff Size: Adult)   Pulse 76   Temp 98.1 °F (36.7 °C) (Oral)   Resp 18   Ht 5' 3\" (1.6 m)   Wt 209 lb (94.8 kg)   SpO2 97%   BMI 37.02 kg/m²         Physical Exam  Physical Examination: General appearance - alert, well appearing, and in no distress, oriented to person, place, and time and overweight  Mental status - alert, oriented to person, place, and time, affect appropriate to mood  Mouth - mucous membranes moist, pharynx normal without lesions  Neck - supple, no significant adenopathy  Lymphatics - no palpable lymphadenopathy, no hepatosplenomegaly  Chest - clear to auscultation, no wheezes, rales or rhonchi, symmetric air entry  Heart - normal rate and regular rhythm, S1 and S2 normal  Abdomen - soft, nontender, nondistended, no masses or organomegaly  Back exam - limited range of motion  Neurological - motor and sensory grossly normal bilaterally  Musculoskeletal - full range of motion without pain  Extremities - intact peripheral pulses      Results  Results for orders placed or performed during the hospital encounter of 04/23/21   NOVEL CORONAVIRUS (COVID-19)   Result Value Ref Range    SARS-CoV-2 Not Detected Not Detected     HCG URINE, QL. - POC   Result Value Ref Range    Pregnancy test,urine (POC) Negative NEG         ASSESSMENT and PLAN    ICD-10-CM ICD-9-CM    1. Essential hypertension  I10 401.9    2. Obesity (BMI 30-39. 9)  E66.9 278.00    3.  Migraine without status migrainosus, not intractable, unspecified migraine type  G43.909 346.90    4. Encounter for screening mammogram for malignant neoplasm of breast  Z12.31 V76.12 HIRAM MAMMO BI SCREENING INCL CAD     lab results and schedule of future lab studies reviewed with patient  reviewed diet, exercise and weight control  cardiovascular risk and specific lipid/LDL goals reviewed  reviewed medications and side effects in detail      I have discussed the diagnosis with the patient and the intended plan of care as seen in the above orders. The patient has received an after-visit summary and questions were answered concerning future plans. I have discussed medication, side effects, and warnings with the patient in detail. The patient verbalized understanding and is in agreement with the plan of care. The patient will contact the office with any additional concerns. Doreen Paredes MD    PLEASE NOTE:   This document has been produced using voice recognition software.  Unrecognized errors in transcription may be present

## 2021-10-26 DIAGNOSIS — I10 ESSENTIAL HYPERTENSION: ICD-10-CM

## 2021-10-26 RX ORDER — AMLODIPINE BESYLATE 5 MG/1
TABLET ORAL
Qty: 90 TABLET | Refills: 1 | Status: SHIPPED | OUTPATIENT
Start: 2021-10-26 | End: 2022-06-22 | Stop reason: SDUPTHER

## 2021-11-12 LAB — PAP SMEAR, EXTERNAL: NEGATIVE

## 2022-01-25 ENCOUNTER — OFFICE VISIT (OUTPATIENT)
Dept: FAMILY MEDICINE CLINIC | Age: 57
End: 2022-01-25
Payer: COMMERCIAL

## 2022-01-25 VITALS
OXYGEN SATURATION: 95 % | BODY MASS INDEX: 37.03 KG/M2 | SYSTOLIC BLOOD PRESSURE: 123 MMHG | WEIGHT: 209 LBS | HEIGHT: 63 IN | RESPIRATION RATE: 22 BRPM | TEMPERATURE: 98.5 F | DIASTOLIC BLOOD PRESSURE: 83 MMHG | HEART RATE: 83 BPM

## 2022-01-25 DIAGNOSIS — G89.29 WRIST PAIN, CHRONIC, LEFT: ICD-10-CM

## 2022-01-25 DIAGNOSIS — M25.532 WRIST PAIN, CHRONIC, LEFT: ICD-10-CM

## 2022-01-25 DIAGNOSIS — R09.81 SINUS CONGESTION: ICD-10-CM

## 2022-01-25 DIAGNOSIS — I10 ESSENTIAL HYPERTENSION: Primary | ICD-10-CM

## 2022-01-25 PROCEDURE — 99213 OFFICE O/P EST LOW 20 MIN: CPT | Performed by: FAMILY MEDICINE

## 2022-01-25 NOTE — PROGRESS NOTES
HPI  Yin Billy comes in  rf/u care. HTN: Patient has hypertension. Blood pressure is stable. Denies headache, changes in vision or focal weakness. She takes amlodipine. We will continue with the current treatment plan. Wrist pain: Patient bumped her left wrist and now has pain especially medial aspect of the wrist.  No swelling or redness. Discussed doing an x-ray but she prefers to hold off on this. There is no limitation in range of motion. She will do supportive care. She will take meloxicam or Tylenol as needed for pain. If symptoms persist she will come in for further evaluation. Sinus congestion: Patient has sinus congestion. This is mainly in the frontal and maxillary sinuses. She has postnasal drainage that is clear in nature. The sinus congestion has been chronic for her and she has seen ENT specialist in the past.  We discussed management options. I will have her take Flonase. She can also take Claritin. If symptoms persist may need to see the ENT. She will let us know.       Past Medical History  Past Medical History:   Diagnosis Date    Arthritis     Coagulation disorder ([facility] [address]     Current tear knee, medial meniscus     Right knee    Depression     H/O seasonal allergies     Hiatal hernia     causes heartburn occasionally    HSV infection     HTN (hypertension)        Surgical History  Past Surgical History:   Procedure Laterality Date    COLONOSCOPY N/A 2/27/2019    [facility] at [facility] ENDOSCOPY    HX ANKLE FRACTURE TX      HX HYSTEROSCOPY WITH ENDOMETRIAL ABLATION      HX KNEE ARTHROSCOPY Left     HX KNEE ARTHROSCOPY Right     HX ORTHOPAEDIC Left     ankle    HX TUBAL LIGATION      [place] KNEE SCOPE, ALLOGRAFT IMPANT          Medications  Current Outpatient Medications   Medication Sig Dispense Refill    meloxicam (MOBIC) 15 mg tablet TAKE ONE TABLET BY MOUTH DAILY WITH BREAKFAST 90 Tablet 0    amLODIPine (NORVASC) 5 mg tablet TAKE 1 TABLET BY MOUTH DAILY 90 Tablet 1    acetaminophen (TYLENOL) 500 mg tablet Take 500 mg by mouth every six (6) hours as needed for Pain.  valACYclovir (VALTREX) 500 mg tablet TK 1 T PO QD      BIOTIN PO Take  by mouth.  cyanocobalamin (VITAMIN B-12) 1,000 mcg tablet Take 1,000 mcg by mouth daily.  fluticasone propionate (Flonase Allergy Relief) 50 mcg/actuation nasal spray 2 Sprays by Both Nostrils route daily. (Patient not taking: Reported on 1/25/2022)         Allergies  Allergies   Allergen Reactions    Augmentin [Amoxicillin-Pot Clavulanate] Nausea and Vomiting       Family History  Family History   Problem Relation Age of Onset    Cancer Mother     Colon Cancer Mother     Cancer Father     Colon Cancer Brother        Social History  Social History     Socioeconomic History    Marital status:      Spouse name: Not on file    Number of children: Not on file    Years of education: Not on file    Highest education level: Not on file   Occupational History    Not on file   Tobacco Use    Smoking status: Never Smoker    Smokeless tobacco: Never Used   Vaping Use    Vaping Use: Never used   Substance and Sexual Activity    Alcohol use: No    Drug use: Never    Sexual activity: Yes     Partners: Male   Other Topics Concern    Not on file   Social History Narrative    Not on file     Social Determinants of Health     Financial Resource Strain:     Difficulty of Paying Living Expenses: Not on file   Food Insecurity:     Worried About Running Out of Food in the Last Year: Not on file    [place] of Food in the Last Year: Not on file   Transportation Needs:     Lack of Transportation (Medical): Not on file    Lack of Transportation (Non-Medical):  Not on file   Physical Activity:     Days of Exercise per Week: Not on file    Minutes of Exercise per Session: Not on file   Stress:     Feeling of Stress : Not on file   Social Connections:     Frequency of Communication with Friends and Family: Not on file    Frequency of Social Gatherings with Friends and Family: Not on file    Attends Alevism Services: Not on file    Active Member of Clubs or Organizations: Not on file    Attends Club or Organization Meetings: Not on file    Marital Status: Not on file   Intimate Partner Violence:     Fear of Current or Ex-Partner: Not on file    Emotionally Abused: Not on file    Physically Abused: Not on file    Sexually Abused: Not on file   Housing Stability:     Unable to Pay for Housing in the Last Year: Not on file    Number of [place] in the Last Year: Not on file    Unstable Housing in the Last Year: Not on file       Review of Systems  Review of Systems - Review of all systems is negative except as noted above in the HPI.     Vital Signs  Visit Vitals  /83 (BP 1 Location: Left upper arm, BP Patient Position: Sitting, BP Cuff Size: Adult)   Pulse 83   Temp 98.5 °F (36.9 °C) (Oral)   Resp 22   Ht 5' 3\" (1.6 m)   Wt 209 lb (94.8 kg)   SpO2 95%   BMI 37.02 kg/m²         Physical Exam  Physical Examination: General appearance - alert, well appearing, and in no distress, oriented to person, place, and time, overweight and acyanotic, in no respiratory distress  Mental status - alert, oriented to person, place, and time, affect appropriate to mood  Neck - supple, no significant adenopathy  Lymphatics - no palpable lymphadenopathy, no hepatosplenomegaly  Chest - clear to auscultation, no wheezes, rales or rhonchi, symmetric air entry  Heart - S1 and S2 normal  Abdomen - no rebound tenderness noted  Back exam - limited range of motion  Neurological - motor and sensory grossly normal bilaterally  Musculoskeletal - discomfort to palpation left wrist medical styloid process  Extremities - no pedal edema noted, intact peripheral pulses      Results  Results for orders placed or performed during the hospital encounter of 04/23/21   NOVEL CORONAVIRUS (COVID-19)   Result Value Ref Range    SARS-CoV-2 Not Detected Not Detected     HCG URINE, QL. - POC   Result Value Ref Range    Pregnancy test,urine (POC) Negative NEG         ASSESSMENT and PLAN    ICD-10-CM ICD-9-CM    1. Essential hypertension  I10 401.9    2. Sinus congestion  R09.81 478.19    3. Wrist pain, chronic, left  M25.532 719.43     G89.29 338.29      lab results and schedule of future lab studies reviewed with patient  reviewed diet, exercise and weight control  reviewed medications and side effects in detail  radiology results and schedule of future radiology studies reviewed with patient      I have discussed the diagnosis with the patient and the intended plan of care as seen in the above orders. The patient has received an after-visit summary and questions were answered concerning future plans. I have discussed medication, side effects, and warnings with the patient in detail. The patient verbalized understanding and is in agreement with the plan of care. The patient will contact the office with any additional concerns. Cathi West MD    PLEASE NOTE:   This document has been produced using voice recognition software.  Unrecognized errors in transcription may be present

## 2022-01-25 NOTE — PROGRESS NOTES
Chief Complaint   Patient presents with    Wrist Pain    Nasal Congestion     had symptoms since nov 1. \"Have you been to the ER, urgent care clinic since your last visit? Hospitalized since your last visit? \" No    2. \"Have you seen or consulted any other health care providers outside of the [address] [place] [address] since your last visit? \" No     3. For patients aged 39-70: Has the patient had a colonoscopy / FIT/ Cologuard? Yes - no Care Gap present      If the patient is female:    4. For patients aged 41-77: Has the patient had a mammogram within the past 2 years? No  See top three    5. For patients aged 21-65: Has the patient had a pap smear?  No

## 2022-03-19 PROBLEM — G43.909 MIGRAINE WITHOUT STATUS MIGRAINOSUS, NOT INTRACTABLE: Status: ACTIVE | Noted: 2018-09-18

## 2022-03-20 PROBLEM — F33.9 RECURRENT DEPRESSION (HCC): Status: ACTIVE | Noted: 2018-01-18

## 2022-06-20 DIAGNOSIS — I10 ESSENTIAL HYPERTENSION: ICD-10-CM

## 2022-06-21 DIAGNOSIS — I10 ESSENTIAL HYPERTENSION: ICD-10-CM

## 2022-06-22 DIAGNOSIS — I10 ESSENTIAL HYPERTENSION: ICD-10-CM

## 2022-06-22 RX ORDER — AMLODIPINE BESYLATE 5 MG/1
5 TABLET ORAL DAILY
Qty: 90 TABLET | Refills: 1 | Status: SHIPPED | OUTPATIENT
Start: 2022-06-22

## 2022-06-22 RX ORDER — AMLODIPINE BESYLATE 5 MG/1
TABLET ORAL
Qty: 90 TABLET | Refills: 1 | Status: SHIPPED | OUTPATIENT
Start: 2022-06-22

## 2022-06-22 NOTE — TELEPHONE ENCOUNTER
This patient contacted office for the following prescriptions to be filled:    Last office visit: 9/22/22  Follow up appointment: 7/25/22 (if overdue call patient to schedule appointment)  Medication requested :   Requested Prescriptions     Pending Prescriptions Disp Refills    amLODIPine (NORVASC) 5 mg tablet 90 Tablet 1     Sig: Take 1 Tablet by mouth daily.        PCP: Dr. Soham Franks  Mail order or Local pharmacy name: [address] [place] [address]

## 2022-06-23 DIAGNOSIS — I10 ESSENTIAL HYPERTENSION: ICD-10-CM

## 2022-06-26 RX ORDER — AMLODIPINE BESYLATE 5 MG/1
TABLET ORAL
Qty: 90 TABLET | Refills: 1 | Status: SHIPPED | OUTPATIENT
Start: 2022-06-26

## 2022-08-03 DIAGNOSIS — I10 ESSENTIAL HYPERTENSION: ICD-10-CM

## 2022-08-03 RX ORDER — AMLODIPINE BESYLATE 5 MG/1
5 TABLET ORAL DAILY
Qty: 90 TABLET | Refills: 1 | Status: CANCELLED | OUTPATIENT
Start: 2022-08-03

## 2022-08-03 NOTE — TELEPHONE ENCOUNTER
Spoke to [facility] they said they will fill it. Called Pt. Verified identity She is going to pick it up. She verbalized understanding.

## 2023-02-22 RX ORDER — MELOXICAM 15 MG/1
TABLET ORAL
Qty: 90 TABLET | Refills: 3 | Status: SHIPPED | OUTPATIENT
Start: 2023-02-22

## 2023-02-24 ENCOUNTER — TELEPHONE (OUTPATIENT)
Facility: CLINIC | Age: 58
End: 2023-02-24

## 2023-02-24 NOTE — TELEPHONE ENCOUNTER
Refill Request:  amLODIPine (NORVASC) 5 MG tablet 90 day supply    [address] [place], [place]

## 2023-02-27 ENCOUNTER — TELEPHONE (OUTPATIENT)
Facility: CLINIC | Age: 58
End: 2023-02-27

## 2023-02-27 RX ORDER — AMLODIPINE BESYLATE 5 MG/1
TABLET ORAL
Qty: 30 TABLET | Status: CANCELLED | OUTPATIENT
Start: 2023-02-27

## 2023-02-27 RX ORDER — AMLODIPINE BESYLATE 5 MG/1
TABLET ORAL
Qty: 90 TABLET | Refills: 1 | OUTPATIENT
Start: 2023-02-27

## 2023-02-28 RX ORDER — AMLODIPINE BESYLATE 5 MG/1
TABLET ORAL
Qty: 90 TABLET | OUTPATIENT
Start: 2023-02-28

## 2023-03-13 ENCOUNTER — OFFICE VISIT (OUTPATIENT)
Facility: CLINIC | Age: 58
End: 2023-03-13
Payer: COMMERCIAL

## 2023-03-13 VITALS
OXYGEN SATURATION: 97 % | BODY MASS INDEX: 37.92 KG/M2 | SYSTOLIC BLOOD PRESSURE: 148 MMHG | HEART RATE: 67 BPM | HEIGHT: 63 IN | DIASTOLIC BLOOD PRESSURE: 82 MMHG | TEMPERATURE: 98 F | WEIGHT: 214 LBS | RESPIRATION RATE: 20 BRPM

## 2023-03-13 DIAGNOSIS — I10 ESSENTIAL (PRIMARY) HYPERTENSION: ICD-10-CM

## 2023-03-13 DIAGNOSIS — Z12.11 SCREEN FOR COLON CANCER: ICD-10-CM

## 2023-03-13 DIAGNOSIS — Z12.31 SCREENING MAMMOGRAM FOR BREAST CANCER: ICD-10-CM

## 2023-03-13 DIAGNOSIS — Z11.59 NEED FOR HEPATITIS C SCREENING TEST: ICD-10-CM

## 2023-03-13 DIAGNOSIS — R73.9 HYPERGLYCEMIA: ICD-10-CM

## 2023-03-13 DIAGNOSIS — M21.962 FOOT DEFORMITY, ACQUIRED, LEFT: ICD-10-CM

## 2023-03-13 DIAGNOSIS — Z00.00 GENERAL MEDICAL EXAM: Primary | ICD-10-CM

## 2023-03-13 LAB — HBA1C MFR BLD: 5.6 %

## 2023-03-13 PROCEDURE — 3078F DIAST BP <80 MM HG: CPT | Performed by: FAMILY MEDICINE

## 2023-03-13 PROCEDURE — 99396 PREV VISIT EST AGE 40-64: CPT | Performed by: FAMILY MEDICINE

## 2023-03-13 PROCEDURE — 83036 HEMOGLOBIN GLYCOSYLATED A1C: CPT | Performed by: FAMILY MEDICINE

## 2023-03-13 PROCEDURE — 3074F SYST BP LT 130 MM HG: CPT | Performed by: FAMILY MEDICINE

## 2023-03-13 RX ORDER — AMLODIPINE BESYLATE 5 MG/1
TABLET ORAL
Qty: 90 TABLET | Refills: 1 | Status: SHIPPED | OUTPATIENT
Start: 2023-03-13

## 2023-03-13 SDOH — ECONOMIC STABILITY: FOOD INSECURITY: WITHIN THE PAST 12 MONTHS, THE FOOD YOU BOUGHT JUST DIDN'T LAST AND YOU DIDN'T HAVE MONEY TO GET MORE.: NEVER TRUE

## 2023-03-13 SDOH — ECONOMIC STABILITY: FOOD INSECURITY: WITHIN THE PAST 12 MONTHS, YOU WORRIED THAT YOUR FOOD WOULD RUN OUT BEFORE YOU GOT MONEY TO BUY MORE.: NEVER TRUE

## 2023-03-13 SDOH — ECONOMIC STABILITY: INCOME INSECURITY: HOW HARD IS IT FOR YOU TO PAY FOR THE VERY BASICS LIKE FOOD, HOUSING, MEDICAL CARE, AND HEATING?: NOT HARD AT ALL

## 2023-03-13 SDOH — ECONOMIC STABILITY: HOUSING INSECURITY
IN THE LAST 12 MONTHS, WAS THERE A TIME WHEN YOU DID NOT HAVE A STEADY PLACE TO SLEEP OR SLEPT IN A SHELTER (INCLUDING NOW)?: NO

## 2023-03-13 ASSESSMENT — PATIENT HEALTH QUESTIONNAIRE - PHQ9
SUM OF ALL RESPONSES TO PHQ QUESTIONS 1-9: 0
7. TROUBLE CONCENTRATING ON THINGS, SUCH AS READING THE NEWSPAPER OR WATCHING TELEVISION: 0
9. THOUGHTS THAT YOU WOULD BE BETTER OFF DEAD, OR OF HURTING YOURSELF: 0
SUM OF ALL RESPONSES TO PHQ QUESTIONS 1-9: 0
3. TROUBLE FALLING OR STAYING ASLEEP: 0
1. LITTLE INTEREST OR PLEASURE IN DOING THINGS: 0
2. FEELING DOWN, DEPRESSED OR HOPELESS: 0
6. FEELING BAD ABOUT YOURSELF - OR THAT YOU ARE A FAILURE OR HAVE LET YOURSELF OR YOUR FAMILY DOWN: 0
8. MOVING OR SPEAKING SO SLOWLY THAT OTHER PEOPLE COULD HAVE NOTICED. OR THE OPPOSITE, BEING SO FIGETY OR RESTLESS THAT YOU HAVE BEEN MOVING AROUND A LOT MORE THAN USUAL: 0
4. FEELING TIRED OR HAVING LITTLE ENERGY: 0
SUM OF ALL RESPONSES TO PHQ QUESTIONS 1-9: 0
10. IF YOU CHECKED OFF ANY PROBLEMS, HOW DIFFICULT HAVE THESE PROBLEMS MADE IT FOR YOU TO DO YOUR WORK, TAKE CARE OF THINGS AT HOME, OR GET ALONG WITH OTHER PEOPLE: 0
SUM OF ALL RESPONSES TO PHQ9 QUESTIONS 1 & 2: 0
5. POOR APPETITE OR OVEREATING: 0
SUM OF ALL RESPONSES TO PHQ QUESTIONS 1-9: 0

## 2023-03-13 NOTE — PROGRESS NOTES
1. \"Have you been to the ER, urgent care clinic since your last visit? Hospitalized since your last visit? \"No    2. \"Have you seen or consulted any other health care providers outside of the [address] [place] [address] since your last visit? \" No    3. For patients aged 39-70: Has the patient had a colonoscopy / FIT/ Cologuard? No      If the patient is female:    4. For patients aged 41-77: Has the patient had a mammogram within the past 2 years? No      5. For patients aged 21-65: Has the patient had a pap smear?  Yes

## 2023-03-13 NOTE — PROGRESS NOTES
HPI  Felisha Burgos comes in for complete physical exam and follow-up care. Complete physical exam: Patient comes in for complete physical exam.  This is stable. We will check labs. Foot deformity: Patient has left foot deformity. The muscles flex up when she is stands on the foot and tries to walk. She has had surgery on the left foot. This was done by a podiatrist.  This was in an effort to correct a flexion deformity but this does not help. She would like referral to a different specialist.  Referral to foot and ankle specialist is placed. Hypertension: Patient has hypertension. Has been off medication for some weeks. She is on amlodipine 5 mg daily. Denies headache, changes in vision or focal weakness. I will check labs. She will take a low-sodium diet. I will send in a prescription of amlodipine 5 mg daily. Hyperglycemia: We will check HbA1c. Obesity: Patient has a BMI of 37.91. She will intensify lifestyle and dietary modification. Health maintenance: Patient declines a flu vaccine, tetanus vaccine and pneumonia vaccine. I will refer patient for screening colonoscopy. Patient will also be referred for mammogram to screen for breast cancer. She has had a Pap smear done. We will request the records.       Past Medical History  Past Medical History:   Diagnosis Date    Arthritis     Coagulation disorder ([address]     Current tear knee, medial meniscus     Right knee    Depression     H/O seasonal allergies     Hiatal hernia     causes heartburn occasionally    HSV infection     HTN (hypertension)        Surgical History  Past Surgical History:   Procedure Laterality Date    ANKLE FRACTURE SURGERY      COLONOSCOPY N/A 2/27/2019    COLONOSCOPY performed by Cristiano Mims MD at [address] [facility] ARTHROSCOPY Left     KNEE ARTHROSCOPY Right     KNEE SCOPE, ALLOGRAFT IMPANT      ORTHOPEDIC SURGERY Left     ankle    TUBAL LIGATION          Medications  Current Outpatient Medications   Medication Sig Dispense Refill    vitamin D3 (CHOLECALCIFEROL) 125 MCG (5000 UT) TABS tablet Take 1 capsule by mouth daily      amLODIPine (NORVASC) 5 MG tablet TAKE 1 TABLET BY MOUTH DAILY 90 tablet 1    meloxicam (MOBIC) 15 MG tablet TAKE ONE TABLET BY MOUTH DAILY 90 tablet 3    acetaminophen (TYLENOL) 500 MG tablet Take 500 mg by mouth every 6 hours as needed      fluticasone (FLONASE) 50 MCG/ACT nasal spray 2 sprays by Nasal route daily      valACYclovir (VALTREX) 500 MG tablet TK 1 T PO QD      BIOTIN PO Take by mouth (Patient not taking: Reported on 3/13/2023)      cyanocobalamin 1000 MCG tablet Take 1,000 mcg by mouth daily (Patient not taking: Reported on 3/13/2023)       No current facility-administered medications for this visit.        Allergies  Allergies   Allergen Reactions    Amoxicillin-Pot Clavulanate Nausea And Vomiting       Family History  Family History   Problem Relation Age of Onset    Cancer Mother     Colon Cancer Brother     Colon Cancer Mother     Cancer Father        Social History  Social History     Socioeconomic History    Marital status:      Spouse name: Not on file    Number of children: Not on file    Years of education: Not on file    Highest education level: Not on file   Occupational History    Not on file   Tobacco Use    Smoking status: Never    Smokeless tobacco: Never   Substance and Sexual Activity    Alcohol use: No    Drug use: Never    Sexual activity: Not on file   Other Topics Concern    Not on file   Social History Narrative    Not on file     Social Determinants of Health     Financial Resource Strain: Not on file   Food Insecurity: Not on file   Transportation Needs: Not on file   Physical Activity: Not on file   Stress: Not on file   Social Connections: Not on file   Intimate Partner Violence: Not on file   Housing Stability: Not on file       Review of Systems  Review of Systems - Review of all systems is negative except as noted above in the HPI. Vital Signs  BP (!) 151/87   Pulse 67   Temp 98 °F (36.7 °C) (Temporal)   Resp 20   Ht 5' 3\" (1.6 m)   Wt 214 lb (97.1 kg)   SpO2 97%   BMI 37.91 kg/m²       Physical Exam  Physical Examination: General appearance - alert, well appearing, and in no distress, oriented to person, place, and time, overweight, and acyanotic, in no respiratory distress  Mental status - alert, oriented to person, place, and time  Neck - supple, no significant adenopathy  Lymphatics - no palpable lymphadenopathy, no hepatosplenomegaly  Chest - clear to auscultation, no wheezes, rales or rhonchi, symmetric air entry  Heart - normal rate, regular rhythm, normal S1, S2, no murmurs, rubs, clicks or gallops  Abdomen - soft, nontender, nondistended, no masses or organomegaly  Back exam - limited range of motion  Neurological - neck supple without rigidity  Musculoskeletal -left foot has muscle spasm and flexion of the toes on weightbearing  Extremities - no pedal edema noted, intact peripheral pulses      Results  No results found for this visit on 03/13/23. ASSESSMENT and PLAN    ICD-10-CM    1. General medical exam  Z00.00       2. Essential (primary) hypertension  I10 amLODIPine (NORVASC) 5 MG tablet     Lipid Panel     CBC with Auto Differential     Comprehensive Metabolic Panel      3. Foot deformity, acquired, left  M21.962 [id number] [facility], Orthopedic Surgery(General/Foot & Ankle), [place] ([facility])      4. Screen for colon cancer  Z12.11 [address] Valerio Golden MD, Colon & Rectal Surgery, [place]      5. Screening mammogram for breast cancer  Z12.31 MAURO DIGITAL SCREEN W OR WO CAD BILATERAL      6. Need for hepatitis C screening test  Z11.59 Hepatitis C Antibody      7.  Hyperglycemia  R73.9 AMB POC HEMOGLOBIN A1C      lab results and schedule of future lab studies reviewed with patient  reviewed diet, exercise and weight control  cardiovascular risk and specific lipid/LDL goals reviewed  reviewed medications and side effects in detail  radiology results and schedule of future radiology studies reviewed with patient      I have discussed the diagnosis with the patient and the intended plan of care as seen in the above orders. The patient has received an after-visit summary and questions were answered concerning future plans. I have discussed medication, side effects, and warnings with the patient in detail. The patient verbalized understanding and is in agreement with the plan of care. The patient will contact the office with any additional concerns. Jatinder Magallanes MD    PLEASE NOTE:   This document has been produced using voice recognition software.  Unrecognized errors in transcription may be present

## 2023-03-18 LAB
A/G RATIO: 1.2 RATIO (ref 1.1–2.6)
ALBUMIN SERPL-MCNC: 4 G/DL (ref 3.5–5)
ALP BLD-CCNC: 94 U/L (ref 25–115)
ALT SERPL-CCNC: 13 U/L (ref 5–40)
ANION GAP SERPL CALCULATED.3IONS-SCNC: 9 MMOL/L (ref 3–15)
AST SERPL-CCNC: 16 U/L (ref 10–37)
BASOPHILS # BLD: 1 % (ref 0–2)
BASOPHILS ABSOLUTE: 0.1 K/UL (ref 0–0.2)
BILIRUB SERPL-MCNC: 0.4 MG/DL (ref 0.2–1.2)
BUN BLDV-MCNC: 12 MG/DL (ref 6–22)
CALCIUM SERPL-MCNC: 9.6 MG/DL (ref 8.4–10.5)
CHLORIDE BLD-SCNC: 104 MMOL/L (ref 98–110)
CHOLESTEROL/HDL RATIO: 4.8 (ref 0–5)
CHOLESTEROL: 238 MG/DL (ref 110–200)
CO2: 29 MMOL/L (ref 20–32)
CREAT SERPL-MCNC: 0.9 MG/DL (ref 0.5–1.2)
EOSINOPHIL # BLD: 3 % (ref 0–6)
EOSINOPHILS ABSOLUTE: 0.3 K/UL (ref 0–0.5)
GLOBULIN: 3.4 G/DL (ref 2–4)
GLOMERULAR FILTRATION RATE: >60 ML/MIN/1.73 SQ.M.
GLUCOSE: 85 MG/DL (ref 70–99)
HCT VFR BLD CALC: 44.8 % (ref 35.1–48)
HDLC SERPL-MCNC: 50 MG/DL
HEMOGLOBIN: 14.7 G/DL (ref 11.7–16)
HEPATITIS C ANTIBODY: NORMAL
LDL CHOLESTEROL CALCULATED: 169 MG/DL (ref 50–99)
LDL/HDL RATIO: 3.4
LYMPHOCYTES # BLD: 47 % (ref 20–45)
LYMPHOCYTES ABSOLUTE: 3.9 K/UL (ref 1–4.8)
MCH RBC QN AUTO: 34 PG (ref 26–34)
MCHC RBC AUTO-ENTMCNC: 33 G/DL (ref 31–36)
MCV RBC AUTO: 102 FL (ref 80–99)
MONOCYTES ABSOLUTE: 0.6 K/UL (ref 0.1–1)
MONOCYTES: 8 % (ref 3–12)
NEUTROPHILS ABSOLUTE: 3.3 K/UL (ref 1.8–7.7)
NEUTROPHILS: 41 % (ref 40–75)
NON-HDL CHOLESTEROL: 188 MG/DL
PDW BLD-RTO: 12.7 % (ref 10–15.5)
PLATELET # BLD: 305 K/UL (ref 140–440)
PMV BLD AUTO: 10.1 FL (ref 9–13)
POTASSIUM SERPL-SCNC: 4.2 MMOL/L (ref 3.5–5.5)
RBC: 4.39 M/UL (ref 3.8–5.2)
SODIUM BLD-SCNC: 142 MMOL/L (ref 133–145)
TOTAL PROTEIN: 7.4 G/DL (ref 6.4–8.3)
TRIGL SERPL-MCNC: 93 MG/DL (ref 40–149)
VLDLC SERPL CALC-MCNC: 19 MG/DL (ref 8–30)
WBC: 8.1 K/UL (ref 4–11)

## 2023-03-27 RX ORDER — ATORVASTATIN CALCIUM 20 MG/1
20 TABLET, FILM COATED ORAL DAILY
Qty: 30 TABLET | Refills: 3 | Status: SHIPPED | OUTPATIENT
Start: 2023-03-27

## 2023-05-03 ENCOUNTER — OFFICE VISIT (OUTPATIENT)
Age: 58
End: 2023-05-03

## 2023-05-03 VITALS — HEIGHT: 63 IN | WEIGHT: 212 LBS | BODY MASS INDEX: 37.56 KG/M2

## 2023-05-03 DIAGNOSIS — G89.29 CHRONIC PAIN OF LEFT ANKLE: ICD-10-CM

## 2023-05-03 DIAGNOSIS — M25.572 CHRONIC PAIN OF LEFT ANKLE: ICD-10-CM

## 2023-05-03 DIAGNOSIS — R20.0 NUMBNESS AND TINGLING OF LEFT LOWER EXTREMITY: ICD-10-CM

## 2023-05-03 DIAGNOSIS — M21.962 ACQUIRED DEFORMITY OF LEFT FOOT: Primary | ICD-10-CM

## 2023-05-03 DIAGNOSIS — R20.2 NUMBNESS AND TINGLING OF LEFT LOWER EXTREMITY: ICD-10-CM

## 2023-05-03 NOTE — PATIENT INSTRUCTIONS
[employer] or [employer] style shoe.  Prosthetics and Orthotics  Address: [address], [place], [address]   Phone: [phone]  DME: laterally posted orthotic left    EMG/NCV left lower extremity to evaluate for neuropathy      Please follow up with your PCP for any health maintenance as recommended.

## 2023-05-03 NOTE — PROGRESS NOTES
AMBULATORY PROGRESS NOTE      Patient: Faye Croft             MRN: [id number]     SSN: [id number] Body mass index is 37.55 kg/m². YOB: 1965     AGE: 56 y.o. EX: female    PCP: Ismael Arriaga MD       IMPRESSION //  DIAGNOSIS AND TREATMENT PLAN      Faye Croft has a diagnosis of: In assessing her, and looking at her, when she stands she tends to hold her forefoot up a little bit so her first metatarsal does not always want to go all the way down to the floor but she still remains flexible at this first TMT region. I think her hindfoot is good her ankle is good in terms of overall alignment. So in looking at her ankle she no longer has Achilles tendon or gastroc contracture. Looking at the ankle her hindfoot there is no significant abduction at the talonavicular region or subtalar region. In looking at the midfoot and forefoot, when she stands she tends to rotate her left hip inward a little bit, and again has some slight elevation of the first metatarsal.  There is no abnormal wear to the plantar portion of her foot to suggest chronic lateral column overload. Next after having lengthy discussion with her, I like to see we can still rebalance her foot, I think she would benefit from a laterally posted orthotic to take advantage of the flexibility of her of her forefoot first metatarsal region. Next I do not think she requires a opening first metatarsal wedge plantarflexion osteotomy, at this point, and I do not think she is excessively tight and at the talonavicular region when she had her FDL tendon transfer. Certainly she has some limitation in that she does not have full excessive eversion, so when I compare her left her right in terms of everting her, on her right nonoperative side, she can zeinab from the neutral position to about 20 degrees of eversion at the talonavicular and calcaneocuboid and subtalar joints.   On this left operative side, I can

## 2023-06-07 DIAGNOSIS — I10 ESSENTIAL (PRIMARY) HYPERTENSION: ICD-10-CM

## 2023-06-07 DIAGNOSIS — Z11.59 NEED FOR HEPATITIS C SCREENING TEST: ICD-10-CM

## 2023-06-13 PROBLEM — F33.9 RECURRENT DEPRESSION (HCC): Status: RESOLVED | Noted: 2018-01-18 | Resolved: 2023-06-13

## 2023-06-15 PROBLEM — E66.01 SEVERE OBESITY (BMI 35.0-39.9) WITH COMORBIDITY (HCC): Status: ACTIVE | Noted: 2023-06-15

## 2023-06-19 ENCOUNTER — OFFICE VISIT (OUTPATIENT)
Age: 58
End: 2023-06-19
Payer: COMMERCIAL

## 2023-06-19 DIAGNOSIS — M67.00 CONTRACTURE OF ACHILLES TENDON, UNSPECIFIED LATERALITY: Primary | ICD-10-CM

## 2023-06-19 PROCEDURE — 99213 OFFICE O/P EST LOW 20 MIN: CPT | Performed by: ORTHOPAEDIC SURGERY

## 2023-06-19 NOTE — PROGRESS NOTES
AMBULATORY PROGRESS NOTE      Patient: Keyshawn Alston             MRN: [id number]     SSN: [id number] There is no height or weight on file to calculate BMI. YOB: 1965     AGE: 56 y.o. EX: female    PCP: Ismael Rondon MD       IMPRESSION //  DIAGNOSIS AND TREATMENT PLAN      Keyshawn Alston has a diagnosis of:      DIAGNOSES    1. Contracture of Achilles tendon, unspecified laterality          PLAN:    1. Discussed possible laterally posted orthotic shoe insert  2. Recommended wearing New [employer] or [employer] sneakers   3. Referral to massage therapy    RTO PRN    Orders Placed This Encounter    [facility] [address] [facility]     Referral Priority:   Routine     Referral Type:   Eval and Treat     Referral Reason:   Specialty Services Required     Requested Specialty:   Physical Therapist     Number of Visits Requested:   1    DME Order for (Specify) as OP     - CUSTOM DME device ordered -  Left laterally POSTED TRILAMINAR ORTHOTIC  - Diagnosis: Contracture of Achilles tendon, unspecified laterality  (primary encounter diagnosis)  Plan: [facility] [address] of Need: [facility]    This item, is of medical necessity, in order to support the medial column medial hindfoot in this individual, who has progressive collapsing foot deformity/posterior tibial tendinitis/posterior tibial dysfunction. This will raise her arch, improve her mechanics, and balance her posterior tibial tendon and peroneal brevis tendons as these are antagonistic tendons. Donnell Dejesus MD  6/19/2023 1:46 PM        There are no Patient Instructions on file for this visit. Please follow up with your PCP for any health maintenance as recommended. Keyshawn Alston  expresses understanding of the diagnosis, treatment plan, and all of their proposed questions were answered to their satisfaction.  Patient

## 2023-07-18 RX ORDER — ATORVASTATIN CALCIUM 20 MG/1
20 TABLET, FILM COATED ORAL DAILY
Qty: 30 TABLET | Refills: 3 | Status: SHIPPED | OUTPATIENT
Start: 2023-07-18

## 2023-09-07 DIAGNOSIS — I10 ESSENTIAL (PRIMARY) HYPERTENSION: ICD-10-CM

## 2023-09-11 RX ORDER — AMLODIPINE BESYLATE 5 MG/1
TABLET ORAL
Qty: 90 TABLET | Refills: 1 | Status: SHIPPED | OUTPATIENT
Start: 2023-09-11 | End: 2023-09-13 | Stop reason: SDUPTHER

## 2023-09-13 ENCOUNTER — HOSPITAL ENCOUNTER (OUTPATIENT)
Facility: HOSPITAL | Age: 58
Setting detail: SPECIMEN
Discharge: HOME | End: 2023-09-16
Payer: COMMERCIAL

## 2023-09-13 ENCOUNTER — OFFICE VISIT (OUTPATIENT)
Facility: CLINIC | Age: 58
End: 2023-09-13
Payer: COMMERCIAL

## 2023-09-13 VITALS
WEIGHT: 208 LBS | TEMPERATURE: 97.8 F | DIASTOLIC BLOOD PRESSURE: 84 MMHG | HEART RATE: 63 BPM | RESPIRATION RATE: 20 BRPM | BODY MASS INDEX: 36.86 KG/M2 | OXYGEN SATURATION: 99 % | HEIGHT: 63 IN | SYSTOLIC BLOOD PRESSURE: 138 MMHG

## 2023-09-13 DIAGNOSIS — I10 ESSENTIAL (PRIMARY) HYPERTENSION: Primary | ICD-10-CM

## 2023-09-13 DIAGNOSIS — E78.5 HYPERLIPIDEMIA, UNSPECIFIED HYPERLIPIDEMIA TYPE: ICD-10-CM

## 2023-09-13 DIAGNOSIS — R31.9 HEMATURIA, UNSPECIFIED TYPE: ICD-10-CM

## 2023-09-13 DIAGNOSIS — M54.50 MIDLINE LOW BACK PAIN, UNSPECIFIED CHRONICITY, UNSPECIFIED WHETHER SCIATICA PRESENT: ICD-10-CM

## 2023-09-13 DIAGNOSIS — E66.9 OBESITY (BMI 30-39.9): ICD-10-CM

## 2023-09-13 LAB
APPEARANCE UR: CLEAR
BACTERIA URNS QL MICRO: ABNORMAL /HPF
BILIRUB UR QL: NEGATIVE
BILIRUBIN, URINE, POC: NEGATIVE
BLOOD URINE, POC: NORMAL
COLOR UR: YELLOW
EPITH CASTS URNS QL MICRO: ABNORMAL /LPF (ref 0–5)
GLUCOSE UR STRIP.AUTO-MCNC: NEGATIVE MG/DL
GLUCOSE URINE, POC: NEGATIVE
HGB UR QL STRIP: NEGATIVE
KETONES UR QL STRIP.AUTO: NEGATIVE MG/DL
KETONES, URINE, POC: NEGATIVE
LEUKOCYTE ESTERASE UR QL STRIP.AUTO: ABNORMAL
LEUKOCYTE ESTERASE, URINE, POC: NEGATIVE
NITRITE UR QL STRIP.AUTO: NEGATIVE
NITRITE, URINE, POC: NEGATIVE
PH UR STRIP: 5.5 (ref 5–8)
PH, URINE, POC: 5.5 (ref 4.6–8)
PROT UR STRIP-MCNC: NEGATIVE MG/DL
PROTEIN,URINE, POC: NEGATIVE
RBC #/AREA URNS HPF: ABNORMAL /HPF (ref 0–5)
SP GR UR REFRACTOMETRY: 1.02 (ref 1–1.03)
SPECIFIC GRAVITY, URINE, POC: 1.02 (ref 1–1.03)
URINALYSIS CLARITY, POC: CLEAR
URINALYSIS COLOR, POC: YELLOW
UROBILINOGEN UR QL STRIP.AUTO: 0.2 EU/DL (ref 0.2–1)
UROBILINOGEN, POC: NORMAL
WBC URNS QL MICRO: ABNORMAL /HPF (ref 0–4)

## 2023-09-13 PROCEDURE — 81001 URINALYSIS AUTO W/SCOPE: CPT

## 2023-09-13 PROCEDURE — 3079F DIAST BP 80-89 MM HG: CPT | Performed by: FAMILY MEDICINE

## 2023-09-13 PROCEDURE — 99213 OFFICE O/P EST LOW 20 MIN: CPT | Performed by: FAMILY MEDICINE

## 2023-09-13 PROCEDURE — 3075F SYST BP GE 130 - 139MM HG: CPT | Performed by: FAMILY MEDICINE

## 2023-09-13 PROCEDURE — 81003 URINALYSIS AUTO W/O SCOPE: CPT | Performed by: FAMILY MEDICINE

## 2023-09-13 RX ORDER — BACLOFEN 10 MG/1
10 TABLET ORAL 3 TIMES DAILY PRN
Qty: 30 TABLET | Refills: 1 | Status: SHIPPED | OUTPATIENT
Start: 2023-09-13

## 2023-09-13 RX ORDER — ATORVASTATIN CALCIUM 20 MG/1
20 TABLET, FILM COATED ORAL DAILY
Qty: 90 TABLET | Refills: 1 | Status: SHIPPED | OUTPATIENT
Start: 2023-09-13

## 2023-09-13 RX ORDER — AMLODIPINE BESYLATE 5 MG/1
TABLET ORAL
Qty: 90 TABLET | Refills: 1 | Status: SHIPPED | OUTPATIENT
Start: 2023-09-13

## 2023-09-13 SDOH — ECONOMIC STABILITY: INCOME INSECURITY: HOW HARD IS IT FOR YOU TO PAY FOR THE VERY BASICS LIKE FOOD, HOUSING, MEDICAL CARE, AND HEATING?: NOT VERY HARD

## 2023-09-13 SDOH — ECONOMIC STABILITY: FOOD INSECURITY: WITHIN THE PAST 12 MONTHS, YOU WORRIED THAT YOUR FOOD WOULD RUN OUT BEFORE YOU GOT MONEY TO BUY MORE.: NEVER TRUE

## 2023-09-13 SDOH — ECONOMIC STABILITY: FOOD INSECURITY: WITHIN THE PAST 12 MONTHS, THE FOOD YOU BOUGHT JUST DIDN'T LAST AND YOU DIDN'T HAVE MONEY TO GET MORE.: NEVER TRUE

## 2023-09-13 ASSESSMENT — PATIENT HEALTH QUESTIONNAIRE - PHQ9
SUM OF ALL RESPONSES TO PHQ QUESTIONS 1-9: 2
SUM OF ALL RESPONSES TO PHQ QUESTIONS 1-9: 2
SUM OF ALL RESPONSES TO PHQ9 QUESTIONS 1 & 2: 2
1. LITTLE INTEREST OR PLEASURE IN DOING THINGS: 1
2. FEELING DOWN, DEPRESSED OR HOPELESS: 1
SUM OF ALL RESPONSES TO PHQ QUESTIONS 1-9: 2
SUM OF ALL RESPONSES TO PHQ QUESTIONS 1-9: 2

## 2023-09-13 NOTE — PROGRESS NOTES
1. \"Have you been to the ER, urgent care clinic since your last visit? Hospitalized since your last visit? \"No    2. \"Have you seen or consulted any other health care providers outside of the [address] [place] [address] since your last visit? \" No    3. For patients aged 43-73: Has the patient had a colonoscopy / FIT/ Cologuard? No      If the patient is female:    4. For patients aged 43-66: Has the patient had a mammogram within the past 2 years? Yes      5. For patients aged 21-65: Has the patient had a pap smear?  Yes
peripheral pulses        Results  No results found for this visit on 09/13/23. ASSESSMENT and PLAN    ICD-10-CM    1. Essential (primary) hypertension  I10 amLODIPine (NORVASC) 5 MG tablet      2. Midline low back pain, unspecified chronicity, unspecified whether sciatica present  M54.50 AMB POC URINALYSIS DIP STICK AUTO W/O MICRO     XR LUMBAR SPINE (2-3 VIEWS)     baclofen (LIORESAL) 10 MG tablet      3. Hyperlipidemia, unspecified hyperlipidemia type  E78.5 Lipid Panel     atorvastatin (LIPITOR) 20 MG tablet      4. Hematuria, unspecified type  R31.9 Urinalysis with Microscopic     CT ABDOMEN PELVIS WO CONTRAST Additional Contrast? None      5. Obesity (BMI 30-39. 9)  E66.9       lab results and schedule of future lab studies reviewed with patient  reviewed diet, exercise and weight control  cardiovascular risk and specific lipid/LDL goals reviewed  reviewed medications and side effects in detail  radiology results and schedule of future radiology studies reviewed with patient      I have discussed the diagnosis with the patient and the intended plan of care as seen in the above orders. The patient has received an after-visit summary and questions were answered concerning future plans. I have discussed medication, side effects, and warnings with the patient in detail. The patient verbalized understanding and is in agreement with the plan of care. The patient will contact the office with any additional concerns. Laura Escalante MD    PLEASE NOTE:   This document has been produced using voice recognition software.  Unrecognized errors in transcription may be present

## 2023-09-18 RX ORDER — CIPROFLOXACIN 500 MG/1
500 TABLET, FILM COATED ORAL 2 TIMES DAILY
Qty: 10 TABLET | Refills: 0 | Status: SHIPPED | OUTPATIENT
Start: 2023-09-18 | End: 2023-09-23

## 2023-10-09 ENCOUNTER — HOSPITAL ENCOUNTER (OUTPATIENT)
Facility: HOSPITAL | Age: 58
Discharge: HOME | End: 2023-10-12
Payer: MEDICARE

## 2023-10-09 DIAGNOSIS — R31.9 HEMATURIA, UNSPECIFIED TYPE: ICD-10-CM

## 2023-10-09 PROCEDURE — 74176 CT ABD & PELVIS W/O CONTRAST: CPT

## 2023-10-10 ENCOUNTER — NURSE ONLY (OUTPATIENT)
Age: 58
End: 2023-10-10

## 2023-10-10 VITALS
RESPIRATION RATE: 16 BRPM | WEIGHT: 209 LBS | TEMPERATURE: 98.1 F | BODY MASS INDEX: 37.03 KG/M2 | OXYGEN SATURATION: 99 % | HEIGHT: 63 IN | HEART RATE: 70 BPM

## 2023-10-10 DIAGNOSIS — Z80.0 FAMILY HISTORY OF COLON CANCER: ICD-10-CM

## 2023-10-10 DIAGNOSIS — I10 ESSENTIAL HYPERTENSION: Primary | ICD-10-CM

## 2023-10-10 DIAGNOSIS — Z12.11 COLON CANCER SCREENING: ICD-10-CM

## 2023-10-10 NOTE — PROGRESS NOTES
Colon Screen    Patient: Deanen Dunn MRN: [id number]  SSN: [id number]    YOB: 1965  Age: 66 y.o. Sex: female        Subjective:   Deanne Dunn is here to schedule her recall colonoscopy. Her PCP is Bernie Mesa MD.  Patient referred for colonoscopy for   Family history of coloretal cancer (screening only). Patient denies rectal pain or bleeding. Abdominal surgeries as described below, specifically none. Family history as described below, specifically mother and brother with history of colon cancer. Last colonoscopy was 4 years ago with Dr. Alexsandra Alegria. Allergies   Allergen Reactions    Amoxicillin-Pot Clavulanate Nausea And Vomiting       Past Medical History:   Diagnosis Date    Arthritis     Coagulation disorder ([address])     Current tear knee, medial meniscus     Right knee    Depression     H/O seasonal allergies     Hiatal hernia     causes heartburn occasionally    HSV infection     HTN (hypertension)      Past Surgical History:   Procedure Laterality Date    ANKLE FRACTURE SURGERY Left 2018    COLONOSCOPY N/A 2/27/2019    COLONOSCOPY performed by Venkat Gottlieb MD at [facility] ENDOSCOPY    ENDOMETRIAL ABLATION      KNEE ARTHROSCOPY Left     KNEE ARTHROSCOPY Right     KNEE SCOPE, ALLOGRAFT IMPANT      ORTHOPEDIC SURGERY Left     ankle    TUBAL LIGATION        Family History   Problem Relation Age of Onset    Cancer Mother     Colon Cancer Brother     Colon Cancer Mother     Cancer Father      Social History     Tobacco Use    Smoking status: Never    Smokeless tobacco: Never   Substance Use Topics    Alcohol use: No      Prior to Admission medications    Medication Sig Start Date End Date Taking?  Authorizing Provider   baclofen (LIORESAL) 10 MG tablet Take 1 tablet by mouth 3 times daily as needed (muscle spasm) 9/13/23  Yes Ismael Jason MD   amLODIPine (NORVASC) 5 MG tablet TAKE ONE TABLET BY MOUTH DAILY 9/13/23  Yes Ismael Curry MD   atorvastatin (LIPITOR) 20 MG tablet

## 2023-12-12 ENCOUNTER — OFFICE VISIT (OUTPATIENT)
Facility: CLINIC | Age: 58
End: 2023-12-12
Payer: MEDICARE

## 2023-12-12 VITALS
TEMPERATURE: 97.8 F | SYSTOLIC BLOOD PRESSURE: 131 MMHG | DIASTOLIC BLOOD PRESSURE: 76 MMHG | WEIGHT: 211 LBS | BODY MASS INDEX: 37.39 KG/M2 | HEIGHT: 63 IN | HEART RATE: 71 BPM | OXYGEN SATURATION: 97 % | RESPIRATION RATE: 20 BRPM

## 2023-12-12 DIAGNOSIS — I10 ESSENTIAL (PRIMARY) HYPERTENSION: ICD-10-CM

## 2023-12-12 DIAGNOSIS — Z00.00 MEDICARE ANNUAL WELLNESS VISIT, SUBSEQUENT: Primary | ICD-10-CM

## 2023-12-12 DIAGNOSIS — J30.9 ALLERGIC RHINITIS, UNSPECIFIED SEASONALITY, UNSPECIFIED TRIGGER: ICD-10-CM

## 2023-12-12 DIAGNOSIS — G43.909 MIGRAINE WITHOUT STATUS MIGRAINOSUS, NOT INTRACTABLE, UNSPECIFIED MIGRAINE TYPE: ICD-10-CM

## 2023-12-12 DIAGNOSIS — M19.90 ARTHRITIS: ICD-10-CM

## 2023-12-12 DIAGNOSIS — E78.5 HYPERLIPIDEMIA, UNSPECIFIED HYPERLIPIDEMIA TYPE: ICD-10-CM

## 2023-12-12 DIAGNOSIS — E66.9 OBESITY (BMI 30-39.9): ICD-10-CM

## 2023-12-12 DIAGNOSIS — R73.9 HYPERGLYCEMIA: ICD-10-CM

## 2023-12-12 DIAGNOSIS — Z71.89 ACP (ADVANCE CARE PLANNING): ICD-10-CM

## 2023-12-12 LAB
A/G RATIO: 1.3 RATIO (ref 1.1–2.6)
ALBUMIN SERPL-MCNC: 4.3 G/DL (ref 3.5–5)
ALP BLD-CCNC: 111 U/L (ref 25–115)
ALT SERPL-CCNC: 18 U/L (ref 5–40)
ANION GAP SERPL CALCULATED.3IONS-SCNC: 11 MMOL/L (ref 3–15)
AST SERPL-CCNC: 17 U/L (ref 10–37)
AVERAGE GLUCOSE: 117 MG/DL (ref 91–123)
BASOPHILS # BLD: 1 % (ref 0–2)
BASOPHILS ABSOLUTE: 0.1 K/UL (ref 0–0.2)
BILIRUB SERPL-MCNC: 0.5 MG/DL (ref 0.2–1.2)
BUN BLDV-MCNC: 11 MG/DL (ref 6–22)
CALCIUM SERPL-MCNC: 9.7 MG/DL (ref 8.4–10.5)
CHLORIDE BLD-SCNC: 102 MMOL/L (ref 98–110)
CHOLESTEROL/HDL RATIO: 2.9 (ref 0–5)
CHOLESTEROL: 154 MG/DL (ref 110–200)
CO2: 28 MMOL/L (ref 20–32)
CREAT SERPL-MCNC: 0.9 MG/DL (ref 0.5–1.2)
EOSINOPHIL # BLD: 4 % (ref 0–6)
EOSINOPHILS ABSOLUTE: 0.3 K/UL (ref 0–0.5)
GLOBULIN: 3.4 G/DL (ref 2–4)
GLOMERULAR FILTRATION RATE: >60 ML/MIN/1.73 SQ.M.
GLUCOSE: 115 MG/DL (ref 70–99)
HBA1C MFR BLD: 5.7 % (ref 4.8–5.6)
HCT VFR BLD CALC: 44.2 % (ref 35.1–48)
HDLC SERPL-MCNC: 54 MG/DL
HEMOGLOBIN: 14.3 G/DL (ref 11.7–16)
LDL CHOLESTEROL CALCULATED: 88 MG/DL (ref 50–99)
LDL/HDL RATIO: 1.6
LYMPHOCYTES # BLD: 41 % (ref 20–45)
LYMPHOCYTES ABSOLUTE: 3.6 K/UL (ref 1–4.8)
MCH RBC QN AUTO: 33 PG (ref 26–34)
MCHC RBC AUTO-ENTMCNC: 32 G/DL (ref 31–36)
MCV RBC AUTO: 102 FL (ref 80–99)
MONOCYTES ABSOLUTE: 0.5 K/UL (ref 0.1–1)
MONOCYTES: 6 % (ref 3–12)
NEUTROPHILS ABSOLUTE: 4.4 K/UL (ref 1.8–7.7)
NEUTROPHILS: 49 % (ref 40–75)
NON-HDL CHOLESTEROL: 100 MG/DL
PDW BLD-RTO: 13.1 % (ref 10–15.5)
PLATELET # BLD: 300 K/UL (ref 140–440)
PMV BLD AUTO: 9.8 FL (ref 9–13)
POTASSIUM SERPL-SCNC: 3.7 MMOL/L (ref 3.5–5.5)
RBC: 4.35 M/UL (ref 3.8–5.2)
SODIUM BLD-SCNC: 141 MMOL/L (ref 133–145)
TOTAL PROTEIN: 7.7 G/DL (ref 6.4–8.3)
TRIGL SERPL-MCNC: 60 MG/DL (ref 40–149)
VLDLC SERPL CALC-MCNC: 12 MG/DL (ref 8–30)
WBC: 8.9 K/UL (ref 4–11)

## 2023-12-12 PROCEDURE — 3075F SYST BP GE 130 - 139MM HG: CPT | Performed by: FAMILY MEDICINE

## 2023-12-12 PROCEDURE — 99497 ADVNCD CARE PLAN 30 MIN: CPT | Performed by: FAMILY MEDICINE

## 2023-12-12 PROCEDURE — 3078F DIAST BP <80 MM HG: CPT | Performed by: FAMILY MEDICINE

## 2023-12-12 PROCEDURE — 99213 OFFICE O/P EST LOW 20 MIN: CPT | Performed by: FAMILY MEDICINE

## 2023-12-12 PROCEDURE — 3017F COLORECTAL CA SCREEN DOC REV: CPT | Performed by: FAMILY MEDICINE

## 2023-12-12 PROCEDURE — G8417 CALC BMI ABV UP PARAM F/U: HCPCS | Performed by: FAMILY MEDICINE

## 2023-12-12 PROCEDURE — G0439 PPPS, SUBSEQ VISIT: HCPCS | Performed by: FAMILY MEDICINE

## 2023-12-12 PROCEDURE — 1036F TOBACCO NON-USER: CPT | Performed by: FAMILY MEDICINE

## 2023-12-12 PROCEDURE — G8427 DOCREV CUR MEDS BY ELIG CLIN: HCPCS | Performed by: FAMILY MEDICINE

## 2023-12-12 PROCEDURE — G8484 FLU IMMUNIZE NO ADMIN: HCPCS | Performed by: FAMILY MEDICINE

## 2023-12-12 RX ORDER — FEXOFENADINE HCL 180 MG/1
180 TABLET ORAL DAILY
Qty: 30 TABLET | Refills: 0 | Status: SHIPPED | OUTPATIENT
Start: 2023-12-12 | End: 2024-01-11

## 2023-12-12 RX ORDER — MONTELUKAST SODIUM 10 MG/1
10 TABLET ORAL DAILY
Qty: 30 TABLET | Refills: 3 | Status: SHIPPED | OUTPATIENT
Start: 2023-12-12

## 2023-12-12 ASSESSMENT — PATIENT HEALTH QUESTIONNAIRE - PHQ9
SUM OF ALL RESPONSES TO PHQ QUESTIONS 1-9: 8
SUM OF ALL RESPONSES TO PHQ QUESTIONS 1-9: 8
1. LITTLE INTEREST OR PLEASURE IN DOING THINGS: 3
9. THOUGHTS THAT YOU WOULD BE BETTER OFF DEAD, OR OF HURTING YOURSELF: 0
SUM OF ALL RESPONSES TO PHQ QUESTIONS 1-9: 8
6. FEELING BAD ABOUT YOURSELF - OR THAT YOU ARE A FAILURE OR HAVE LET YOURSELF OR YOUR FAMILY DOWN: 0
8. MOVING OR SPEAKING SO SLOWLY THAT OTHER PEOPLE COULD HAVE NOTICED. OR THE OPPOSITE, BEING SO FIGETY OR RESTLESS THAT YOU HAVE BEEN MOVING AROUND A LOT MORE THAN USUAL: 0
2. FEELING DOWN, DEPRESSED OR HOPELESS: 2
4. FEELING TIRED OR HAVING LITTLE ENERGY: 0
7. TROUBLE CONCENTRATING ON THINGS, SUCH AS READING THE NEWSPAPER OR WATCHING TELEVISION: 1
5. POOR APPETITE OR OVEREATING: 0
SUM OF ALL RESPONSES TO PHQ QUESTIONS 1-9: 8
SUM OF ALL RESPONSES TO PHQ9 QUESTIONS 1 & 2: 5
10. IF YOU CHECKED OFF ANY PROBLEMS, HOW DIFFICULT HAVE THESE PROBLEMS MADE IT FOR YOU TO DO YOUR WORK, TAKE CARE OF THINGS AT HOME, OR GET ALONG WITH OTHER PEOPLE: 0
3. TROUBLE FALLING OR STAYING ASLEEP: 2

## 2023-12-12 ASSESSMENT — LIFESTYLE VARIABLES
HOW MANY STANDARD DRINKS CONTAINING ALCOHOL DO YOU HAVE ON A TYPICAL DAY: PATIENT DOES NOT DRINK
HOW OFTEN DO YOU HAVE A DRINK CONTAINING ALCOHOL: NEVER

## 2023-12-12 NOTE — ACP (ADVANCE CARE PLANNING)
Advance Care Planning     Advance Care Planning (ACP) Physician/NP/PA (Provider) Conversation      Date of ACP Conversation: 12/12/2023    Conversation Conducted with:   Patient with Decision Making [id number] Decision Maker:    Current Designated Health Care Decision Maker:    Primary Decision Maker (Active): Jax Mancera Child - [phone]      Care Preferences:    Hospitalization: \"If your health worsens and it becomes clear that your chance of recovery is unlikely, what would your preference be regarding hospitalization? \"  If the patient would want hospitalization, answer \"yes\". If the patient would prefer comfort-focused treatment without hospitalization, answer \"no\". YES/NO/WILD CARDS: yes      Ventilation: \"If you were in your present state of health and suddenly became very ill and were unable to breathe on your own, what would your preference be about the use of a ventilator (breathing machine) if it was available to you? \"    If patient would desire the use of a ventilator (breathing machine), answer \"yes\", if not answer \"no\":yes    \"If your health worsens and it becomes clear that your chance of recovery is unlikely, what would your preference be about the use of a ventilator (breathing machine) if it was available to you? \"   yes    Resuscitation:  \"CPR works best to restart the heart when there is a sudden event, like a heart attack, in someone who is otherwise healthy. Unfortunately, CPR does not typically restart the heart for people who have serious health conditions or who are very sick. \"    \"In the event your heart stopped as a result of an underlying serious health condition, would you want attempts to be made to restart your heart (answer \"yes\" for attempt to resuscitate) or would you prefer a natural death (answer \"no\" for do not attempt to resuscitate)? \"   yes       Conversation Outcomes / Follow-Up Plan:   Recommended completion of Advance Directive      Length of Voluntary

## 2023-12-12 NOTE — PROGRESS NOTES
ACE Markham comes in for follow-up care. Rhinitis: Patient has nasal congestion, postnasal drainage, sneezing and allergy. She has tried over-the-counter nasal sprays without much relief. She has also used Flonase without much relief. She has taken Claritin and this has not helped much. We discussed options. He is scheduled to see the ENT specialist later this month. In the meantime I will send in Singulair and Allegra to take daily. We discussed supportive care. Migraine headaches: Patient has a history of migraine headaches. She has flashing lights and photophobia. This has been ongoing for weeks. She feels that the migraines have become more severe. She takes Tylenol with transient relief. She requests referral to see the neurologist.  Referral will be placed. We discussed supportive care. Patient would like to hold off on any migraine prophylaxis until he has seen the specialist.  Hyperglycemia: Will check HbA1c today. Hypertension: Patient has hypertension. She is on amlodipine 5 mg daily. Blood pressure is stable. Denies focal weakness. We will continue with the current treatment plan. Dyslipidemia: Patient has dyslipidemia. She is on atorvastatin. Takes 20 mg daily. She will access to take a diet low in polysaturated fats. Will recheck lipid panel. Arthritis: Patient has arthritis with generalized joint aches and pains. She is on meloxicam daily. She will continue with current treatment plan. Obesity: Patient has a BMI of 37.38. She will intensify lifestyle and dietary modification. Health maintenance: Patient states that she had Pap smear done. Will request records. States that she is scheduled to get a colonoscopy done next year.       Past Medical History  Past Medical History:   Diagnosis Date    Arthritis     Coagulation disorder ([address])     Current tear knee, medial meniscus     Right knee    Depression     H/O seasonal allergies     Hiatal hernia     causes

## 2023-12-12 NOTE — PROGRESS NOTES
1. \"Have you been to the ER, urgent care clinic since your last visit? Hospitalized since your last visit? \"No    2. \"Have you seen or consulted any other health care providers outside of the [address] [place] [address] since your last visit? \" No    3. For patients aged 43-73: Has the patient had a colonoscopy / FIT/ Cologuard? No      If the patient is female:    4. For patients aged 43-66: Has the patient had a mammogram within the past 2 years? Yes      5. For patients aged 21-65: Has the patient had a pap smear?  No

## 2024-01-11 ENCOUNTER — OFFICE VISIT (OUTPATIENT)
Age: 59
End: 2024-01-11
Payer: MEDICARE

## 2024-01-11 ENCOUNTER — OFFICE VISIT (OUTPATIENT)
Facility: CLINIC | Age: 59
End: 2024-01-11
Payer: MEDICARE

## 2024-01-11 VITALS
SYSTOLIC BLOOD PRESSURE: 139 MMHG | HEIGHT: 63 IN | TEMPERATURE: 98.2 F | BODY MASS INDEX: 35.97 KG/M2 | HEART RATE: 81 BPM | OXYGEN SATURATION: 95 % | DIASTOLIC BLOOD PRESSURE: 85 MMHG | RESPIRATION RATE: 20 BRPM | WEIGHT: 203 LBS

## 2024-01-11 DIAGNOSIS — M25.562 PAIN IN BOTH KNEES, UNSPECIFIED CHRONICITY: ICD-10-CM

## 2024-01-11 DIAGNOSIS — I10 ESSENTIAL (PRIMARY) HYPERTENSION: ICD-10-CM

## 2024-01-11 DIAGNOSIS — M25.561 ACUTE PAIN OF RIGHT KNEE: Primary | ICD-10-CM

## 2024-01-11 DIAGNOSIS — M25.561 PAIN IN BOTH KNEES, UNSPECIFIED CHRONICITY: ICD-10-CM

## 2024-01-11 DIAGNOSIS — R07.89 CHEST WALL PAIN: Primary | ICD-10-CM

## 2024-01-11 DIAGNOSIS — M17.11 PRIMARY OSTEOARTHRITIS OF RIGHT KNEE: ICD-10-CM

## 2024-01-11 DIAGNOSIS — M65.9 SYNOVITIS OF RIGHT KNEE: ICD-10-CM

## 2024-01-11 DIAGNOSIS — E78.5 HYPERLIPIDEMIA, UNSPECIFIED HYPERLIPIDEMIA TYPE: ICD-10-CM

## 2024-01-11 DIAGNOSIS — E66.9 OBESITY (BMI 30-39.9): ICD-10-CM

## 2024-01-11 PROBLEM — M19.90 ARTHRITIS: Status: ACTIVE | Noted: 2024-01-11

## 2024-01-11 PROCEDURE — 73560 X-RAY EXAM OF KNEE 1 OR 2: CPT | Performed by: ORTHOPAEDIC SURGERY

## 2024-01-11 PROCEDURE — 99214 OFFICE O/P EST MOD 30 MIN: CPT | Performed by: ORTHOPAEDIC SURGERY

## 2024-01-11 PROCEDURE — G8417 CALC BMI ABV UP PARAM F/U: HCPCS | Performed by: FAMILY MEDICINE

## 2024-01-11 PROCEDURE — G8484 FLU IMMUNIZE NO ADMIN: HCPCS | Performed by: ORTHOPAEDIC SURGERY

## 2024-01-11 PROCEDURE — 3079F DIAST BP 80-89 MM HG: CPT | Performed by: FAMILY MEDICINE

## 2024-01-11 PROCEDURE — 20611 DRAIN/INJ JOINT/BURSA W/US: CPT | Performed by: ORTHOPAEDIC SURGERY

## 2024-01-11 PROCEDURE — G8427 DOCREV CUR MEDS BY ELIG CLIN: HCPCS | Performed by: FAMILY MEDICINE

## 2024-01-11 PROCEDURE — 1036F TOBACCO NON-USER: CPT | Performed by: ORTHOPAEDIC SURGERY

## 2024-01-11 PROCEDURE — G8484 FLU IMMUNIZE NO ADMIN: HCPCS | Performed by: FAMILY MEDICINE

## 2024-01-11 PROCEDURE — G8427 DOCREV CUR MEDS BY ELIG CLIN: HCPCS | Performed by: ORTHOPAEDIC SURGERY

## 2024-01-11 PROCEDURE — 99213 OFFICE O/P EST LOW 20 MIN: CPT | Performed by: FAMILY MEDICINE

## 2024-01-11 PROCEDURE — 3017F COLORECTAL CA SCREEN DOC REV: CPT | Performed by: ORTHOPAEDIC SURGERY

## 2024-01-11 PROCEDURE — 3075F SYST BP GE 130 - 139MM HG: CPT | Performed by: FAMILY MEDICINE

## 2024-01-11 PROCEDURE — G8417 CALC BMI ABV UP PARAM F/U: HCPCS | Performed by: ORTHOPAEDIC SURGERY

## 2024-01-11 PROCEDURE — 3017F COLORECTAL CA SCREEN DOC REV: CPT | Performed by: FAMILY MEDICINE

## 2024-01-11 PROCEDURE — 1036F TOBACCO NON-USER: CPT | Performed by: FAMILY MEDICINE

## 2024-01-11 RX ORDER — LEVOCETIRIZINE DIHYDROCHLORIDE 5 MG/1
TABLET, FILM COATED ORAL
COMMUNITY
Start: 2023-12-27

## 2024-01-11 RX ORDER — LIDOCAINE HYDROCHLORIDE 10 MG/ML
4 INJECTION, SOLUTION INFILTRATION; PERINEURAL ONCE
Status: COMPLETED | OUTPATIENT
Start: 2024-01-11 | End: 2024-01-11

## 2024-01-11 RX ORDER — TRIAMCINOLONE ACETONIDE 40 MG/ML
40 INJECTION, SUSPENSION INTRA-ARTICULAR; INTRAMUSCULAR ONCE
Status: COMPLETED | OUTPATIENT
Start: 2024-01-11 | End: 2024-01-11

## 2024-01-11 RX ADMIN — LIDOCAINE HYDROCHLORIDE 4 ML: 10 INJECTION, SOLUTION INFILTRATION; PERINEURAL at 16:53

## 2024-01-11 RX ADMIN — TRIAMCINOLONE ACETONIDE 40 MG: 40 INJECTION, SUSPENSION INTRA-ARTICULAR; INTRAMUSCULAR at 16:54

## 2024-01-11 ASSESSMENT — PATIENT HEALTH QUESTIONNAIRE - PHQ9
SUM OF ALL RESPONSES TO PHQ QUESTIONS 1-9: 0
SUM OF ALL RESPONSES TO PHQ9 QUESTIONS 1 & 2: 0
SUM OF ALL RESPONSES TO PHQ QUESTIONS 1-9: 0
2. FEELING DOWN, DEPRESSED OR HOPELESS: 0
SUM OF ALL RESPONSES TO PHQ QUESTIONS 1-9: 0
1. LITTLE INTEREST OR PLEASURE IN DOING THINGS: 0
SUM OF ALL RESPONSES TO PHQ QUESTIONS 1-9: 0
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
## 2024-01-11 NOTE — PROGRESS NOTES
1. \"Have you been to the ER, urgent care clinic since your last visit?  Hospitalized since your last visit?\"Yes When: [facility]  12-    2. \"Have you seen or consulted any other health care providers outside of the [facility] since your last visit?\" No    3. For patients aged 45-75: Has the patient had a colonoscopy / FIT/ Cologuard? No      If the patient is female:    4. For patients aged 40-74: Has the patient had a mammogram within the past 2 years? Yes      5. For patients aged 21-65: Has the patient had a pap smear? No

## 2024-01-12 ENCOUNTER — TELEPHONE (OUTPATIENT)
Age: 59
End: 2024-01-12

## 2024-01-12 RX ORDER — MELOXICAM 15 MG/1
15 TABLET ORAL DAILY
Qty: 30 TABLET | Refills: 3 | Status: SHIPPED | OUTPATIENT
Start: 2024-01-12

## 2024-01-12 NOTE — TELEPHONE ENCOUNTER
Patient states medication was discussed at 1/11 appointment and the provider was going to call in something for inflammation.     [employer]    Patient tel: [phone]

## 2024-01-22 RX ORDER — MELOXICAM 15 MG/1
15 TABLET ORAL DAILY
Qty: 30 TABLET | Refills: 3 | Status: SHIPPED | OUTPATIENT
Start: 2024-01-22

## 2024-02-05 ENCOUNTER — OFFICE VISIT (OUTPATIENT)
Age: 59
End: 2024-02-05
Payer: MEDICARE

## 2024-02-05 DIAGNOSIS — M65.9 SYNOVITIS OF RIGHT KNEE: ICD-10-CM

## 2024-02-05 DIAGNOSIS — S83.241D TEAR OF MEDIAL MENISCUS OF RIGHT KNEE, UNSPECIFIED TEAR TYPE, UNSPECIFIED WHETHER OLD OR CURRENT TEAR, SUBSEQUENT ENCOUNTER: ICD-10-CM

## 2024-02-05 DIAGNOSIS — M25.561 ACUTE PAIN OF RIGHT KNEE: ICD-10-CM

## 2024-02-05 DIAGNOSIS — M17.11 PRIMARY OSTEOARTHRITIS OF RIGHT KNEE: Primary | ICD-10-CM

## 2024-02-05 PROCEDURE — G8427 DOCREV CUR MEDS BY ELIG CLIN: HCPCS | Performed by: ORTHOPAEDIC SURGERY

## 2024-02-05 PROCEDURE — 99213 OFFICE O/P EST LOW 20 MIN: CPT | Performed by: ORTHOPAEDIC SURGERY

## 2024-02-05 PROCEDURE — 1036F TOBACCO NON-USER: CPT | Performed by: ORTHOPAEDIC SURGERY

## 2024-02-05 PROCEDURE — G8417 CALC BMI ABV UP PARAM F/U: HCPCS | Performed by: ORTHOPAEDIC SURGERY

## 2024-02-05 PROCEDURE — G8484 FLU IMMUNIZE NO ADMIN: HCPCS | Performed by: ORTHOPAEDIC SURGERY

## 2024-02-05 PROCEDURE — 3017F COLORECTAL CA SCREEN DOC REV: CPT | Performed by: ORTHOPAEDIC SURGERY
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
## 2024-02-05 NOTE — PATIENT INSTRUCTIONS
If we order a Diagnostic test (such as MRI or CT) during your office visit please see below:     Coordination of Care will be calling you to schedule your diagnostic test. If you have not heard from Coordination of Care within 3 business days, please call [phone].     Once you have a date scheduled for your diagnostic test, you will need to contact our office to schedule a follow up appointment, as this is when the physician will review your diagnostic test results with you. You can contact our office to schedule appointment by phone at [phone], or you can send a message via [employer] to request an appointment.    Right Knee  ordered today, 2/5/2024

## 2024-02-13 ENCOUNTER — HOSPITAL ENCOUNTER (OUTPATIENT)
Facility: HOSPITAL | Age: 59
Discharge: HOME | End: 2024-02-16
Attending: ORTHOPAEDIC SURGERY
Payer: MEDICARE

## 2024-02-13 DIAGNOSIS — S83.241D TEAR OF MEDIAL MENISCUS OF RIGHT KNEE, UNSPECIFIED TEAR TYPE, UNSPECIFIED WHETHER OLD OR CURRENT TEAR, SUBSEQUENT ENCOUNTER: ICD-10-CM

## 2024-02-13 PROCEDURE — 73721 MRI JNT OF LWR EXTRE W/O DYE: CPT

## 2024-02-20 ENCOUNTER — OFFICE VISIT (OUTPATIENT)
Age: 59
End: 2024-02-20
Payer: MEDICARE

## 2024-02-20 DIAGNOSIS — M65.9 SYNOVITIS OF RIGHT KNEE: ICD-10-CM

## 2024-02-20 DIAGNOSIS — M17.11 PRIMARY OSTEOARTHRITIS OF RIGHT KNEE: Primary | ICD-10-CM

## 2024-02-20 DIAGNOSIS — S83.241D TEAR OF MEDIAL MENISCUS OF RIGHT KNEE, UNSPECIFIED TEAR TYPE, UNSPECIFIED WHETHER OLD OR CURRENT TEAR, SUBSEQUENT ENCOUNTER: ICD-10-CM

## 2024-02-20 PROCEDURE — 3017F COLORECTAL CA SCREEN DOC REV: CPT | Performed by: ORTHOPAEDIC SURGERY

## 2024-02-20 PROCEDURE — 99214 OFFICE O/P EST MOD 30 MIN: CPT | Performed by: ORTHOPAEDIC SURGERY

## 2024-02-20 PROCEDURE — 1036F TOBACCO NON-USER: CPT | Performed by: ORTHOPAEDIC SURGERY

## 2024-02-20 PROCEDURE — G8417 CALC BMI ABV UP PARAM F/U: HCPCS | Performed by: ORTHOPAEDIC SURGERY

## 2024-02-20 PROCEDURE — G8427 DOCREV CUR MEDS BY ELIG CLIN: HCPCS | Performed by: ORTHOPAEDIC SURGERY

## 2024-02-20 PROCEDURE — G8484 FLU IMMUNIZE NO ADMIN: HCPCS | Performed by: ORTHOPAEDIC SURGERY

## 2024-02-20 NOTE — PROGRESS NOTES
Verito Morales  1965   Chief Complaint   Patient presents with    Follow-up     Right knee MRI        HISTORY OF PRESENT ILLNESS  Verito Morales is a 58 y.o. female who presents today for reevaluation of right knee pain and MRI review. Pain is a 6/10. Pt recalls that her previous cortisone injection did not provide her with lasting relief.     Patient denies any fever, chills, chest pain, shortness of breath or calf pain. The remainder of the review of systems is negative. There are no new illness or injuries to report since last seen in the office. No changes in medications, allergies, social or family history.      PHYSICAL EXAM:   There were no vitals taken for this visit.  The patient is a well-developed, well-nourished female   in no acute distress.  The patient is alert and oriented times three.  The patient is alert and oriented times three. Mood and affect are normal.  LYMPHATIC: lymph nodes are not enlarged and are within normal limits  SKIN: normal in color and non tender to palpation. There are no bruises or abrasions noted.   NEUROLOGICAL: Motor sensory exam is within normal limits. Reflexes are equal bilaterally. There is normal sensation to pinprick and light touch  ORTHOPEDIC EXAM of Right knee:  Inspection: Effusion  not present,  incisions well healed  TTP: none  Range of motion: 0-120 flexion  Stability: Stable  Strength: 5/5   2+ distal pulses     IMAGING: MRI of the right knee obtained from [facility] dated 02/13/2024 reviewed and read by Dr. Grant: Worsening high-grade chondrosis in the medial compartment weightbearing surfaces. Worsening high-grade chondrosis in the lateral compartment  weightbearing surface medial aspect.     Small joint effusion with increased synovitis.     Medial meniscus post partial meniscectomy changes with degeneration/tear in the meniscal body.     Mild edema in superolateral Hoffa's fat pad which can be seen with patellar maltracking and impingement.    XR of

## 2024-02-27 ENCOUNTER — OFFICE VISIT (OUTPATIENT)
Age: 59
End: 2024-02-27
Payer: MEDICARE

## 2024-02-27 VITALS — HEIGHT: 63 IN | BODY MASS INDEX: 36.32 KG/M2 | WEIGHT: 205 LBS

## 2024-02-27 DIAGNOSIS — S83.241D TEAR OF MEDIAL MENISCUS OF RIGHT KNEE, UNSPECIFIED TEAR TYPE, UNSPECIFIED WHETHER OLD OR CURRENT TEAR, SUBSEQUENT ENCOUNTER: ICD-10-CM

## 2024-02-27 DIAGNOSIS — M17.11 PRIMARY OSTEOARTHRITIS OF RIGHT KNEE: Primary | ICD-10-CM

## 2024-02-27 PROCEDURE — 1036F TOBACCO NON-USER: CPT | Performed by: ORTHOPAEDIC SURGERY

## 2024-02-27 PROCEDURE — 3017F COLORECTAL CA SCREEN DOC REV: CPT | Performed by: ORTHOPAEDIC SURGERY

## 2024-02-27 PROCEDURE — G8427 DOCREV CUR MEDS BY ELIG CLIN: HCPCS | Performed by: ORTHOPAEDIC SURGERY

## 2024-02-27 PROCEDURE — 99213 OFFICE O/P EST LOW 20 MIN: CPT | Performed by: ORTHOPAEDIC SURGERY

## 2024-02-27 PROCEDURE — G8417 CALC BMI ABV UP PARAM F/U: HCPCS | Performed by: ORTHOPAEDIC SURGERY

## 2024-02-27 PROCEDURE — G8484 FLU IMMUNIZE NO ADMIN: HCPCS | Performed by: ORTHOPAEDIC SURGERY

## 2024-02-27 NOTE — PROGRESS NOTES
Verito Morales  1965   Chief Complaint   Patient presents with    Knee Pain     rt        HISTORY OF PRESENT ILLNESS  Verito Morales is a 58 y.o. female who presents today for reevaluation of right knee pain. Pain is a 10/10. The patient continues with her right knee pain associated with painful catching and locking. The patient continues with difficulty and pain with walking and prolonged standing.     Patient denies any fever, chills, chest pain, shortness of breath or calf pain. The remainder of the review of systems is negative. There are no new illness or injuries to report since last seen in the office. No changes in medications, allergies, social or family history.      PHYSICAL EXAM:   Ht 1.6 m (5' 3\")   Wt 93 kg (205 lb)   BMI 36.31 kg/m²   The patient is a well-developed, well-nourished female   in no acute distress.  The patient is alert and oriented times three.  The patient is alert and oriented times three. Mood and affect are normal.  LYMPHATIC: lymph nodes are not enlarged and are within normal limits  SKIN: normal in color and non tender to palpation. There are no bruises or abrasions noted.   NEUROLOGICAL: Motor sensory exam is within normal limits. Reflexes are equal bilaterally. There is normal sensation to pinprick and light touch  ORTHOPEDIC EXAM of Right knee:  Inspection: Effusion  not present,  incisions well healed  TTP: none  Range of motion: 0-120 flexion  Stability: Stable  Strength: 5/5   2+ distal pulses       IMAGING: MRI of the right knee obtained from [facility] dated 02/13/2024 reviewed and read by Dr. Grant: Worsening high-grade chondrosis in the medial compartment weightbearing surfaces. Worsening high-grade chondrosis in the lateral compartment weightbearing surface medial aspect.     Small joint effusion with increased synovitis.     Medial meniscus post partial meniscectomy changes with degeneration/tear in the meniscal body.     Mild edema in superolateral Hoffa's 
Never

## 2024-04-02 ENCOUNTER — OFFICE VISIT (OUTPATIENT)
Age: 59
End: 2024-04-02
Payer: MEDICARE

## 2024-04-02 VITALS — WEIGHT: 205 LBS | HEIGHT: 63 IN | BODY MASS INDEX: 36.32 KG/M2

## 2024-04-02 DIAGNOSIS — M17.11 PRIMARY OSTEOARTHRITIS OF RIGHT KNEE: Primary | ICD-10-CM

## 2024-04-02 PROCEDURE — 20611 DRAIN/INJ JOINT/BURSA W/US: CPT | Performed by: ORTHOPAEDIC SURGERY

## 2024-04-02 NOTE — PROGRESS NOTES
Patient: Verito Morales                MRN: [id number]       SSN: [id number]  YOB: 1965        AGE: 58 y.o.        SEX: female  There is no height or weight on file to calculate BMI.    PCP: Ismael Curry MD  04/02/24    No chief complaint on file.      HISTORY:  Verito Morales is a 58 y.o. female who is seen for reevaluation of Right knee here for 1st injection of orthovisc    PROCEDURE: Right  knee Injection with Ultrasound Guidance    Indication:Right knee pain/swelling    After sterile prep, 2 cc of orthovisc were injected into the Right Knee. Intra-articular Ultrasound images captured using [employer] Ultrasound machine using a frequency of 10 MHz with a linear transducer and scanned into patient's chart.        [facility]  OFFICE PROCEDURE PROGRESS NOTE        Chart reviewed for the following:   Zion HYDE MD, have reviewed the History, Physical and updated the Allergic reactions for Verito Morales     TIME OUT performed immediately prior to start of procedure:   Zion HYDE MD, have performed the following reviews on Verito Morales prior to the start of the procedure:            * Patient was identified by name and date of birth   * Agreement on procedure being performed was verified  * Risks and Benefits explained to the patient  * Procedure site verified and marked as necessary  * Patient was positioned for comfort  * Consent was signed and verified     Time: 12:28 PM       Date of procedure: 4/2/2024    Procedure performed by:  Zion Grant MD    Provider assisted by: None     How tolerated by patient: tolerated the procedure well with no complications    Comments: none    IMPRESSION:     ICD-10-CM    1. Primary osteoarthritis of right knee  M17.11            PLAN:  Ms. Morales will return in one week for her second orthovisc injection.      Scribed by Gumaro Rodriguez) as dictated by Zion

## 2024-04-10 ENCOUNTER — OFFICE VISIT (OUTPATIENT)
Age: 59
End: 2024-04-10
Payer: MEDICARE

## 2024-04-10 VITALS — BODY MASS INDEX: 36.32 KG/M2 | HEIGHT: 63 IN | WEIGHT: 205 LBS

## 2024-04-10 DIAGNOSIS — M17.11 UNILATERAL PRIMARY OSTEOARTHRITIS, RIGHT KNEE: Primary | ICD-10-CM

## 2024-04-10 PROCEDURE — 20610 DRAIN/INJ JOINT/BURSA W/O US: CPT | Performed by: ORTHOPAEDIC SURGERY

## 2024-04-10 NOTE — PROGRESS NOTES
Patient: Verito Morales                MRN: [id number]       SSN: [id number]  YOB: 1965        AGE: 58 y.o.        SEX: female  There is no height or weight on file to calculate BMI.    PCP: Ismael Curry MD  04/10/24    No chief complaint on file.      HISTORY:  Verito Morales is a 58 y.o. female who is seen for reevaluation of Right knee here for 2nd injection of orthovisc    PROCEDURE: Right  knee Injection with Ultrasound Guidance    Indication:Right knee pain/swelling    After sterile prep, 2 cc of orthovisc were injected into the Right Knee. Intra-articular Ultrasound images captured using [employer] Ultrasound machine using a frequency of 10 MHz with a linear transducer and scanned into patient's chart.        [facility]  OFFICE PROCEDURE PROGRESS NOTE        Chart reviewed for the following:   Zion HYDE MD, have reviewed the History, Physical and updated the Allergic reactions for Verito Morales     TIME OUT performed immediately prior to start of procedure:   Zion HYDE MD, have performed the following reviews on Verito Morales prior to the start of the procedure:            * Patient was identified by name and date of birth   * Agreement on procedure being performed was verified  * Risks and Benefits explained to the patient  * Procedure site verified and marked as necessary  * Patient was positioned for comfort  * Consent was signed and verified     Time: 8:26 AM       Date of procedure: 4/10/2024    Procedure performed by:  Zion Grant MD    Provider assisted by: None     How tolerated by patient: tolerated the procedure well with no complications    Comments: none    IMPRESSION: No diagnosis found.     PLAN:  Ms. Morales will return in one week for her third orthovisc injection.      Scribed by Meghan StaffordVirtua Voorhees) as dictated by MD BARRETT Daley Dr. Ernesto Luciano-Perez,

## 2024-04-16 ENCOUNTER — OFFICE VISIT (OUTPATIENT)
Age: 59
End: 2024-04-16
Payer: MEDICARE

## 2024-04-16 VITALS — HEIGHT: 63 IN | WEIGHT: 205 LBS | BODY MASS INDEX: 36.32 KG/M2

## 2024-04-16 DIAGNOSIS — M17.11 PRIMARY OSTEOARTHRITIS OF RIGHT KNEE: Primary | ICD-10-CM

## 2024-04-16 PROCEDURE — 20611 DRAIN/INJ JOINT/BURSA W/US: CPT | Performed by: ORTHOPAEDIC SURGERY

## 2024-04-16 NOTE — PROGRESS NOTES
Patient: Verito Morales                MRN: [id number]       SSN: [id number]  YOB: 1965        AGE: 58 y.o.        SEX: female  There is no height or weight on file to calculate BMI.    PCP: Ismael Curry MD  04/16/24    No chief complaint on file.      HISTORY:  Verito Morales is a 58 y.o. female who is seen for reevaluation of Right knee here for 3rd injection of orthovisc    PROCEDURE: Right  knee Injection with Ultrasound Guidance    Indication:Right knee pain/swelling    After sterile prep, 2 cc of orthovisc were injected into the Right Knee. Intra-articular Ultrasound images captured using [employer] Ultrasound machine using a frequency of 10 MHz with a linear transducer and scanned into patient's chart.        [facility]  OFFICE PROCEDURE PROGRESS NOTE        Chart reviewed for the following:   Zion HYDE MD, have reviewed the History, Physical and updated the Allergic reactions for Verito Morales     TIME OUT performed immediately prior to start of procedure:   Zion HYDE MD, have performed the following reviews on Verito Morales prior to the start of the procedure:            * Patient was identified by name and date of birth   * Agreement on procedure being performed was verified  * Risks and Benefits explained to the patient  * Procedure site verified and marked as necessary  * Patient was positioned for comfort  * Consent was signed and verified     Time: 2:12 PM       Date of procedure: 4/16/2024    Procedure performed by:  Zion Grant MD    Provider assisted by: None     How tolerated by patient: tolerated the procedure well with no complications    Comments: none    IMPRESSION:     ICD-10-CM    1. Primary osteoarthritis of right knee  M17.11            PLAN:  Ms. Morales will return in one week for their fourth orthovisc injection.      Scribed by Gumaro Rodriguez) as dictated by Zion

## 2024-04-17 ENCOUNTER — TELEMEDICINE (OUTPATIENT)
Facility: CLINIC | Age: 59
End: 2024-04-17
Payer: MEDICARE

## 2024-04-17 DIAGNOSIS — R73.03 PREDIABETES: ICD-10-CM

## 2024-04-17 DIAGNOSIS — E78.5 HYPERLIPIDEMIA, UNSPECIFIED HYPERLIPIDEMIA TYPE: ICD-10-CM

## 2024-04-17 DIAGNOSIS — M25.562 PAIN IN BOTH KNEES, UNSPECIFIED CHRONICITY: ICD-10-CM

## 2024-04-17 DIAGNOSIS — I10 ESSENTIAL (PRIMARY) HYPERTENSION: Primary | ICD-10-CM

## 2024-04-17 DIAGNOSIS — M25.561 PAIN IN BOTH KNEES, UNSPECIFIED CHRONICITY: ICD-10-CM

## 2024-04-17 DIAGNOSIS — E66.9 OBESITY (BMI 30-39.9): ICD-10-CM

## 2024-04-17 PROCEDURE — 3017F COLORECTAL CA SCREEN DOC REV: CPT | Performed by: FAMILY MEDICINE

## 2024-04-17 PROCEDURE — 99213 OFFICE O/P EST LOW 20 MIN: CPT | Performed by: FAMILY MEDICINE

## 2024-04-17 PROCEDURE — G8427 DOCREV CUR MEDS BY ELIG CLIN: HCPCS | Performed by: FAMILY MEDICINE

## 2024-04-17 ASSESSMENT — PATIENT HEALTH QUESTIONNAIRE - PHQ9
SUM OF ALL RESPONSES TO PHQ QUESTIONS 1-9: 0
SUM OF ALL RESPONSES TO PHQ9 QUESTIONS 1 & 2: 0
2. FEELING DOWN, DEPRESSED OR HOPELESS: NOT AT ALL
1. LITTLE INTEREST OR PLEASURE IN DOING THINGS: NOT AT ALL

## 2024-04-17 NOTE — PROGRESS NOTES
\"Have you been to the ER, urgent care clinic since your last visit?  Hospitalized since your last visit?\"    NO    “Have you seen or consulted any other health care providers outside of [facility] since your last visit?”    NO     “Have you had a pap smear?”    YES - [facility]  Nurse/CMA to request most recent records if not in the chart    Date of last Cervical Cancer screen (HPV or PAP): 7/12/2017         “Have you had a colorectal cancer screening such as a colonoscopy/FIT/Cologuard?    NO  Scheduled for June    Date of last Colonoscopy: 2/27/2019  No cologuard on file  No FIT/FOBT on file   No flexible sigmoidoscopy on file         Click Here for Release of Records Request  
discoloration noted on facial skin         [] Abnormal-            Psychiatric:       [x] Normal Affect [] No Hallucinations        [] Abnormal-     Other pertinent observable physical exam findings-     ASSESSMENT/PLAN:   Diagnosis Orders   1. Essential (primary) hypertension        2. Pain in both knees, unspecified chronicity        3. Hyperlipidemia, unspecified hyperlipidemia type        4. Obesity (BMI 30-39.9)  Semaglutide, 1 MG/DOSE, 4 MG/3ML SOPN      5. Prediabetes  Semaglutide, 1 MG/DOSE, 4 MG/3ML SOPN        Return in about 1 month (around 5/17/2024), or if symptoms worsen or fail to improve, for OV , Hypertension, weight management.    Verito Morales, was evaluated through a synchronous (real-time) audio-video encounter. The patient (or guardian if applicable) is aware that this is a billable service, which includes applicable co-pays. This Virtual Visit was conducted with patient's (and/or legal guardian's) consent. Patient identification was verified, and a caregiver was present when appropriate.   The patient was located at Home: [address] [address]  Provider was located at Facility (Appt Dept): [address] [address]  Confirm you are appropriately licensed, registered, or certified to deliver care in the state where the patient is located as indicated above. If you are not or unsure, please re-schedule the visit: Yes, I confirm.       Total time spent on this encounter:  20 minutes    --Ismael Curry MD on 4/17/2024 at 8:12 AM    An electronic signature was used to authenticate this note.

## 2024-04-23 ENCOUNTER — OFFICE VISIT (OUTPATIENT)
Age: 59
End: 2024-04-23
Payer: MEDICARE

## 2024-04-23 DIAGNOSIS — M17.11 PRIMARY OSTEOARTHRITIS OF RIGHT KNEE: Primary | ICD-10-CM

## 2024-04-23 PROCEDURE — 20611 DRAIN/INJ JOINT/BURSA W/US: CPT | Performed by: ORTHOPAEDIC SURGERY

## 2024-04-23 NOTE — PROGRESS NOTES
Patient: Verito Morales                MRN: [id number]       SSN: [id number]  YOB: 1965        AGE: 58 y.o.        SEX: female  There is no height or weight on file to calculate BMI.    PCP: Ismael Curry MD  04/23/24    No chief complaint on file.      HISTORY:  Verito Morales is a 58 y.o. female who is seen for reevaluation of Right knee here for 4th injection of orthovisc    PROCEDURE: Right  knee Injection with Ultrasound Guidance    Indication:Right knee pain/swelling    After sterile prep, 2 cc of orthovisc were injected into the Right Knee. Intra-articular Ultrasound images captured using [employer] Ultrasound machine using a frequency of 10 MHz with a linear transducer and scanned into patient's chart.         OFFICE PROCEDURE PROGRESS NOTE        Chart reviewed for the following:   Zion HYDE MD, have reviewed the History, Physical and updated the Allergic reactions for Verito Morales     TIME OUT performed immediately prior to start of procedure:   Zion HYDE MD, have performed the following reviews on Verito Morales prior to the start of the procedure:            * Patient was identified by name and date of birth   * Agreement on procedure being performed was verified  * Risks and Benefits explained to the patient  * Procedure site verified and marked as necessary  * Patient was positioned for comfort  * Consent was signed and verified     Time: 12:52 PM       Date of procedure: 4/23/2024    Procedure performed by:  Zion Tleles MD    Provider assisted by: None     How tolerated by patient: tolerated the procedure well with no complications    Comments: none    IMPRESSION:     ICD-10-CM    1. Primary osteoarthritis of right knee  M17.11            PLAN:  Ms. Morales has completed their viscosupplementation series and will return PRN    Scribed by Gumaro Parra ([email or web address]) as dictated by MD BARRETT Daley Dr. Ernesto

## 2024-05-30 ENCOUNTER — OFFICE VISIT (OUTPATIENT)
Age: 59
End: 2024-05-30
Payer: MEDICARE

## 2024-05-30 DIAGNOSIS — M17.11 PRIMARY OSTEOARTHRITIS OF RIGHT KNEE: Primary | ICD-10-CM

## 2024-05-30 PROCEDURE — 99212 OFFICE O/P EST SF 10 MIN: CPT | Performed by: ORTHOPAEDIC SURGERY

## 2024-05-30 PROCEDURE — G8427 DOCREV CUR MEDS BY ELIG CLIN: HCPCS | Performed by: ORTHOPAEDIC SURGERY

## 2024-05-30 PROCEDURE — 1036F TOBACCO NON-USER: CPT | Performed by: ORTHOPAEDIC SURGERY

## 2024-05-30 PROCEDURE — G8417 CALC BMI ABV UP PARAM F/U: HCPCS | Performed by: ORTHOPAEDIC SURGERY

## 2024-05-30 PROCEDURE — 3017F COLORECTAL CA SCREEN DOC REV: CPT | Performed by: ORTHOPAEDIC SURGERY
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
## 2024-05-31 DIAGNOSIS — E78.5 HYPERLIPIDEMIA, UNSPECIFIED HYPERLIPIDEMIA TYPE: ICD-10-CM

## 2024-05-31 NOTE — TELEPHONE ENCOUNTER
Last seen 4/17/2024   Last labs 12/12/2023  Last filled  09/13/2023  Next appointment 12/12/2024     Lab Results   Component Value Date     12/12/2023    K 3.7 12/12/2023     12/12/2023    CO2 28 12/12/2023    BUN 11 12/12/2023    CREATININE 0.9 12/12/2023    GLUCOSE 115 (H) 12/12/2023    CALCIUM 9.7 12/12/2023    BILITOT 0.5 12/12/2023    ALKPHOS 111 12/12/2023    AST 17 12/12/2023    ALT 18 12/12/2023    LABGLOM >60.0 12/12/2023    GFRAA >60 04/13/2021    AGRATIO 1.3 12/12/2023    GLOB 3.4 12/12/2023

## 2024-06-03 RX ORDER — ATORVASTATIN CALCIUM 20 MG/1
20 TABLET, FILM COATED ORAL DAILY
Qty: 30 TABLET | Refills: 2 | Status: SHIPPED | OUTPATIENT
Start: 2024-06-03

## 2024-06-04 ENCOUNTER — TELEPHONE (OUTPATIENT)
Facility: CLINIC | Age: 59
End: 2024-06-04

## 2024-06-04 NOTE — TELEPHONE ENCOUNTER
Ins stated they need more info before they can approve the new medication     Semagoutide     They need more info as to why this patient needs this medication    Please f/u with Ms Morales once it is completed and you are available

## 2024-06-04 NOTE — TELEPHONE ENCOUNTER
Per insurance company they do not pay for this brand or type of medication-will resend denial letter

## 2024-06-06 ENCOUNTER — CLINICAL DOCUMENTATION (OUTPATIENT)
Facility: CLINIC | Age: 59
End: 2024-06-06

## 2024-06-06 NOTE — PROGRESS NOTES
At patient's request appeal was made to insurance company ([employer]) for her medication that was denied (Semiglutide

## 2024-06-07 ENCOUNTER — HOSPITAL ENCOUNTER (OUTPATIENT)
Age: 59
Discharge: HOME | End: 2024-06-10

## 2024-06-07 DIAGNOSIS — I10 ESSENTIAL HYPERTENSION: ICD-10-CM

## 2024-06-10 ENCOUNTER — TELEPHONE (OUTPATIENT)
Age: 59
End: 2024-06-10

## 2024-06-10 LAB
ALBUMIN SERPL-MCNC: 4.2 G/DL (ref 3.8–4.9)
ALBUMIN/GLOB SERPL: 1.5 {RATIO} (ref 1.2–2.2)
ALP SERPL-CCNC: 94 IU/L (ref 44–121)
ALT SERPL-CCNC: 21 IU/L (ref 0–32)
AST SERPL-CCNC: 21 IU/L (ref 0–40)
BILIRUB SERPL-MCNC: 0.3 MG/DL (ref 0–1.2)
BUN SERPL-MCNC: 9 MG/DL (ref 6–24)
BUN/CREAT SERPL: 9 (ref 9–23)
CALCIUM SERPL-MCNC: 9.5 MG/DL (ref 8.7–10.2)
CHLORIDE SERPL-SCNC: 105 MMOL/L (ref 96–106)
CO2 SERPL-SCNC: 24 MMOL/L (ref 20–29)
CREAT SERPL-MCNC: 0.98 MG/DL (ref 0.57–1)
EGFRCR SERPLBLD CKD-EPI 2021: 68 ML/MIN/1.73
GLOBULIN SER CALC-MCNC: 2.8 G/DL (ref 1.5–4.5)
GLUCOSE SERPL-MCNC: 99 MG/DL (ref 70–99)
POTASSIUM SERPL-SCNC: 4.1 MMOL/L (ref 3.5–5.2)
PROT SERPL-MCNC: 7 G/DL (ref 6–8.5)
SODIUM SERPL-SCNC: 143 MMOL/L (ref 134–144)
SPECIMEN STATUS REPORT: NORMAL

## 2024-06-10 NOTE — TELEPHONE ENCOUNTER
Pt called and cancelled colonoscopy for 06/12/24. Pt has been added to the wait list. I will call the pt in December to set a January 2025 procedure if there is nothing sooner.     Pt verbalized understanding.

## 2024-09-05 DIAGNOSIS — E78.5 HYPERLIPIDEMIA, UNSPECIFIED HYPERLIPIDEMIA TYPE: ICD-10-CM

## 2024-09-05 DIAGNOSIS — I10 ESSENTIAL (PRIMARY) HYPERTENSION: ICD-10-CM

## 2024-09-09 RX ORDER — AMLODIPINE BESYLATE 5 MG/1
TABLET ORAL
Qty: 90 TABLET | Refills: 1 | Status: SHIPPED | OUTPATIENT
Start: 2024-09-09

## 2024-09-09 RX ORDER — ATORVASTATIN CALCIUM 20 MG/1
20 TABLET, FILM COATED ORAL DAILY
Qty: 90 TABLET | Refills: 1 | Status: SHIPPED | OUTPATIENT
Start: 2024-09-09

## 2024-12-03 ENCOUNTER — OFFICE VISIT (OUTPATIENT)
Facility: CLINIC | Age: 59
End: 2024-12-03

## 2024-12-03 VITALS
BODY MASS INDEX: 38.38 KG/M2 | DIASTOLIC BLOOD PRESSURE: 79 MMHG | HEART RATE: 80 BPM | SYSTOLIC BLOOD PRESSURE: 131 MMHG | HEIGHT: 63 IN | OXYGEN SATURATION: 96 % | WEIGHT: 216.6 LBS | RESPIRATION RATE: 18 BRPM

## 2024-12-03 DIAGNOSIS — D75.89 MACROCYTOSIS: ICD-10-CM

## 2024-12-03 DIAGNOSIS — E66.01 SEVERE OBESITY (BMI 35.0-39.9) WITH COMORBIDITY: ICD-10-CM

## 2024-12-03 DIAGNOSIS — G62.9 NEUROPATHY: ICD-10-CM

## 2024-12-03 DIAGNOSIS — E78.5 HYPERLIPIDEMIA, UNSPECIFIED HYPERLIPIDEMIA TYPE: ICD-10-CM

## 2024-12-03 DIAGNOSIS — E55.9 VITAMIN D DEFICIENCY: ICD-10-CM

## 2024-12-03 DIAGNOSIS — R73.03 PREDIABETES: ICD-10-CM

## 2024-12-03 DIAGNOSIS — G47.00 INSOMNIA, UNSPECIFIED TYPE: ICD-10-CM

## 2024-12-03 DIAGNOSIS — I10 HYPERTENSION, UNSPECIFIED TYPE: Primary | ICD-10-CM

## 2024-12-03 DIAGNOSIS — F33.1 MODERATE EPISODE OF RECURRENT MAJOR DEPRESSIVE DISORDER (HCC): ICD-10-CM

## 2024-12-03 LAB — HBA1C MFR BLD: 5.6 %

## 2024-12-03 RX ORDER — MULTIVIT WITH MINERALS/LUTEIN
250 TABLET ORAL DAILY
COMMUNITY

## 2024-12-03 RX ORDER — UREA 10 %
500 LOTION (ML) TOPICAL DAILY
COMMUNITY

## 2024-12-03 SDOH — ECONOMIC STABILITY: FOOD INSECURITY: WITHIN THE PAST 12 MONTHS, YOU WORRIED THAT YOUR FOOD WOULD RUN OUT BEFORE YOU GOT MONEY TO BUY MORE.: NEVER TRUE

## 2024-12-03 SDOH — ECONOMIC STABILITY: FOOD INSECURITY: WITHIN THE PAST 12 MONTHS, THE FOOD YOU BOUGHT JUST DIDN'T LAST AND YOU DIDN'T HAVE MONEY TO GET MORE.: NEVER TRUE

## 2024-12-03 SDOH — ECONOMIC STABILITY: INCOME INSECURITY: HOW HARD IS IT FOR YOU TO PAY FOR THE VERY BASICS LIKE FOOD, HOUSING, MEDICAL CARE, AND HEATING?: NOT VERY HARD

## 2024-12-03 ASSESSMENT — ENCOUNTER SYMPTOMS
SHORTNESS OF BREATH: 0
TROUBLE SWALLOWING: 0
DIARRHEA: 0
COUGH: 0
ABDOMINAL PAIN: 0
NAUSEA: 0
BLOOD IN STOOL: 0
VOMITING: 0
CONSTIPATION: 0

## 2024-12-03 ASSESSMENT — PATIENT HEALTH QUESTIONNAIRE - PHQ9
7. TROUBLE CONCENTRATING ON THINGS, SUCH AS READING THE NEWSPAPER OR WATCHING TELEVISION: SEVERAL DAYS
SUM OF ALL RESPONSES TO PHQ QUESTIONS 1-9: 7
2. FEELING DOWN, DEPRESSED OR HOPELESS: NEARLY EVERY DAY
6. FEELING BAD ABOUT YOURSELF - OR THAT YOU ARE A FAILURE OR HAVE LET YOURSELF OR YOUR FAMILY DOWN: NOT AT ALL
3. TROUBLE FALLING OR STAYING ASLEEP: NEARLY EVERY DAY
SUM OF ALL RESPONSES TO PHQ QUESTIONS 1-9: 7
5. POOR APPETITE OR OVEREATING: NOT AT ALL
SUM OF ALL RESPONSES TO PHQ9 QUESTIONS 1 & 2: 3
1. LITTLE INTEREST OR PLEASURE IN DOING THINGS: NOT AT ALL
4. FEELING TIRED OR HAVING LITTLE ENERGY: NOT AT ALL
9. THOUGHTS THAT YOU WOULD BE BETTER OFF DEAD, OR OF HURTING YOURSELF: NOT AT ALL
8. MOVING OR SPEAKING SO SLOWLY THAT OTHER PEOPLE COULD HAVE NOTICED. OR THE OPPOSITE, BEING SO FIGETY OR RESTLESS THAT YOU HAVE BEEN MOVING AROUND A LOT MORE THAN USUAL: NOT AT ALL
SUM OF ALL RESPONSES TO PHQ QUESTIONS 1-9: 7
10. IF YOU CHECKED OFF ANY PROBLEMS, HOW DIFFICULT HAVE THESE PROBLEMS MADE IT FOR YOU TO DO YOUR WORK, TAKE CARE OF THINGS AT HOME, OR GET ALONG WITH OTHER PEOPLE: NOT DIFFICULT AT ALL
SUM OF ALL RESPONSES TO PHQ QUESTIONS 1-9: 7

## 2024-12-03 NOTE — PROGRESS NOTES
Fingerstick for HBa1C done in middle finger by Paola Gu LPN per order of Joaquina Garcia MD after cleaning area with alcohol wipe.  Patient tolerated procedure well.   Used to be on Vistaril in past to help her sleep and it worked very well.   Chief Complaint   Patient presents with    New Patient       \"Have you been to the ER, urgent care clinic since your last visit?  Hospitalized since your last visit?\"    [facility] in [place] for bilateral tingling in hands    “Have you seen or consulted any other health care providers outside our system since your last visit?”    NO     “Have you had a pap smear?”    YES - Where: [facility] to request most recent records if not in the chart  BENJAMÍN signed  Date of last Cervical Cancer screen (HPV or PAP): 7/12/2017       “Have you had a colorectal cancer screening such as a colonoscopy/FIT/Cologuard?    YES - Type: Colonoscopy - Where: Dr. Lynn   Date of last Colonoscopy: 2/27/2019  No cologuard on file  No FIT/FOBT on file   No flexible sigmoidoscopy on file    Patient is scheduled for June 2024 for follow up Colonoscopy  
Needs: Unknown (12/3/2024)    PRAPARE - Transportation     Lack of Transportation (Medical): Not on file     Lack of Transportation (Non-Medical): No   Physical Activity: Sufficiently Active (12/12/2023)    Exercise Vital Sign     Days of Exercise per Week: 3 days     Minutes of Exercise per Session: 60 min   Stress: Not on file   Social Connections: Not on file   Intimate Partner Violence: Not on file   Housing Stability: Unknown (12/3/2024)    Housing Stability Vital Sign     Unable to Pay for Housing in the Last Year: Not on file     Number of Times Moved in the Last Year: Not on file     Homeless in the Last Year: No       Social History     Tobacco Use   Smoking Status Never   Smokeless Tobacco Never         Current Outpatient Medications:     Ascorbic Acid (VITAMIN C) 250 MG tablet, Take 1 tablet by mouth daily, Disp: , Rfl:     vitamin B-12 (CYANOCOBALAMIN) 500 MCG tablet, Take 1 tablet by mouth daily, Disp: , Rfl:     amLODIPine (NORVASC) 5 MG tablet, TAKE 1 TABLET BY MOUTH DAILY, Disp: 90 tablet, Rfl: 1    atorvastatin (LIPITOR) 20 MG tablet, TAKE 1 TABLET BY MOUTH DAILY, Disp: 90 tablet, Rfl: 1    Allergies   Allergen Reactions    Amoxicillin Other (See Comments)     Flu like symptoms    Amoxicillin-Pot Clavulanate Nausea And Vomiting and Other (See Comments)       /79   Pulse 80   Resp 18   Ht 1.6 m (5' 3\")   Wt 98.2 kg (216 lb 9.6 oz)   SpO2 96%   BMI 38.37 kg/m²     Physical Exam  Constitutional:       General: She is not in acute distress.     Appearance: She is not ill-appearing or toxic-appearing.   HENT:      Nose:      Comments: Mild deviated septum.     Mouth/Throat:      Mouth: Mucous membranes are moist.      Pharynx: Oropharynx is clear.      Comments: +A couple dental caries visible  Eyes:      General: No scleral icterus.     Conjunctiva/sclera: Conjunctivae normal.      Pupils: Pupils are equal, round, and reactive to light.   Cardiovascular:      Rate and Rhythm: Normal rate and

## 2024-12-05 ENCOUNTER — OFFICE VISIT (OUTPATIENT)
Age: 59
End: 2024-12-05

## 2024-12-05 VITALS — WEIGHT: 216 LBS | BODY MASS INDEX: 38.27 KG/M2 | TEMPERATURE: 97.5 F | HEIGHT: 63 IN

## 2024-12-05 DIAGNOSIS — M17.11 PRIMARY OSTEOARTHRITIS OF RIGHT KNEE: Primary | ICD-10-CM

## 2024-12-05 RX ORDER — MELOXICAM 15 MG/1
15 TABLET ORAL DAILY
Qty: 30 TABLET | Refills: 3 | Status: SHIPPED | OUTPATIENT
Start: 2024-12-05

## 2024-12-05 RX ORDER — TRIAMCINOLONE ACETONIDE 40 MG/ML
40 INJECTION, SUSPENSION INTRA-ARTICULAR; INTRAMUSCULAR ONCE
Status: COMPLETED | OUTPATIENT
Start: 2024-12-05 | End: 2024-12-05

## 2024-12-05 RX ADMIN — TRIAMCINOLONE ACETONIDE 40 MG: 40 INJECTION, SUSPENSION INTRA-ARTICULAR; INTRAMUSCULAR at 15:18

## 2024-12-05 NOTE — PROGRESS NOTES
documentation. I have authorized the scribe to complete the medical record entries input within this chart. I have reviewed the chart and agree that the record reflects my personal performance and is accurate and complete. [Electronically Signed: Zion Telles MD. 12/5/2024 7:43 AM EST]

## 2025-01-27 ENCOUNTER — NURSE ONLY (OUTPATIENT)
Facility: CLINIC | Age: 60
End: 2025-01-27
Payer: MEDICARE

## 2025-01-27 DIAGNOSIS — E66.01 SEVERE OBESITY (BMI 35.0-39.9) WITH COMORBIDITY: ICD-10-CM

## 2025-01-27 DIAGNOSIS — F33.1 MODERATE EPISODE OF RECURRENT MAJOR DEPRESSIVE DISORDER (HCC): ICD-10-CM

## 2025-01-27 DIAGNOSIS — E55.9 VITAMIN D DEFICIENCY: ICD-10-CM

## 2025-01-27 DIAGNOSIS — G62.9 NEUROPATHY: ICD-10-CM

## 2025-01-27 DIAGNOSIS — G47.00 INSOMNIA, UNSPECIFIED TYPE: ICD-10-CM

## 2025-01-27 DIAGNOSIS — D75.89 MACROCYTOSIS: ICD-10-CM

## 2025-01-27 DIAGNOSIS — I10 HYPERTENSION, UNSPECIFIED TYPE: ICD-10-CM

## 2025-01-27 DIAGNOSIS — E78.5 HYPERLIPIDEMIA, UNSPECIFIED HYPERLIPIDEMIA TYPE: Primary | ICD-10-CM

## 2025-01-27 PROCEDURE — 36415 COLL VENOUS BLD VENIPUNCTURE: CPT | Performed by: FAMILY MEDICINE

## 2025-01-27 SDOH — ECONOMIC STABILITY: FOOD INSECURITY: WITHIN THE PAST 12 MONTHS, YOU WORRIED THAT YOUR FOOD WOULD RUN OUT BEFORE YOU GOT MONEY TO BUY MORE.: NEVER TRUE

## 2025-01-27 SDOH — ECONOMIC STABILITY: FOOD INSECURITY: WITHIN THE PAST 12 MONTHS, THE FOOD YOU BOUGHT JUST DIDN'T LAST AND YOU DIDN'T HAVE MONEY TO GET MORE.: NEVER TRUE

## 2025-01-27 NOTE — PROGRESS NOTES
Verbal order from Joaquina Garcia MD to order labs/sign and draw them in office    Labs were drawn and sent to [facility] by Paola Gu LPN:    The following tubes were sent:    1 Lavendar, 0 Red, 2 SST, 0 Urine    Draw site right antecubital.  Patient tolerated draw with no distress.

## 2025-01-29 LAB
25(OH)D3+25(OH)D2 SERPL-MCNC: 28.3 NG/ML (ref 30–100)
ALBUMIN SERPL-MCNC: 3.8 G/DL (ref 3.8–4.9)
ALP SERPL-CCNC: 100 IU/L (ref 44–121)
ALT SERPL-CCNC: 15 IU/L (ref 0–32)
AST SERPL-CCNC: 15 IU/L (ref 0–40)
BASOPHILS # BLD AUTO: 0.1 X10E3/UL (ref 0–0.2)
BASOPHILS NFR BLD AUTO: 1 %
BILIRUB SERPL-MCNC: 0.3 MG/DL (ref 0–1.2)
BUN SERPL-MCNC: 13 MG/DL (ref 6–24)
BUN/CREAT SERPL: 14 (ref 9–23)
CALCIUM SERPL-MCNC: 9.4 MG/DL (ref 8.7–10.2)
CHLORIDE SERPL-SCNC: 103 MMOL/L (ref 96–106)
CHOLEST SERPL-MCNC: 211 MG/DL (ref 100–199)
CO2 SERPL-SCNC: 23 MMOL/L (ref 20–29)
CREAT SERPL-MCNC: 0.93 MG/DL (ref 0.57–1)
EGFRCR SERPLBLD CKD-EPI 2021: 72 ML/MIN/1.73
EOSINOPHIL # BLD AUTO: 0.2 X10E3/UL (ref 0–0.4)
EOSINOPHIL NFR BLD AUTO: 3 %
ERYTHROCYTE [DISTWIDTH] IN BLOOD BY AUTOMATED COUNT: 13 % (ref 11.7–15.4)
FOLATE SERPL-MCNC: 4.7 NG/ML
GLOBULIN SER CALC-MCNC: 2.7 G/DL (ref 1.5–4.5)
GLUCOSE SERPL-MCNC: 92 MG/DL (ref 70–99)
HCT VFR BLD AUTO: 43 % (ref 34–46.6)
HDLC SERPL-MCNC: 49 MG/DL
HGB BLD-MCNC: 14.3 G/DL (ref 11.1–15.9)
IMM GRANULOCYTES # BLD AUTO: 0 X10E3/UL (ref 0–0.1)
IMM GRANULOCYTES NFR BLD AUTO: 0 %
LDLC SERPL CALC-MCNC: 148 MG/DL (ref 0–99)
LYMPHOCYTES # BLD AUTO: 3.9 X10E3/UL (ref 0.7–3.1)
LYMPHOCYTES NFR BLD AUTO: 46 %
MCH RBC QN AUTO: 33.6 PG (ref 26.6–33)
MCHC RBC AUTO-ENTMCNC: 33.3 G/DL (ref 31.5–35.7)
MCV RBC AUTO: 101 FL (ref 79–97)
MONOCYTES # BLD AUTO: 0.5 X10E3/UL (ref 0.1–0.9)
MONOCYTES NFR BLD AUTO: 6 %
NEUTROPHILS # BLD AUTO: 3.7 X10E3/UL (ref 1.4–7)
NEUTROPHILS NFR BLD AUTO: 44 %
PLATELET # BLD AUTO: 353 X10E3/UL (ref 150–450)
POTASSIUM SERPL-SCNC: 4.1 MMOL/L (ref 3.5–5.2)
PROT SERPL-MCNC: 6.5 G/DL (ref 6–8.5)
RBC # BLD AUTO: 4.25 X10E6/UL (ref 3.77–5.28)
SODIUM SERPL-SCNC: 142 MMOL/L (ref 134–144)
SPECIMEN STATUS REPORT: NORMAL
TRIGL SERPL-MCNC: 80 MG/DL (ref 0–149)
TSH SERPL DL<=0.005 MIU/L-ACNC: 4.06 UIU/ML (ref 0.45–4.5)
VIT B12 SERPL-MCNC: 440 PG/ML (ref 232–1245)
VLDLC SERPL CALC-MCNC: 14 MG/DL (ref 5–40)
WBC # BLD AUTO: 8.4 X10E3/UL (ref 3.4–10.8)

## 2025-01-30 ENCOUNTER — OFFICE VISIT (OUTPATIENT)
Facility: CLINIC | Age: 60
End: 2025-01-30
Payer: MEDICARE

## 2025-01-30 VITALS
BODY MASS INDEX: 37.74 KG/M2 | RESPIRATION RATE: 18 BRPM | OXYGEN SATURATION: 97 % | DIASTOLIC BLOOD PRESSURE: 68 MMHG | TEMPERATURE: 97.8 F | SYSTOLIC BLOOD PRESSURE: 114 MMHG | HEIGHT: 63 IN | WEIGHT: 213 LBS | HEART RATE: 71 BPM

## 2025-01-30 DIAGNOSIS — D75.89 MACROCYTOSIS: Primary | ICD-10-CM

## 2025-01-30 DIAGNOSIS — E03.9 HYPOTHYROIDISM, UNSPECIFIED TYPE: ICD-10-CM

## 2025-01-30 DIAGNOSIS — E78.5 HYPERLIPIDEMIA, UNSPECIFIED HYPERLIPIDEMIA TYPE: ICD-10-CM

## 2025-01-30 DIAGNOSIS — F33.1 MODERATE EPISODE OF RECURRENT MAJOR DEPRESSIVE DISORDER (HCC): ICD-10-CM

## 2025-01-30 PROCEDURE — 3017F COLORECTAL CA SCREEN DOC REV: CPT | Performed by: FAMILY MEDICINE

## 2025-01-30 PROCEDURE — G8427 DOCREV CUR MEDS BY ELIG CLIN: HCPCS | Performed by: FAMILY MEDICINE

## 2025-01-30 PROCEDURE — 99214 OFFICE O/P EST MOD 30 MIN: CPT | Performed by: FAMILY MEDICINE

## 2025-01-30 PROCEDURE — G8417 CALC BMI ABV UP PARAM F/U: HCPCS | Performed by: FAMILY MEDICINE

## 2025-01-30 PROCEDURE — 1036F TOBACCO NON-USER: CPT | Performed by: FAMILY MEDICINE

## 2025-01-30 ASSESSMENT — PATIENT HEALTH QUESTIONNAIRE - PHQ9
SUM OF ALL RESPONSES TO PHQ9 QUESTIONS 1 & 2: 2
SUM OF ALL RESPONSES TO PHQ QUESTIONS 1-9: 2
2. FEELING DOWN, DEPRESSED OR HOPELESS: MORE THAN HALF THE DAYS
SUM OF ALL RESPONSES TO PHQ QUESTIONS 1-9: 2
SUM OF ALL RESPONSES TO PHQ QUESTIONS 1-9: 2
1. LITTLE INTEREST OR PLEASURE IN DOING THINGS: NOT AT ALL
SUM OF ALL RESPONSES TO PHQ QUESTIONS 1-9: 2

## 2025-01-30 NOTE — PROGRESS NOTES
Chief Complaint   Patient presents with    Follow-up     Chronic disease       \"Have you been to the ER, urgent care clinic since your last visit?  Hospitalized since your last visit?\"    NO    “Have you seen or consulted any other health care providers outside our system since your last visit?”    NO    “Have you had a colorectal cancer screening such as a colonoscopy/FIT/Cologuard?    NO

## 2025-01-30 NOTE — PATIENT INSTRUCTIONS
Verito,  For natural thyroid support, you may consider finding a multivitamin containing (~100-300% of daily recommended values):  Iodine  L- tyrosine  Selenium  Manganese     Change B12 supplement to a METHYLcobalamin    Please start a vitamin D3 ~1,000 units (25mcg) daily    You may be able to find a multivitamin with all of the above in one multivitamin!    You can switch your Atorvastatin to morning to help improve adherence.      Continue to focus on healthy nutrition and exercise as able.     Lets follow-up in 3 months w/ a lab draw prior; if the TSH has normalized and the MCV is still elevated then we should consider referral to Hematology, if the TSH remains elevated then we can re-consider starting a prescription thyroid medication.      ~Dr. Joaquina Garcia

## 2025-01-30 NOTE — PROGRESS NOTES
Verito Morales (: 1965) is a 59 y.o. female here for evaluation of the following chief concern(s):  Chronic condition management     ASSESSMENT/PLAN:  1. Macrocytosis  -     CBC with Auto Differential; Future  2. Moderate episode of recurrent major depressive disorder (HCC)  3. Hypothyroidism, unspecified type  -     TSH; Future  4. Hyperlipidemia, unspecified hyperlipidemia type  -     Lipid Panel; Future    Verito appears medically stable.    Normotensive; continue Amlodipine at current dose.    Personalized AVS;  \"Verito,  For natural thyroid support, you may consider finding a multivitamin containing (~100-300% of daily recommended values):  Iodine  L- tyrosine  Selenium  Manganese     Change B12 supplement to a METHYLcobalamin    Please start a vitamin D3 ~1,000 units (25mcg) daily    You may be able to find a multivitamin with all of the above in one multivitamin!    Continue to focus on healthy nutrition and exercise as able.     Lets follow-up in 3 months w/ a lab draw prior; if the TSH has normalized and the MCV is still elevated then we should consider referral to Hematology, if the TSH remains elevated then we can re-consider starting a prescription thyroid medication.      ~Dr. Joaquina Garcia\"    Return in about 3 months (around 2025) for follow-up chronic conditions or sooner if needed.    Verito Morales agrees with plan as above and has no additional questions at this time.       SUBJECTIVE/OBJECTIVE:    25 results reviewed;  CBC stable;  from 102 from 103, abd lymphs 3.9  CMP WNL  Lipids; , HDL 49; she misses her statin related to nighttime dosing   B9 WNL  B12 WNL  TSH 4  Vit D 28    She has seen Hematology in the past for chronic fatigue; eval unyielding  She is not taking PPI    Hx thyroid U/S WNL; related to hoarse voice.      HTN:  Meds: Amlodipine 5mg    HLD:  Meds: Atorvastatin 20mg  No personal hx of MI or CVA    Vit D deficiency no longer on

## 2025-03-08 DIAGNOSIS — E78.5 HYPERLIPIDEMIA, UNSPECIFIED HYPERLIPIDEMIA TYPE: ICD-10-CM

## 2025-03-08 DIAGNOSIS — I10 ESSENTIAL (PRIMARY) HYPERTENSION: ICD-10-CM

## 2025-03-10 RX ORDER — ATORVASTATIN CALCIUM 20 MG/1
20 TABLET, FILM COATED ORAL DAILY
Qty: 90 TABLET | Refills: 1 | OUTPATIENT
Start: 2025-03-10

## 2025-03-10 RX ORDER — AMLODIPINE BESYLATE 5 MG/1
5 TABLET ORAL DAILY
Qty: 90 TABLET | Refills: 1 | OUTPATIENT
Start: 2025-03-10

## 2025-03-25 ENCOUNTER — OFFICE VISIT (OUTPATIENT)
Age: 60
End: 2025-03-25
Payer: MEDICARE

## 2025-03-25 VITALS — WEIGHT: 211 LBS | BODY MASS INDEX: 37.39 KG/M2 | HEIGHT: 63 IN

## 2025-03-25 DIAGNOSIS — M17.11 PRIMARY OSTEOARTHRITIS OF RIGHT KNEE: Primary | ICD-10-CM

## 2025-03-25 PROCEDURE — 1036F TOBACCO NON-USER: CPT | Performed by: ORTHOPAEDIC SURGERY

## 2025-03-25 PROCEDURE — G8417 CALC BMI ABV UP PARAM F/U: HCPCS | Performed by: ORTHOPAEDIC SURGERY

## 2025-03-25 PROCEDURE — G8427 DOCREV CUR MEDS BY ELIG CLIN: HCPCS | Performed by: ORTHOPAEDIC SURGERY

## 2025-03-25 PROCEDURE — 3017F COLORECTAL CA SCREEN DOC REV: CPT | Performed by: ORTHOPAEDIC SURGERY

## 2025-03-25 PROCEDURE — 20611 DRAIN/INJ JOINT/BURSA W/US: CPT | Performed by: ORTHOPAEDIC SURGERY

## 2025-03-25 PROCEDURE — 99214 OFFICE O/P EST MOD 30 MIN: CPT | Performed by: ORTHOPAEDIC SURGERY

## 2025-03-25 RX ORDER — TRIAMCINOLONE ACETONIDE 40 MG/ML
40 INJECTION, SUSPENSION INTRA-ARTICULAR; INTRAMUSCULAR ONCE
Status: COMPLETED | OUTPATIENT
Start: 2025-03-25 | End: 2025-03-25

## 2025-03-25 RX ORDER — TRIAMCINOLONE ACETONIDE 40 MG/ML
40 INJECTION, SUSPENSION INTRA-ARTICULAR; INTRAMUSCULAR ONCE
Status: CANCELLED | OUTPATIENT
Start: 2025-03-25 | End: 2025-03-25

## 2025-03-25 RX ADMIN — TRIAMCINOLONE ACETONIDE 40 MG: 40 INJECTION, SUSPENSION INTRA-ARTICULAR; INTRAMUSCULAR at 14:59

## 2025-03-25 NOTE — PROGRESS NOTES
right knee Injection with Ultrasound Guidance    Indication:  right knee pain/swelling    After sterile prep, 4mL lidocaine with 1mL 40mg Kenalog were injected into the right knee intraarticular under ultrasound guidance. Ultrasound images captured and scanned into patient's chart.        [facility] - [facility]  OFFICE PROCEDURE PROGRESS NOTE        Chart reviewed for the following:  Zion HYDE MD, have reviewed the History, Physical and updated the Allergic reactions for Verito Morales     TIME OUT performed immediately prior to start of procedure:  Zion HYDE MD have performed the following reviews on Verito Morales prior to the start of the procedure:            * Patient was identified by name and date of birth   * Agreement on procedure being performed was verified  * Risks and Benefits explained to the patient  * Procedure site verified and marked as necessary  * Patient was positioned for comfort  * Consent was signed and verified     Time: 3:04 PM    Date of procedure: 3/25/2025    Procedure performed by:  Zion Grant MD    Provider assisted by: (see medication administration)    How tolerated by patient: tolerated the procedure well with no complications    Comments: none      IMAGING: XR of the right knee with 2 views obtained in office on 12/05/2024 reviewed and read by : Standing AP of both knees lateral of the right knee showed markedly decreased joint space on the medial compartment patellofemoral joint            MRI of the right knee obtained from [facility] dated 02/13/2024 reviewed and read by Dr. Grant: Worsening high-grade chondrosis in the medial compartment weightbearing surfaces. Worsening high-grade chondrosis in the lateral compartment weightbearing surface medial aspect.     Small joint effusion with increased synovitis.     Medial meniscus post partial meniscectomy changes with degeneration/tear in the meniscal body.     Mild edema

## 2025-04-22 ENCOUNTER — OFFICE VISIT (OUTPATIENT)
Age: 60
End: 2025-04-22
Payer: MEDICARE

## 2025-04-22 DIAGNOSIS — M17.11 PRIMARY OSTEOARTHRITIS OF RIGHT KNEE: Primary | ICD-10-CM

## 2025-04-22 PROCEDURE — G8427 DOCREV CUR MEDS BY ELIG CLIN: HCPCS | Performed by: ORTHOPAEDIC SURGERY

## 2025-04-22 PROCEDURE — 99213 OFFICE O/P EST LOW 20 MIN: CPT | Performed by: ORTHOPAEDIC SURGERY

## 2025-04-22 PROCEDURE — 1036F TOBACCO NON-USER: CPT | Performed by: ORTHOPAEDIC SURGERY

## 2025-04-22 PROCEDURE — 3017F COLORECTAL CA SCREEN DOC REV: CPT | Performed by: ORTHOPAEDIC SURGERY

## 2025-04-22 PROCEDURE — G8417 CALC BMI ABV UP PARAM F/U: HCPCS | Performed by: ORTHOPAEDIC SURGERY

## 2025-04-22 NOTE — PROGRESS NOTES
Verito Morales  1965   Chief Complaint   Patient presents with    Knee Pain     Right knee pain        HISTORY OF PRESENT ILLNESS  Verito Morales is a 59 y.o. female who presents today for reevaluation of right knee pain. Pain is a 7/10. Pt received a right knee cortisone injection last OV on 3/25/2025 in which her knee responded to for 10 days. Still having tightness and pain with prolonged activity.     Patient denies any fever, chills, chest pain, shortness of breath or calf pain. The remainder of the review of systems is negative. There are no new illness or injuries other than that mentioned above to report since last seen in the office. No changes in medications, allergies, social or family history.      PHYSICAL EXAM:   There were no vitals taken for this visit.  The patient is a well-developed, well-nourished female   in no acute distress.  The patient is alert and oriented times three.  The patient is alert and oriented times three. Mood and affect are normal.  LYMPHATIC: lymph nodes are not enlarged and are within normal limits  SKIN: normal in color and non tender to palpation. There are no bruises or abrasions noted.   NEUROLOGICAL: Motor sensory exam is within normal limits. Reflexes are equal bilaterally. There is normal sensation to pinprick and light touch  MUSCULOSKELETAL: Diffuse swelling with medial joint line tenderness crepitation         PROCEDURE: none    IMAGING: XR of the right knee with 2 views obtained in office on 12/05/2024 reviewed and read by : Standing AP of both knees lateral of the right knee showed markedly decreased joint space on the medial compartment patellofemoral joint            MRI of the right knee obtained from [facility] dated 02/13/2024 reviewed and read by Dr. Grant: Worsening high-grade chondrosis in the medial compartment weightbearing surfaces. Worsening high-grade chondrosis in the lateral compartment weightbearing surface medial aspect.     Small

## 2025-05-19 NOTE — PROGRESS NOTES
direction and in the presence of Zion Telles MD  Electronically signed: SERGIO Goel. 5/19/2025 1:38 PM EDT    I, Zion Telles MD, personally performed the services described in this documentation. I have authorized the scribe to complete the medical record entries input within this chart. I have reviewed the chart and agree that the record reflects my personal performance and is accurate and complete. [Electronically Signed: Zino Telles MD. 5/19/2025 1:38 PM EDT]

## 2025-05-20 ENCOUNTER — OFFICE VISIT (OUTPATIENT)
Age: 60
End: 2025-05-20
Payer: MEDICARE

## 2025-05-20 VITALS — HEIGHT: 63 IN | WEIGHT: 211 LBS | BODY MASS INDEX: 37.39 KG/M2

## 2025-05-20 DIAGNOSIS — M17.11 PRIMARY OSTEOARTHRITIS OF RIGHT KNEE: Primary | ICD-10-CM

## 2025-05-20 PROCEDURE — 20611 DRAIN/INJ JOINT/BURSA W/US: CPT | Performed by: ORTHOPAEDIC SURGERY

## 2025-05-22 NOTE — PROGRESS NOTES
Patient: Verito Morales                MRN: [id number]       SSN: [id number]  YOB: 1965        AGE: 59 y.o.        SEX: female  There is no height or weight on file to calculate BMI.    PCP: Joaquina Garcia MD  05/22/25    No chief complaint on file.      HISTORY:  Verito Morales is a 59 y.o. female who is seen for reevaluation of Right knee here for 2nd injection of orthovisc    PROCEDURE: Right  knee Injection with Ultrasound Guidance    Indication:Right knee pain/swelling    After sterile prep, 2 cc of orthovisc were injected into the Right Knee. Intra-articular Ultrasound images captured using [employer] Ultrasound machine using a frequency of 10 MHz with a linear transducer and scanned into patient's chart.        [facility] - [facility]  OFFICE PROCEDURE PROGRESS NOTE        Chart reviewed for the following:   Zion HYDE MD, have reviewed the History, Physical and updated the Allergic reactions for Verito Morales     TIME OUT performed immediately prior to start of procedure:   Zion HYDE MD, have performed the following reviews on Verito Morales prior to the start of the procedure:            * Patient was identified by name and date of birth   * Agreement on procedure being performed was verified  * Risks and Benefits explained to the patient  * Procedure site verified and marked as necessary  * Patient was positioned for comfort  * Consent was signed and verified     Time: 2:42 PM       Date of procedure: 5/22/2025    Procedure performed by:  Zion Grant MD    Provider assisted by: None     How tolerated by patient: tolerated the procedure well with no complications    Comments: none    IMPRESSION: No diagnosis found.     PLAN:  Ms. Morales will return in one week for her third orthovisc injection.      By signing my name below, SERGIO Minor, attest that this documentation has been prepared under the

## 2025-05-27 ENCOUNTER — OFFICE VISIT (OUTPATIENT)
Age: 60
End: 2025-05-27
Payer: MEDICARE

## 2025-05-27 VITALS — BODY MASS INDEX: 37.39 KG/M2 | HEIGHT: 63 IN | WEIGHT: 211 LBS

## 2025-05-27 DIAGNOSIS — Z12.11 COLON CANCER SCREENING: ICD-10-CM

## 2025-05-27 DIAGNOSIS — M17.11 PRIMARY OSTEOARTHRITIS OF RIGHT KNEE: Primary | ICD-10-CM

## 2025-05-27 DIAGNOSIS — I10 ESSENTIAL HYPERTENSION: Primary | ICD-10-CM

## 2025-05-27 PROCEDURE — 20611 DRAIN/INJ JOINT/BURSA W/US: CPT | Performed by: ORTHOPAEDIC SURGERY

## 2025-05-28 ENCOUNTER — PREP FOR PROCEDURE (OUTPATIENT)
Age: 60
End: 2025-05-28

## 2025-05-28 DIAGNOSIS — Z80.0 FAMILY HISTORY OF COLON CANCER: ICD-10-CM

## 2025-05-28 DIAGNOSIS — Z12.11 COLON CANCER SCREENING: ICD-10-CM

## 2025-05-30 NOTE — PERIOP NOTE
Instructions for your procedure at [facility]      Today's Date: 5/30/2025      Patient's Name: Verito Morales      Procedure Date: 06/04/2025        Please enter the main entrance of the hospital and check-in at the  located in the lobby.      Do NOT eat or drink anything, including candy, gum, or ice chips after midnight prior to your procedure, unless it is part of your prep.  Brush your teeth before coming to the hospital.You may swish with water, but do not swallow.  No smoking/Vaping/E-Cigarettes 24 hours prior to the day of procedure.  No alcohol 24 hours prior to the day of procedure.  No recreational drugs for one week prior to the day of procedure.  Bring Photo ID, Insurance information, and Co-pay if required on day of procedure.  Bring in pertinent legal documents, such as, Medical Power of , DNR, Advance Directive, etc.  Leave all other valuables, including money/purse, weapons at home.  Remove jewelry, including ALL body piercings, nail polish, acrylic nails, and makeup (including mascara); no lotions, powders, deodorant, and/or perfume/cologne/after shave on the skin.  Glasses and dentures may be worn to the hospital.  They must be removed prior to procedure. Please bring case/container for glasses or dentures.  11. Contacts should not be worn on day of procedure.   12. Call the office ([phone]) if you have symptoms of a cold or illness within 24-48 hours prior to your procedure.   13. AN ADULT (relative or friend 18 years or older) MUST DRIVE YOU HOME AFTER YOUR PROCEDURE.   14. Please make arrangements for a responsible adult (18 years or older) to be with you for 24 hours after your procedure.   15. TWO VISITORS will be allowed in the waiting area during your procedure.       Special Instructions:      Bring list of CURRENT medications.  Follow instructions from the office regarding Bowel Prep, Vitamins, Iron, Blood Thinners, Insulin, Seizure, and

## 2025-06-02 NOTE — PROGRESS NOTES
Patient: Verito Morales                MRN: [id number]       SSN: [id number]  YOB: 1965        AGE: 59 y.o.        SEX: female  There is no height or weight on file to calculate BMI.    PCP: Joaquina Garcia MD  06/02/25    No chief complaint on file.      HISTORY:  Verito Morales is a 59 y.o. female who is seen for reevaluation of Right knee here for 3rd injection of orthovisc    PROCEDURE: Right  knee Injection with Ultrasound Guidance    Indication:Right knee pain/swelling    After sterile prep, 2 cc of orthovisc were injected into the Right Knee. Intra-articular Ultrasound images captured using [employer] Ultrasound machine using a frequency of 10 MHz with a linear transducer and scanned into patient's chart.        [facility] - [facility]  OFFICE PROCEDURE PROGRESS NOTE        Chart reviewed for the following:   Zion HYDE MD, have reviewed the History, Physical and updated the Allergic reactions for Verito Morales     TIME OUT performed immediately prior to start of procedure:   Zion HYDE MD, have performed the following reviews on Verito Morales prior to the start of the procedure:            * Patient was identified by name and date of birth   * Agreement on procedure being performed was verified  * Risks and Benefits explained to the patient  * Procedure site verified and marked as necessary  * Patient was positioned for comfort  * Consent was signed and verified     Time: 1:54 PM       Date of procedure: 6/2/2025    Procedure performed by:  Zion Grant MD    Provider assisted by: None     How tolerated by patient: tolerated the procedure well with no complications    Comments: none    IMPRESSION: No diagnosis found.     PLAN:  Ms. Morales will return in one week for their fourth orthovisc injection.      By signing my name below, Chris HYDE SCRIBE, attest that this documentation has been prepared under the

## 2025-06-03 ENCOUNTER — OFFICE VISIT (OUTPATIENT)
Age: 60
End: 2025-06-03
Payer: MEDICARE

## 2025-06-03 VITALS — WEIGHT: 210.4 LBS | HEIGHT: 63 IN | BODY MASS INDEX: 37.28 KG/M2

## 2025-06-03 DIAGNOSIS — M17.11 PRIMARY OSTEOARTHRITIS OF RIGHT KNEE: Primary | ICD-10-CM

## 2025-06-03 PROCEDURE — 20611 DRAIN/INJ JOINT/BURSA W/US: CPT | Performed by: ORTHOPAEDIC SURGERY

## 2025-06-06 ENCOUNTER — HOSPITAL ENCOUNTER (OUTPATIENT)
Age: 60
Discharge: HOME | End: 2025-06-09
Payer: MEDICARE

## 2025-06-06 ENCOUNTER — HOSPITAL ENCOUNTER (OUTPATIENT)
Age: 60
Setting detail: SPECIMEN
Discharge: HOME | End: 2025-06-09

## 2025-06-06 DIAGNOSIS — I10 ESSENTIAL HYPERTENSION: ICD-10-CM

## 2025-06-06 LAB — LABCORP DRAW FEE: NORMAL

## 2025-06-06 PROCEDURE — 93005 ELECTROCARDIOGRAM TRACING: CPT

## 2025-06-06 PROCEDURE — 99001 SPECIMEN HANDLING PT-LAB: CPT

## 2025-06-08 LAB
EKG ATRIAL RATE: 86 BPM
EKG DIAGNOSIS: NORMAL
EKG P AXIS: 43 DEGREES
EKG P-R INTERVAL: 182 MS
EKG Q-T INTERVAL: 402 MS
EKG QRS DURATION: 82 MS
EKG QTC CALCULATION (BAZETT): 481 MS
EKG R AXIS: 12 DEGREES
EKG T AXIS: 31 DEGREES
EKG VENTRICULAR RATE: 86 BPM

## 2025-06-09 ENCOUNTER — HOSPITAL ENCOUNTER (OUTPATIENT)
Age: 60
Discharge: HOME | End: 2025-06-12

## 2025-06-09 ENCOUNTER — TELEPHONE (OUTPATIENT)
Age: 60
End: 2025-06-09

## 2025-06-09 LAB — LABCORP DRAW FEE: NORMAL

## 2025-06-09 PROCEDURE — 99001 SPECIMEN HANDLING PT-LAB: CPT

## 2025-06-10 ENCOUNTER — ANESTHESIA EVENT (OUTPATIENT)
Facility: HOSPITAL | Age: 60
End: 2025-06-10
Payer: MEDICARE

## 2025-06-10 ENCOUNTER — TELEPHONE (OUTPATIENT)
Age: 60
End: 2025-06-10

## 2025-06-10 NOTE — PROGRESS NOTES
MD Elfego  Electronically signed: SERGIO Kenny. 6/10/25 11:26 AM EDT     I, Zion Telles MD, personally performed the services described in this documentation. I have authorized the scribe to complete the medical record entries input within this chart. I have reviewed the chart and agree that the record reflects my personal performance and is accurate and complete. [Electronically Signed: Zion Telles MD. 6/10/2025 11:26 AM EDT]

## 2025-06-11 ENCOUNTER — ANESTHESIA (OUTPATIENT)
Facility: HOSPITAL | Age: 60
End: 2025-06-11
Payer: MEDICARE

## 2025-06-11 ENCOUNTER — HOSPITAL ENCOUNTER (OUTPATIENT)
Facility: HOSPITAL | Age: 60
Setting detail: OUTPATIENT SURGERY
Discharge: HOME | End: 2025-06-11
Attending: COLON & RECTAL SURGERY | Admitting: COLON & RECTAL SURGERY
Payer: MEDICARE

## 2025-06-11 VITALS
HEIGHT: 63 IN | RESPIRATION RATE: 17 BRPM | SYSTOLIC BLOOD PRESSURE: 107 MMHG | WEIGHT: 207.4 LBS | TEMPERATURE: 98.1 F | BODY MASS INDEX: 36.75 KG/M2 | HEART RATE: 71 BPM | OXYGEN SATURATION: 100 % | DIASTOLIC BLOOD PRESSURE: 60 MMHG

## 2025-06-11 LAB
ALBUMIN SERPL-MCNC: 4.2 G/DL (ref 3.8–4.9)
ALP SERPL-CCNC: 103 IU/L (ref 44–121)
ALT SERPL-CCNC: 18 IU/L (ref 0–32)
AST SERPL-CCNC: 20 IU/L (ref 0–40)
BILIRUB SERPL-MCNC: 0.3 MG/DL (ref 0–1.2)
BUN SERPL-MCNC: 9 MG/DL (ref 6–24)
BUN/CREAT SERPL: 9 (ref 9–23)
CALCIUM SERPL-MCNC: 9.8 MG/DL (ref 8.7–10.2)
CHLORIDE SERPL-SCNC: 104 MMOL/L (ref 96–106)
CO2 SERPL-SCNC: 24 MMOL/L (ref 20–29)
CREAT SERPL-MCNC: 0.97 MG/DL (ref 0.57–1)
EGFRCR SERPLBLD CKD-EPI 2021: 68 ML/MIN/1.73
GLOBULIN SER CALC-MCNC: 2.7 G/DL (ref 1.5–4.5)
GLUCOSE SERPL-MCNC: 106 MG/DL (ref 70–99)
POTASSIUM SERPL-SCNC: 3.9 MMOL/L (ref 3.5–5.2)
PROT SERPL-MCNC: 6.9 G/DL (ref 6–8.5)
SODIUM SERPL-SCNC: 143 MMOL/L (ref 134–144)
SPECIMEN STATUS REPORT: NORMAL

## 2025-06-11 PROCEDURE — G0105 COLORECTAL SCRN; HI RISK IND: HCPCS | Performed by: COLON & RECTAL SURGERY

## 2025-06-11 PROCEDURE — 2580000003 HC RX 258: Performed by: NURSE ANESTHETIST, CERTIFIED REGISTERED

## 2025-06-11 PROCEDURE — 6360000002 HC RX W HCPCS: Performed by: NURSE ANESTHETIST, CERTIFIED REGISTERED

## 2025-06-11 PROCEDURE — 3600007512: Performed by: COLON & RECTAL SURGERY

## 2025-06-11 PROCEDURE — 7100000000 HC PACU RECOVERY - FIRST 15 MIN: Performed by: COLON & RECTAL SURGERY

## 2025-06-11 PROCEDURE — 7100000011 HC PHASE II RECOVERY - ADDTL 15 MIN: Performed by: COLON & RECTAL SURGERY

## 2025-06-11 PROCEDURE — 2709999900 HC NON-CHARGEABLE SUPPLY: Performed by: COLON & RECTAL SURGERY

## 2025-06-11 PROCEDURE — 3700000000 HC ANESTHESIA ATTENDED CARE: Performed by: COLON & RECTAL SURGERY

## 2025-06-11 PROCEDURE — 3600007502: Performed by: COLON & RECTAL SURGERY

## 2025-06-11 PROCEDURE — 3700000001 HC ADD 15 MINUTES (ANESTHESIA): Performed by: COLON & RECTAL SURGERY

## 2025-06-11 PROCEDURE — 7100000010 HC PHASE II RECOVERY - FIRST 15 MIN: Performed by: COLON & RECTAL SURGERY

## 2025-06-11 RX ORDER — SODIUM CHLORIDE, SODIUM LACTATE, POTASSIUM CHLORIDE, CALCIUM CHLORIDE 600; 310; 30; 20 MG/100ML; MG/100ML; MG/100ML; MG/100ML
INJECTION, SOLUTION INTRAVENOUS CONTINUOUS
Status: DISCONTINUED | OUTPATIENT
Start: 2025-06-11 | End: 2025-06-11 | Stop reason: HOSPADM

## 2025-06-11 RX ORDER — DIPHENHYDRAMINE HYDROCHLORIDE 50 MG/ML
12.5 INJECTION, SOLUTION INTRAMUSCULAR; INTRAVENOUS
Status: CANCELLED | OUTPATIENT
Start: 2025-06-11

## 2025-06-11 RX ORDER — SODIUM CHLORIDE 0.9 % (FLUSH) 0.9 %
5-40 SYRINGE (ML) INJECTION PRN
Status: CANCELLED | OUTPATIENT
Start: 2025-06-11

## 2025-06-11 RX ORDER — FAMOTIDINE 20 MG/1
20 TABLET, FILM COATED ORAL ONCE
Status: DISCONTINUED | OUTPATIENT
Start: 2025-06-11 | End: 2025-06-11 | Stop reason: HOSPADM

## 2025-06-11 RX ORDER — PROPOFOL 10 MG/ML
INJECTION, EMULSION INTRAVENOUS
Status: DISCONTINUED | OUTPATIENT
Start: 2025-06-11 | End: 2025-06-11 | Stop reason: SDUPTHER

## 2025-06-11 RX ORDER — LIDOCAINE HYDROCHLORIDE 10 MG/ML
1 INJECTION, SOLUTION EPIDURAL; INFILTRATION; INTRACAUDAL; PERINEURAL
Status: DISCONTINUED | OUTPATIENT
Start: 2025-06-11 | End: 2025-06-11 | Stop reason: HOSPADM

## 2025-06-11 RX ORDER — ONDANSETRON 2 MG/ML
4 INJECTION INTRAMUSCULAR; INTRAVENOUS
Status: CANCELLED | OUTPATIENT
Start: 2025-06-11

## 2025-06-11 RX ORDER — LIDOCAINE HYDROCHLORIDE 20 MG/ML
INJECTION, SOLUTION EPIDURAL; INFILTRATION; INTRACAUDAL; PERINEURAL
Status: DISCONTINUED | OUTPATIENT
Start: 2025-06-11 | End: 2025-06-11 | Stop reason: SDUPTHER

## 2025-06-11 RX ADMIN — PROPOFOL 50 MG: 10 INJECTION, EMULSION INTRAVENOUS at 10:14

## 2025-06-11 RX ADMIN — SODIUM CHLORIDE, SODIUM LACTATE, POTASSIUM CHLORIDE, AND CALCIUM CHLORIDE: .6; .31; .03; .02 INJECTION, SOLUTION INTRAVENOUS at 09:49

## 2025-06-11 RX ADMIN — PROPOFOL 50 MG: 10 INJECTION, EMULSION INTRAVENOUS at 10:07

## 2025-06-11 RX ADMIN — PROPOFOL 50 MG: 10 INJECTION, EMULSION INTRAVENOUS at 10:09

## 2025-06-11 RX ADMIN — PROPOFOL 100 MG: 10 INJECTION, EMULSION INTRAVENOUS at 10:03

## 2025-06-11 RX ADMIN — LIDOCAINE HYDROCHLORIDE 60 MG: 20 INJECTION, SOLUTION EPIDURAL; INFILTRATION; INTRACAUDAL; PERINEURAL at 10:03

## 2025-06-11 ASSESSMENT — PAIN - FUNCTIONAL ASSESSMENT
PAIN_FUNCTIONAL_ASSESSMENT: 0-10
PAIN_FUNCTIONAL_ASSESSMENT: 0-10

## 2025-06-11 NOTE — ANESTHESIA PRE PROCEDURE
Department of Anesthesiology  Preprocedure Note       Name:  Verito Morales   Age:  59 y.o.  :  1965                                          MRN:  [id number]         Date:  2025      Surgeon: Surgeon(s):  Guillermo Arrington MD    Procedure: Procedure(s):  COLONOSCOPY    Medications prior to admission:   Prior to Admission medications    Medication Sig Start Date End Date Taking? Authorizing Provider   Ascorbic Acid (VITAMIN C) 250 MG tablet Take 1 tablet by mouth daily   Yes Desean Travis MD   amLODIPine (NORVASC) 5 MG tablet TAKE 1 TABLET BY MOUTH DAILY 24  Yes Ismael Curry MD   meloxicam (MOBIC) 15 MG tablet Take 1 tablet by mouth daily  Patient taking differently: Take 1 tablet by mouth every other day 24   Zion Telles MD   atorvastatin (LIPITOR) 20 MG tablet TAKE 1 TABLET BY MOUTH DAILY 24   Ismael Curry MD       Current medications:    Current Facility-Administered Medications   Medication Dose Route Frequency Provider Last Rate Last Admin    lidocaine PF 1 % injection 1 mL  1 mL IntraDERmal Once PRN Brein, [place], APRN - CRNA        famotidine (PEPCID) tablet 20 mg  20 mg Oral Once Brein, Danielle, APRN - CRNA        lactated ringers infusion   IntraVENous Continuous Brein, [place], APRN - CRNA           Allergies:    Allergies   Allergen Reactions    Amoxicillin Other (See Comments)     Flu like symptoms    Amoxicillin-Pot Clavulanate Nausea And Vomiting and Other (See Comments)       Problem List:    Patient Active Problem List   Diagnosis Code    Migraine without status migrainosus, not intractable G43.909    Parotitis K11.20    Severe obesity (BMI 35.0-39.9) with comorbidity (HCC) E66.01    Arthritis M19.90    Moderate episode of recurrent major depressive disorder (HCC) F33.1    Colon cancer screening Z12.11    Family history of colon cancer Z80.0       Past Medical History:        Diagnosis Date    Arthritis     Current tear knee, medial meniscus     Right

## 2025-06-11 NOTE — H&P
Transportation Needs: No Transportation Needs (1/27/2025)    PRAPARE - Transportation     Lack of Transportation (Medical): No     Lack of Transportation (Non-Medical): No   Physical Activity: Sufficiently Active (12/12/2023)    Exercise Vital Sign     Days of Exercise per Week: 3 days     Minutes of Exercise per Session: 60 min   Housing Stability: Low Risk  (1/27/2025)    Housing Stability Vital Sign     Unable to Pay for Housing in the Last Year: No     Number of Times Moved in the Last Year: 0     Homeless in the Last Year: No       Current Outpatient Medications   Medication Instructions    amLODIPine (NORVASC) 5 MG tablet TAKE 1 TABLET BY MOUTH DAILY    atorvastatin (LIPITOR) 20 mg, Oral, DAILY    meloxicam (MOBIC) 15 mg, Oral, DAILY    vitamin C 250 mg, Oral, DAILY        Allergies   Allergen Reactions    Amoxicillin Other (See Comments)     Flu like symptoms    Amoxicillin-Pot Clavulanate Nausea And Vomiting and Other (See Comments)       ROS: negative    There were no vitals filed for this visit.    Physical Exam  Heart: RRR  Lungs: CTAB  Constitutional:       Appearance: He is well-developed.   HENT:      Head: Normocephalic and atraumatic.   Abdominal:      General: There is no distension.      Palpations: Abdomen is soft.      Tenderness: There is no abdominal tenderness.   Skin:     General: Skin is warm and dry    Assessment / Plan    colonoscopy    The diagnoses and plan were discussed with the patient. All questions answered.  Plan of care agreed to by all concerned.

## 2025-06-11 NOTE — OP NOTE
Colonoscopy Procedure Note      Verito Morales  1965  [id number]                Date of Procedure: 06/11/25    Preoperative diagnosis: [facility]    Postoperative diagnosis: Normal    :  Guillermo Arrington MD    Assistant(s): Circulator: Ines Covington RN; Alec Prieto, RN    Sedation: MAC    Complications: None    Implants: None    Procedure Details:  Prior to the procedure, a history and physical were performed. The patient’s medications, allergies and sensitivities were reviewed and all questions were answered.  After informed consent was obtained for the procedure, with all risks and benefits of procedure explained. The patient was taken to the endoscopy suite and placed in the left lateral decubitus position.  Patient identification and proposed procedure were verified prior to the procedure by the nurse and I. After sequential anesthesia administered by anesthesiologist, a digital rectal exam was performed and was normal.  The Olympus video colonoscope was introduced through the anus and advanced to cecum, which was identified by the ileocecal valve and appendiceal orifice.   The colonoscope was slowly withdrawn and the mucosa examined for any abnormalities.  Cecal withdrawal time was greater than 6 minutes. The patient tolerated the procedure well. There were no complications.    Bowel Prep Quality: good.     Findings/Interventions:   Polyps - none    EBL: none    Recommendations: -Repeat colonoscopy in 3 years.   Resume normal medication(s).     Discharge Disposition:  Home  Guillermo Arrington MD  6/11/2025  10:15 AM

## 2025-06-11 NOTE — DISCHARGE INSTRUCTIONS
Repeat colonoscopy in 3 year(s).      Colonoscopy: What to Expect at Home  Your Recovery  After a colonoscopy, you'll stay at the clinic until you wake up. Then you can go home. But you'll need to arrange for a ride. Your doctor will tell you when you can eat and do your other usual activities.  Your doctor will talk to you about when you'll need your next colonoscopy. Your doctor can help you decide how often you need to be checked. This will depend on the results of your test and your risk for colorectal cancer.  After the test, you may be bloated or have gas pains. You may need to pass gas. If a biopsy was done or a polyp was removed, you may have streaks of blood in your stool (feces) for a few days. Problems such as heavy rectal bleeding may not occur until several weeks after the test. This isn't common. But it can happen after polyps are removed.  This care sheet gives you a general idea about how long it will take for you to recover. But each person recovers at a different pace. Follow the steps below to get better as quickly as possible.  How can you care for yourself at home?  Activity    Rest when you feel tired.     You can do your normal activities when it feels okay to do so.   Diet    Follow your doctor's directions for eating.     Unless your doctor has told you not to, drink plenty of fluids. This helps to replace the fluids that were lost during the colon prep.     Do not drink alcohol.   Medicines    Your doctor will tell you if and when you can restart your medicines. You will also be given instructions about taking any new medicines.     If you take aspirin or some other blood thinner, ask your doctor if and when to start taking it again. Make sure that you understand exactly what your doctor wants you to do.     If polyps were removed or a biopsy was done during the test, your doctor may tell you not to take aspirin or other anti-inflammatory medicines for a few days. These include ibuprofen

## 2025-06-11 NOTE — ANESTHESIA POSTPROCEDURE EVALUATION
Department of Anesthesiology  Postprocedure Note    Patient: Verito Morales  MRN: [id number]  YOB: 1965  Date of evaluation: 6/11/2025    Procedure Summary       Date: 06/11/25 Room / Location: [facility] ENDO 03 / [facility] ENDOSCOPY    Anesthesia Start: 0957 Anesthesia Stop: 1017    Procedure: COLONOSCOPY (Abdomen) Diagnosis:       Colon cancer screening      Family history of colon cancer      (Colon cancer screening [Z12.11])      (Family history of colon cancer [Z80.0])    Surgeons: Guillermo Arrington MD Responsible Provider: Yves Dickinson MD    Anesthesia Type: MAC ASA Status: 3            Anesthesia Type: MAC    Jay Phase I: Jay Score: 10    Jay Phase II: Jay Score: 10    Anesthesia Post Evaluation    Patient location during evaluation: bedside  Patient participation: complete - patient participated  Level of consciousness: awake  Airway patency: patent  Nausea & Vomiting: no nausea  Cardiovascular status: hemodynamically stable  Respiratory status: acceptable  Hydration status: euvolemic  Multimodal analgesia pain management approach  Pain management: adequate    No notable events documented.

## 2025-06-12 ENCOUNTER — OFFICE VISIT (OUTPATIENT)
Age: 60
End: 2025-06-12

## 2025-06-12 DIAGNOSIS — M17.11 PRIMARY OSTEOARTHRITIS OF RIGHT KNEE: Primary | ICD-10-CM

## 2025-06-27 PROBLEM — Z12.11 COLON CANCER SCREENING: Status: RESOLVED | Noted: 2025-05-28 | Resolved: 2025-06-27

## 2025-06-29 PROBLEM — E66.09 CLASS 2 OBESITY DUE TO EXCESS CALORIES WITH BODY MASS INDEX (BMI) OF 36.0 TO 36.9 IN ADULT: Status: ACTIVE | Noted: 2025-06-29

## 2025-06-29 PROBLEM — E66.812 CLASS 2 OBESITY DUE TO EXCESS CALORIES WITH BODY MASS INDEX (BMI) OF 36.0 TO 36.9 IN ADULT: Status: ACTIVE | Noted: 2025-06-29

## 2025-06-29 NOTE — PATIENT INSTRUCTIONS
Please make a follow up appointment to discuss the results of your sleep study or I will send you a \"my chart\" message with your results and treatment options.     The [facility] Lab is located in the [facility], adjacent to [facility]. The lab is on the second floor. The direct number to call for sleep study related questions is: [phone].    Please call our clinic back at [phone] or send a message on [facility] if you have any questions or concerns or if you are experiencing any of the following:     You have not received a follow up appointment within 30 days prior the recommended follow up time.    If you are not tolerating treatment plan and/or not able to obtain equipment or prescribed medication(s).  if you are experiencing any difficulties with the Durable Medical Equipment  (DME) Company you may be using or is assigned to you.  Two weeks have passed and you have not received an appointment for a scheduled procedure.  Two weeks have passed since you underwent a test and/or procedure and you have not received your results.  Your  Durable Medical Equipment (DME ) company is supposed to provide you with replacement filters, tubing and masks. You can either call your DME when you need new supplies or you can arrange for an automatic shipment schedule.    Your need to be seen by our office at lat minimum of every 12 months in order to renew the prescription for these supplies.   Please make note of who your DME company is and their phone number.   Please make sure that you clean your mask and hosing on a regular basis.  Your DME can provide you with additional information regarding proper care and cleaning of your device     Safety is strongly encouraged.  You should not drive if sleepy, tired, distracted and/or fatigued.      Chest Xrays and blood work do not require appointments.  They are considered \"Walk-In\" services and can be obtained at either [facility] or [facility]

## 2025-06-29 NOTE — PROGRESS NOTES
[facility]   Sleep Medicine     Office Progress Note - Initial Evaluation        [address]  [address]  [place] [address] [phone] Fax    Reason for visit/referral: Evaluation for \"Insomnia\"  Assessment:      1. Circadian rhythm sleep disorder, delayed sleep phase type  2. Inadequate sleep hygiene  3. Moderate episode of recurrent major depressive disorder (HCC)  -     External Referral To Psychiatry  4. Hypothyroidism, unspecified type  Overview:  TSH 1/27/2025 was 4.060  5. Class 2 obesity due to excess calories with body mass index (BMI) of 36.0 to 36.9 in adult, unspecified whether serious comorbidity present  6. Prediabetes  Overview:  A1c in 2023 was 5.7  7. Essential hypertension  Assessment & Plan:   Chronic, at goal (stable), continue current treatment plan, managed by PCP     Verito Morales is a 59 year old female with a history of hypertension, hyperlipidemia, neuropathy, vitamin D deficiency, depression, prediabetes, obesity and \"insomnia'.    Today she stated that she never can fall asleep and has been going on for \"years\".    Interestingly, she says that she is only getting 3 to 4 hours of sleep and it has been going on for years yet she has no daytime dysfunction or impairment at all.  I think she would say that she feels somewhat fatigued throughout the day but certainly is not sleepy and does not have an issue being active and she stated today \"I just go go go\".    We discussed cognitive behavioral therapy and I gave her a handout today to start reading about this.  I also gave her a 2-week sleep diary.    The first thing she needs to start working on is her delayed sleep phase disorder.  I discussed the fact that she needs to start taking 3 mg of melatonin several hours before her desired bedtime.  Additionally, she needs to get bright light exposure when she wakes up in the morning regardless of what time it is.    She also needs to start

## 2025-06-30 ENCOUNTER — OFFICE VISIT (OUTPATIENT)
Age: 60
End: 2025-06-30
Payer: MEDICARE

## 2025-06-30 VITALS
TEMPERATURE: 98.1 F | WEIGHT: 208 LBS | DIASTOLIC BLOOD PRESSURE: 80 MMHG | HEART RATE: 83 BPM | SYSTOLIC BLOOD PRESSURE: 132 MMHG | BODY MASS INDEX: 36.86 KG/M2 | HEIGHT: 63 IN | RESPIRATION RATE: 16 BRPM | OXYGEN SATURATION: 97 %

## 2025-06-30 DIAGNOSIS — E03.9 HYPOTHYROIDISM, UNSPECIFIED TYPE: ICD-10-CM

## 2025-06-30 DIAGNOSIS — F33.1 MODERATE EPISODE OF RECURRENT MAJOR DEPRESSIVE DISORDER (HCC): ICD-10-CM

## 2025-06-30 DIAGNOSIS — R73.03 PREDIABETES: ICD-10-CM

## 2025-06-30 DIAGNOSIS — I10 ESSENTIAL HYPERTENSION: ICD-10-CM

## 2025-06-30 DIAGNOSIS — E66.09 CLASS 2 OBESITY DUE TO EXCESS CALORIES WITH BODY MASS INDEX (BMI) OF 36.0 TO 36.9 IN ADULT, UNSPECIFIED WHETHER SERIOUS COMORBIDITY PRESENT: ICD-10-CM

## 2025-06-30 DIAGNOSIS — E66.812 CLASS 2 OBESITY DUE TO EXCESS CALORIES WITH BODY MASS INDEX (BMI) OF 36.0 TO 36.9 IN ADULT, UNSPECIFIED WHETHER SERIOUS COMORBIDITY PRESENT: ICD-10-CM

## 2025-06-30 DIAGNOSIS — Z72.821 INADEQUATE SLEEP HYGIENE: ICD-10-CM

## 2025-06-30 DIAGNOSIS — G47.21 CIRCADIAN RHYTHM SLEEP DISORDER, DELAYED SLEEP PHASE TYPE: Primary | ICD-10-CM

## 2025-06-30 PROCEDURE — 3079F DIAST BP 80-89 MM HG: CPT | Performed by: OTOLARYNGOLOGY

## 2025-06-30 PROCEDURE — 99204 OFFICE O/P NEW MOD 45 MIN: CPT | Performed by: OTOLARYNGOLOGY

## 2025-06-30 PROCEDURE — 3075F SYST BP GE 130 - 139MM HG: CPT | Performed by: OTOLARYNGOLOGY

## 2025-06-30 PROCEDURE — G8427 DOCREV CUR MEDS BY ELIG CLIN: HCPCS | Performed by: OTOLARYNGOLOGY

## 2025-06-30 PROCEDURE — 1036F TOBACCO NON-USER: CPT | Performed by: OTOLARYNGOLOGY

## 2025-06-30 PROCEDURE — G8417 CALC BMI ABV UP PARAM F/U: HCPCS | Performed by: OTOLARYNGOLOGY

## 2025-06-30 PROCEDURE — 3017F COLORECTAL CA SCREEN DOC REV: CPT | Performed by: OTOLARYNGOLOGY

## 2025-06-30 ASSESSMENT — SLEEP AND FATIGUE QUESTIONNAIRES
HOW LIKELY ARE YOU TO NOD OFF OR FALL ASLEEP WHILE SITTING AND TALKING TO SOMEONE: WOULD NEVER DOZE
HOW LIKELY ARE YOU TO NOD OFF OR FALL ASLEEP WHILE LYING DOWN TO REST IN THE AFTERNOON WHEN CIRCUMSTANCES PERMIT: WOULD NEVER DOZE
HOW LIKELY ARE YOU TO NOD OFF OR FALL ASLEEP WHILE SITTING AND READING: WOULD NEVER DOZE
HOW LIKELY ARE YOU TO NOD OFF OR FALL ASLEEP WHILE SITTING INACTIVE IN A PUBLIC PLACE: WOULD NEVER DOZE
HOW LIKELY ARE YOU TO NOD OFF OR FALL ASLEEP IN A CAR, WHILE STOPPED FOR A FEW MINUTES IN TRAFFIC: WOULD NEVER DOZE
HOW LIKELY ARE YOU TO NOD OFF OR FALL ASLEEP WHEN YOU ARE A PASSENGER IN A CAR FOR AN HOUR WITHOUT A BREAK: WOULD NEVER DOZE
ESS TOTAL SCORE: 0
HOW LIKELY ARE YOU TO NOD OFF OR FALL ASLEEP WHILE WATCHING TV: WOULD NEVER DOZE
HOW LIKELY ARE YOU TO NOD OFF OR FALL ASLEEP WHILE SITTING QUIETLY AFTER LUNCH WITHOUT ALCOHOL: WOULD NEVER DOZE

## 2025-06-30 ASSESSMENT — PATIENT HEALTH QUESTIONNAIRE - PHQ9
2. FEELING DOWN, DEPRESSED OR HOPELESS: SEVERAL DAYS
1. LITTLE INTEREST OR PLEASURE IN DOING THINGS: NOT AT ALL
SUM OF ALL RESPONSES TO PHQ QUESTIONS 1-9: 1

## 2025-06-30 NOTE — PROGRESS NOTES
Verito Morales presents today for   Chief Complaint   Patient presents with    Insomnia    Sleep Problem       Is someone accompanying this pt? no    Is the patient using any DME equipment during OV? no    -DME Company: N/A    Have you ever had a sleep study done before? no    Depression Screenin/30/2025     1:08 PM   PHQ-9    Little interest or pleasure in doing things 0   Feeling down, depressed, or hopeless 1   PHQ-2 Score 1   PHQ-9 Total Score 1        [place] Sleepiness Scale:      2025     1:09 PM   Sleep Medicine   Sitting and reading 0   Watching TV 0   Sitting, inactive in a public place (e.g. a theatre or a meeting) 0   As a passenger in a car for an hour without a break 0   Lying down to rest in the afternoon when circumstances permit 0   Sitting and talking to someone 0   Sitting quietly after a lunch without alcohol 0   In a car, while stopped for a few minutes in traffic 0   [place] Sleepiness Score 0   Neck (Inches) 13.5       Stop-Ban/30/2025     1:00 PM   STOP-BANG QUESTIONNAIRE   Are you a loud and/or regular snorer? 0   Do you often feel tired or groggy upon awakening or do you awaken with a headache? 1   Have you been observed to gasp or stop breathing during sleep? 0   Are you often tired or fatigued during wake time hours?  0   Do you fall asleep sitting, reading, watching TV or driving? 0   Do you often have problems with memory or concentration? 0   Do you have or are you being treated for high blood pressure? 1   Recent BMI (Calculated) 36.8   Is BMI greater than 35 kg/m2? 1=Yes   Age older than 50 years old? 1=Yes   Is your neck circumference greater than 17 inches (Male) or 16 inches (Female)? 0   Gender - Male 0=No   STOP-Bang Total Score 40.8         Coordination of Care:  1. Have you been to the ER, urgent care clinic since your last visit? Hospitalized since your last visit? no    2. Have you seen or consulted any other health care providers outside of the [facility]

## 2025-07-03 ENCOUNTER — CLINICAL DOCUMENTATION (OUTPATIENT)
Age: 60
End: 2025-07-03

## 2025-07-03 NOTE — PROGRESS NOTES
External referral to psychiatry sent to [facility] 7-2-2025.

## 2025-08-26 ENCOUNTER — TELEMEDICINE ON DEMAND (OUTPATIENT)
Age: 60
End: 2025-08-26
Payer: MEDICARE

## 2025-08-26 DIAGNOSIS — I10 ESSENTIAL (PRIMARY) HYPERTENSION: ICD-10-CM

## 2025-08-26 PROCEDURE — 99213 OFFICE O/P EST LOW 20 MIN: CPT | Performed by: NURSE PRACTITIONER

## 2025-08-26 RX ORDER — AMLODIPINE BESYLATE 5 MG/1
5 TABLET ORAL DAILY
Qty: 30 TABLET | Refills: 1 | Status: SHIPPED | OUTPATIENT
Start: 2025-08-26 | End: 2025-10-25

## 2025-08-26 ASSESSMENT — ENCOUNTER SYMPTOMS
BLURRED VISION: 0
SHORTNESS OF BREATH: 0
CHEST TIGHTNESS: 0

## (undated) DEVICE — LINER SUCT CANSTR 3000CC PLAS SFT PRE ASSEMB W/OUT TBNG W/

## (undated) DEVICE — SNARE POLYP M W27MMXL240CM OVL STIFF DISP CAPTIVATOR

## (undated) DEVICE — SOLUTION IRRIG 3000ML 0.9% SOD CHL FLX CONT 0797208] ICU MEDICAL INC]

## (undated) DEVICE — CATHETER SUCT TR FL TIP 14FR W/ O CTRL

## (undated) DEVICE — SUTURE MCRYL SZ 4-0 L18IN ABSRB UD L19MM PS-2 3/8 CIR PRIM Y496G

## (undated) DEVICE — SYRINGE MED 10ML LUERLOCK TIP W/O SFTY DISP

## (undated) DEVICE — POSITIONER ARTHSCP KNEE HLDR WHT W/O CVR

## (undated) DEVICE — SYRINGE 20ML LL S/C 50

## (undated) DEVICE — FORCEPS BX L240CM JAW DIA2.8MM L CAP W/ NDL MIC MESH TOOTH

## (undated) DEVICE — APPLICATOR BNDG 1MM ADH PREMIERPRO EXOFIN

## (undated) DEVICE — FLUFF AND POLYMER UNDERPAD,EXTRA HEAVY: Brand: WINGS

## (undated) DEVICE — INFLOW CASSETTE TUBING, DO NOT USE IF PACKAGE IS DAMAGED: Brand: CROSSFLOW

## (undated) DEVICE — ICE BG EGL STYL 9 IN BLU 12 CS

## (undated) DEVICE — GARMENT,MEDLINE,DVT,INT,CALF,FOAM,MED: Brand: MEDLINE

## (undated) DEVICE — GAUZE,SPONGE,4"X4",16PLY,STRL,LF,10/TRAY: Brand: MEDLINE

## (undated) DEVICE — AIRLIFE™ NASAL OXYGEN CANNULA CURVED, NONFLARED TIP WITH 14 FOOT (4.3 M) CRUSH-RESISTANT TUBING, OVER-THE-EAR STYLE: Brand: AIRLIFE™

## (undated) DEVICE — ZIMMER® STERILE DISPOSABLE TOURNIQUET CUFF WITH PROTECTIVE SLEEVE AND PLC, DUAL PORT, SINGLE BLADDER, 34 IN. (86 CM)

## (undated) DEVICE — FLEX ADVANTAGE 1500CC: Brand: FLEX ADVANTAGE

## (undated) DEVICE — CANNULA ORIG TL CLR W FOAM CUSHIONS AND 14FT SUPL TB 3 CHN

## (undated) DEVICE — CATHETER THOR 36FR DIA10.7MM POLYVI CHL TRCR TIP STR SFT

## (undated) DEVICE — SYRINGE MED 50ML LUERSLIP TIP

## (undated) DEVICE — SOLUTION IRRIG 1000ML H2O STRL BLT

## (undated) DEVICE — UNDERPAD INCONT W23XL36IN STD BLU POLYPR BK FLUF SFT

## (undated) DEVICE — SYRINGE MED 25GA 3ML L5/8IN SUBQ PLAS W/ DETACH NDL SFTY

## (undated) DEVICE — MEDI-VAC NON-CONDUCTIVE SUCTION TUBING: Brand: CARDINAL HEALTH

## (undated) DEVICE — 4-PORT MANIFOLD: Brand: NEPTUNE 2

## (undated) DEVICE — PREP SKN CHLRAPRP APL 26ML STR --

## (undated) DEVICE — INTENDED FOR TISSUE SEPARATION, AND OTHER PROCEDURES THAT REQUIRE A SHARP SURGICAL BLADE TO PUNCTURE OR CUT.: Brand: BARD-PARKER SAFETY BLADES SIZE 11, STERILE

## (undated) DEVICE — Device

## (undated) DEVICE — KNEE ARTHROSCOPY III-LF: Brand: MEDLINE INDUSTRIES, INC.

## (undated) DEVICE — CATH IV SAFE STR 22GX1IN BLU -- PROTECTIV PLUS

## (undated) DEVICE — CANNULA NSL AD TBNG L14FT STD PVC O2 CRV CONN NONFLARED NSL

## (undated) DEVICE — BLADE SHV 4.0MM TOMCAT --

## (undated) DEVICE — ENDOSCOPY PUMP TUBING/ CAP SET: Brand: ERBE

## (undated) DEVICE — GOWN PLASTIC FILM THMBHKS UNIV BLUE: Brand: CARDINAL HEALTH

## (undated) DEVICE — YANKAUER,SMOOTH HANDLE,HIGH CAPACITY: Brand: MEDLINE INDUSTRIES, INC.